# Patient Record
Sex: FEMALE | Race: WHITE | NOT HISPANIC OR LATINO | Employment: OTHER | URBAN - NONMETROPOLITAN AREA
[De-identification: names, ages, dates, MRNs, and addresses within clinical notes are randomized per-mention and may not be internally consistent; named-entity substitution may affect disease eponyms.]

---

## 2018-01-25 ENCOUNTER — APPOINTMENT (OUTPATIENT)
Dept: ULTRASOUND IMAGING | Facility: HOSPITAL | Age: 78
End: 2018-01-25

## 2018-01-25 ENCOUNTER — APPOINTMENT (OUTPATIENT)
Dept: GENERAL RADIOLOGY | Facility: HOSPITAL | Age: 78
End: 2018-01-25

## 2018-01-25 ENCOUNTER — HOSPITAL ENCOUNTER (INPATIENT)
Facility: HOSPITAL | Age: 78
LOS: 7 days | Discharge: NURSING, REHAB OR LONG-TERM CARE | End: 2018-02-01
Attending: EMERGENCY MEDICINE | Admitting: FAMILY MEDICINE

## 2018-01-25 DIAGNOSIS — I50.9 ACUTE CONGESTIVE HEART FAILURE, UNSPECIFIED CONGESTIVE HEART FAILURE TYPE: Primary | ICD-10-CM

## 2018-01-25 DIAGNOSIS — Z74.09 IMPAIRED FUNCTIONAL MOBILITY, BALANCE, GAIT, AND ENDURANCE: ICD-10-CM

## 2018-01-25 LAB
ALBUMIN SERPL-MCNC: 3 G/DL (ref 3.5–5)
ALBUMIN/GLOB SERPL: 0.9 G/DL (ref 1.1–2.5)
ALP SERPL-CCNC: 126 U/L (ref 24–120)
ALT SERPL W P-5'-P-CCNC: 35 U/L (ref 0–54)
ANION GAP SERPL CALCULATED.3IONS-SCNC: 12 MMOL/L (ref 4–13)
ANISOCYTOSIS BLD QL: NORMAL
AST SERPL-CCNC: 36 U/L (ref 7–45)
BACTERIA UR QL AUTO: ABNORMAL /HPF
BASOPHILS # BLD AUTO: 0.07 10*3/MM3 (ref 0–0.2)
BASOPHILS # BLD MANUAL: 0.08 10*3/MM3 (ref 0–0.2)
BASOPHILS NFR BLD AUTO: 0.8 % (ref 0–2)
BASOPHILS NFR BLD AUTO: 1 % (ref 0–2)
BILIRUB SERPL-MCNC: 0.3 MG/DL (ref 0.1–1)
BILIRUB UR QL STRIP: NEGATIVE
BUN BLD-MCNC: 28 MG/DL (ref 5–21)
BUN/CREAT SERPL: 19.3 (ref 7–25)
BURR CELLS BLD QL SMEAR: NORMAL
CALCIUM SPEC-SCNC: 8.8 MG/DL (ref 8.4–10.4)
CHLORIDE SERPL-SCNC: 102 MMOL/L (ref 98–110)
CK SERPL-CCNC: 36 U/L (ref 0–203)
CLARITY UR: ABNORMAL
CO2 SERPL-SCNC: 25 MMOL/L (ref 24–31)
COLOR UR: ABNORMAL
CREAT BLD-MCNC: 1.45 MG/DL (ref 0.5–1.4)
D DIMER PPP FEU-MCNC: 2.19 MG/L (FEU) (ref 0–0.5)
DEPRECATED RDW RBC AUTO: 42.5 FL (ref 40–54)
DEPRECATED RDW RBC AUTO: 44.5 FL (ref 40–54)
EOSINOPHIL # BLD AUTO: 0.09 10*3/MM3 (ref 0–0.7)
EOSINOPHIL NFR BLD AUTO: 1.1 % (ref 0–4)
ERYTHROCYTE [DISTWIDTH] IN BLOOD BY AUTOMATED COUNT: 16.5 % (ref 12–15)
ERYTHROCYTE [DISTWIDTH] IN BLOOD BY AUTOMATED COUNT: 16.6 % (ref 12–15)
GFR SERPL CREATININE-BSD FRML MDRD: 35 ML/MIN/1.73
GLOBULIN UR ELPH-MCNC: 3.4 GM/DL
GLUCOSE BLD-MCNC: 93 MG/DL (ref 70–100)
GLUCOSE UR STRIP-MCNC: NEGATIVE MG/DL
GRAN CASTS URNS QL MICRO: ABNORMAL /LPF
HCT VFR BLD AUTO: 29 % (ref 37–47)
HCT VFR BLD AUTO: 31.2 % (ref 37–47)
HGB BLD-MCNC: 8.7 G/DL (ref 12–16)
HGB BLD-MCNC: 8.8 G/DL (ref 12–16)
HGB UR QL STRIP.AUTO: NEGATIVE
HYALINE CASTS UR QL AUTO: ABNORMAL /LPF
HYPOCHROMIA BLD QL: NORMAL
HYPOCHROMIA BLD QL: NORMAL
IMM GRANULOCYTES # BLD: 0.03 10*3/MM3 (ref 0–0.03)
IMM GRANULOCYTES NFR BLD: 0.4 % (ref 0–5)
KETONES UR QL STRIP: NEGATIVE
LEUKOCYTE ESTERASE UR QL STRIP.AUTO: ABNORMAL
LYMPHOCYTES # BLD AUTO: 2.28 10*3/MM3 (ref 0.72–4.86)
LYMPHOCYTES # BLD MANUAL: 1.36 10*3/MM3 (ref 0.72–4.86)
LYMPHOCYTES NFR BLD AUTO: 26.7 % (ref 15–45)
LYMPHOCYTES NFR BLD MANUAL: 18 % (ref 15–45)
LYMPHOCYTES NFR BLD MANUAL: 9 % (ref 4–12)
MACROCYTES BLD QL SMEAR: NORMAL
MAGNESIUM SERPL-MCNC: 2 MG/DL (ref 1.4–2.2)
MCH RBC QN AUTO: 21 PG (ref 28–32)
MCH RBC QN AUTO: 21.8 PG (ref 28–32)
MCHC RBC AUTO-ENTMCNC: 28.2 G/DL (ref 33–36)
MCHC RBC AUTO-ENTMCNC: 30 G/DL (ref 33–36)
MCV RBC AUTO: 72.7 FL (ref 82–98)
MCV RBC AUTO: 74.5 FL (ref 82–98)
MICROCYTES BLD QL: NORMAL
MICROCYTES BLD QL: NORMAL
MONOCYTES # BLD AUTO: 0.68 10*3/MM3 (ref 0.19–1.3)
MONOCYTES # BLD AUTO: 0.7 10*3/MM3 (ref 0.19–1.3)
MONOCYTES NFR BLD AUTO: 8.2 % (ref 4–12)
NEUTROPHILS # BLD AUTO: 5.38 10*3/MM3 (ref 1.87–8.4)
NEUTROPHILS # BLD AUTO: 5.43 10*3/MM3 (ref 1.87–8.4)
NEUTROPHILS NFR BLD AUTO: 62.8 % (ref 39–78)
NEUTROPHILS NFR BLD MANUAL: 72 % (ref 39–78)
NITRITE UR QL STRIP: NEGATIVE
NRBC BLD MANUAL-RTO: 0 /100 WBC (ref 0–0)
NT-PROBNP SERPL-MCNC: 4200 PG/ML (ref 0–1800)
PH UR STRIP.AUTO: <=5 [PH] (ref 5–8)
PHOSPHATE SERPL-MCNC: 4.3 MG/DL (ref 2.5–4.5)
PLAT MORPH BLD: NORMAL
PLATELET # BLD AUTO: 371 10*3/MM3 (ref 130–400)
PLATELET # BLD AUTO: 377 10*3/MM3 (ref 130–400)
PMV BLD AUTO: 10.6 FL (ref 6–12)
PMV BLD AUTO: 11.2 FL (ref 6–12)
POIKILOCYTOSIS BLD QL SMEAR: NORMAL
POIKILOCYTOSIS BLD QL SMEAR: NORMAL
POLYCHROMASIA BLD QL SMEAR: NORMAL
POTASSIUM BLD-SCNC: 4.4 MMOL/L (ref 3.5–5.3)
PROT SERPL-MCNC: 6.4 G/DL (ref 6.3–8.7)
PROT UR QL STRIP: ABNORMAL
RBC # BLD AUTO: 3.99 10*6/MM3 (ref 4.2–5.4)
RBC # BLD AUTO: 4.19 10*6/MM3 (ref 4.2–5.4)
RBC # UR: ABNORMAL /HPF
REF LAB TEST METHOD: ABNORMAL
SCAN SLIDE: NORMAL
SMALL PLATELETS BLD QL SMEAR: ADEQUATE
SODIUM BLD-SCNC: 139 MMOL/L (ref 135–145)
SP GR UR STRIP: 1.03 (ref 1–1.03)
SQUAMOUS #/AREA URNS HPF: ABNORMAL /HPF
TARGETS BLD QL SMEAR: NORMAL
TROPONIN I SERPL-MCNC: 0.05 NG/ML (ref 0–0.03)
TROPONIN I SERPL-MCNC: 0.06 NG/ML (ref 0–0.03)
UROBILINOGEN UR QL STRIP: ABNORMAL
WBC MORPH BLD: NORMAL
WBC MORPH BLD: NORMAL
WBC NRBC COR # BLD: 7.54 10*3/MM3 (ref 4.8–10.8)
WBC NRBC COR # BLD: 8.55 10*3/MM3 (ref 4.8–10.8)
WBC UR QL AUTO: ABNORMAL /HPF
YEAST URNS QL MICRO: ABNORMAL /HPF

## 2018-01-25 PROCEDURE — 93970 EXTREMITY STUDY: CPT | Performed by: SURGERY

## 2018-01-25 PROCEDURE — 81001 URINALYSIS AUTO W/SCOPE: CPT | Performed by: EMERGENCY MEDICINE

## 2018-01-25 PROCEDURE — 85025 COMPLETE CBC W/AUTO DIFF WBC: CPT | Performed by: EMERGENCY MEDICINE

## 2018-01-25 PROCEDURE — 83735 ASSAY OF MAGNESIUM: CPT | Performed by: NURSE PRACTITIONER

## 2018-01-25 PROCEDURE — 93005 ELECTROCARDIOGRAM TRACING: CPT | Performed by: EMERGENCY MEDICINE

## 2018-01-25 PROCEDURE — 85379 FIBRIN DEGRADATION QUANT: CPT | Performed by: EMERGENCY MEDICINE

## 2018-01-25 PROCEDURE — 84484 ASSAY OF TROPONIN QUANT: CPT | Performed by: EMERGENCY MEDICINE

## 2018-01-25 PROCEDURE — 36415 COLL VENOUS BLD VENIPUNCTURE: CPT | Performed by: NURSE PRACTITIONER

## 2018-01-25 PROCEDURE — 25010000002 ENOXAPARIN PER 10 MG: Performed by: NURSE PRACTITIONER

## 2018-01-25 PROCEDURE — 85025 COMPLETE CBC W/AUTO DIFF WBC: CPT | Performed by: NURSE PRACTITIONER

## 2018-01-25 PROCEDURE — 85007 BL SMEAR W/DIFF WBC COUNT: CPT | Performed by: EMERGENCY MEDICINE

## 2018-01-25 PROCEDURE — 25010000002 FUROSEMIDE PER 20 MG: Performed by: EMERGENCY MEDICINE

## 2018-01-25 PROCEDURE — 87086 URINE CULTURE/COLONY COUNT: CPT | Performed by: EMERGENCY MEDICINE

## 2018-01-25 PROCEDURE — 85007 BL SMEAR W/DIFF WBC COUNT: CPT | Performed by: NURSE PRACTITIONER

## 2018-01-25 PROCEDURE — 93010 ELECTROCARDIOGRAM REPORT: CPT | Performed by: INTERNAL MEDICINE

## 2018-01-25 PROCEDURE — 71045 X-RAY EXAM CHEST 1 VIEW: CPT

## 2018-01-25 PROCEDURE — 83880 ASSAY OF NATRIURETIC PEPTIDE: CPT | Performed by: EMERGENCY MEDICINE

## 2018-01-25 PROCEDURE — 82550 ASSAY OF CK (CPK): CPT | Performed by: EMERGENCY MEDICINE

## 2018-01-25 PROCEDURE — 80053 COMPREHEN METABOLIC PANEL: CPT | Performed by: EMERGENCY MEDICINE

## 2018-01-25 PROCEDURE — 25010000002 CEFTRIAXONE PER 250 MG: Performed by: NURSE PRACTITIONER

## 2018-01-25 PROCEDURE — 93970 EXTREMITY STUDY: CPT

## 2018-01-25 PROCEDURE — 84100 ASSAY OF PHOSPHORUS: CPT | Performed by: NURSE PRACTITIONER

## 2018-01-25 PROCEDURE — 84484 ASSAY OF TROPONIN QUANT: CPT | Performed by: NURSE PRACTITIONER

## 2018-01-25 PROCEDURE — 99284 EMERGENCY DEPT VISIT MOD MDM: CPT

## 2018-01-25 RX ORDER — CARVEDILOL 3.12 MG/1
3.12 TABLET ORAL EVERY 12 HOURS SCHEDULED
Status: DISCONTINUED | OUTPATIENT
Start: 2018-01-25 | End: 2018-02-01 | Stop reason: HOSPADM

## 2018-01-25 RX ORDER — ONDANSETRON 2 MG/ML
4 INJECTION INTRAMUSCULAR; INTRAVENOUS EVERY 6 HOURS PRN
Status: DISCONTINUED | OUTPATIENT
Start: 2018-01-25 | End: 2018-02-01 | Stop reason: HOSPADM

## 2018-01-25 RX ORDER — LOPERAMIDE HYDROCHLORIDE 2 MG/1
2 CAPSULE ORAL 4 TIMES DAILY PRN
COMMUNITY
End: 2018-02-01 | Stop reason: HOSPADM

## 2018-01-25 RX ORDER — ACETAMINOPHEN 325 MG/1
650 TABLET ORAL EVERY 6 HOURS PRN
COMMUNITY

## 2018-01-25 RX ORDER — ONDANSETRON 4 MG/1
4 TABLET, ORALLY DISINTEGRATING ORAL EVERY 6 HOURS PRN
Status: DISCONTINUED | OUTPATIENT
Start: 2018-01-25 | End: 2018-02-01 | Stop reason: HOSPADM

## 2018-01-25 RX ORDER — SODIUM CHLORIDE 0.9 % (FLUSH) 0.9 %
1-10 SYRINGE (ML) INJECTION AS NEEDED
Status: DISCONTINUED | OUTPATIENT
Start: 2018-01-25 | End: 2018-02-01 | Stop reason: HOSPADM

## 2018-01-25 RX ORDER — ACETAMINOPHEN 325 MG/1
650 TABLET ORAL EVERY 4 HOURS PRN
Status: DISCONTINUED | OUTPATIENT
Start: 2018-01-25 | End: 2018-02-01 | Stop reason: HOSPADM

## 2018-01-25 RX ORDER — ONDANSETRON 4 MG/1
4 TABLET, FILM COATED ORAL EVERY 6 HOURS PRN
Status: DISCONTINUED | OUTPATIENT
Start: 2018-01-25 | End: 2018-02-01 | Stop reason: HOSPADM

## 2018-01-25 RX ORDER — FUROSEMIDE 10 MG/ML
40 INJECTION INTRAMUSCULAR; INTRAVENOUS ONCE
Status: COMPLETED | OUTPATIENT
Start: 2018-01-25 | End: 2018-01-25

## 2018-01-25 RX ORDER — SODIUM CHLORIDE 0.9 % (FLUSH) 0.9 %
10 SYRINGE (ML) INJECTION AS NEEDED
Status: DISCONTINUED | OUTPATIENT
Start: 2018-01-25 | End: 2018-02-01 | Stop reason: HOSPADM

## 2018-01-25 RX ADMIN — CARVEDILOL 3.12 MG: 3.12 TABLET, FILM COATED ORAL at 21:09

## 2018-01-25 RX ADMIN — ACETAMINOPHEN 650 MG: 325 TABLET ORAL at 18:54

## 2018-01-25 RX ADMIN — ACETAMINOPHEN 650 MG: 325 TABLET ORAL at 22:46

## 2018-01-25 RX ADMIN — CEFTRIAXONE SODIUM 1 G: 1 INJECTION, POWDER, FOR SOLUTION INTRAMUSCULAR; INTRAVENOUS at 18:50

## 2018-01-25 RX ADMIN — FUROSEMIDE 40 MG: 10 INJECTION, SOLUTION INTRAMUSCULAR; INTRAVENOUS at 16:23

## 2018-01-25 RX ADMIN — ACETAMINOPHEN 650 MG: 325 TABLET ORAL at 16:22

## 2018-01-25 RX ADMIN — ENOXAPARIN SODIUM 70 MG: 80 INJECTION SUBCUTANEOUS at 18:51

## 2018-01-26 ENCOUNTER — APPOINTMENT (OUTPATIENT)
Dept: CARDIOLOGY | Facility: HOSPITAL | Age: 78
End: 2018-01-26

## 2018-01-26 LAB
ADV 40+41 DNA STL QL NAA+NON-PROBE: NOT DETECTED
ALBUMIN SERPL-MCNC: 2.8 G/DL (ref 3.5–5)
ALBUMIN/GLOB SERPL: 0.9 G/DL (ref 1.1–2.5)
ALP SERPL-CCNC: 115 U/L (ref 24–120)
ALT SERPL W P-5'-P-CCNC: 31 U/L (ref 0–54)
ANION GAP SERPL CALCULATED.3IONS-SCNC: 11 MMOL/L (ref 4–13)
ARTICHOKE IGE QN: 71 MG/DL (ref 0–99)
AST SERPL-CCNC: 27 U/L (ref 7–45)
ASTRO TYP 1-8 RNA STL QL NAA+NON-PROBE: NOT DETECTED
BASOPHILS # BLD AUTO: 0.03 10*3/MM3 (ref 0–0.2)
BASOPHILS NFR BLD AUTO: 0.4 % (ref 0–2)
BH CV ECHO MEAS - AO MAX PG (FULL): 6 MMHG
BH CV ECHO MEAS - AO MAX PG: 8.8 MMHG
BH CV ECHO MEAS - AO MEAN PG (FULL): 4 MMHG
BH CV ECHO MEAS - AO MEAN PG: 5 MMHG
BH CV ECHO MEAS - AO ROOT AREA (BSA CORRECTED): 1.5
BH CV ECHO MEAS - AO ROOT AREA: 6.6 CM^2
BH CV ECHO MEAS - AO ROOT DIAM: 2.9 CM
BH CV ECHO MEAS - AO V2 MAX: 148 CM/SEC
BH CV ECHO MEAS - AO V2 MEAN: 102 CM/SEC
BH CV ECHO MEAS - AO V2 VTI: 31.8 CM
BH CV ECHO MEAS - AVA(I,A): 2.5 CM^2
BH CV ECHO MEAS - AVA(I,D): 2.5 CM^2
BH CV ECHO MEAS - AVA(V,A): 2.5 CM^2
BH CV ECHO MEAS - AVA(V,D): 2.5 CM^2
BH CV ECHO MEAS - BSA(HAYCOCK): 1.9 M^2
BH CV ECHO MEAS - BSA: 1.9 M^2
BH CV ECHO MEAS - BZI_BMI: 25.4 KILOGRAMS/M^2
BH CV ECHO MEAS - BZI_METRIC_HEIGHT: 172.7 CM
BH CV ECHO MEAS - BZI_METRIC_WEIGHT: 75.8 KG
BH CV ECHO MEAS - CONTRAST EF 4CH: 41.8 ML/M^2
BH CV ECHO MEAS - EDV(CUBED): 331.4 ML
BH CV ECHO MEAS - EDV(MOD-SP4): 169 ML
BH CV ECHO MEAS - EDV(TEICH): 248.9 ML
BH CV ECHO MEAS - EF(CUBED): 49.2 %
BH CV ECHO MEAS - EF(TEICH): 40.3 %
BH CV ECHO MEAS - ESV(CUBED): 168.2 ML
BH CV ECHO MEAS - ESV(MOD-SP4): 98.4 ML
BH CV ECHO MEAS - ESV(TEICH): 148.7 ML
BH CV ECHO MEAS - FS: 20.2 %
BH CV ECHO MEAS - IVS/LVPW: 1
BH CV ECHO MEAS - IVSD: 1.1 CM
BH CV ECHO MEAS - LA DIMENSION: 4.6 CM
BH CV ECHO MEAS - LA/AO: 1.6
BH CV ECHO MEAS - LAT PEAK E' VEL: 8 CM/SEC
BH CV ECHO MEAS - LV DIASTOLIC VOL/BSA (35-75): 89.3 ML/M^2
BH CV ECHO MEAS - LV MASS(C)D: 368.5 GRAMS
BH CV ECHO MEAS - LV MASS(C)DI: 194.7 GRAMS/M^2
BH CV ECHO MEAS - LV MAX PG: 2.8 MMHG
BH CV ECHO MEAS - LV MEAN PG: 1 MMHG
BH CV ECHO MEAS - LV SYSTOLIC VOL/BSA (12-30): 52 ML/M^2
BH CV ECHO MEAS - LV V1 MAX: 83.3 CM/SEC
BH CV ECHO MEAS - LV V1 MEAN: 50.5 CM/SEC
BH CV ECHO MEAS - LV V1 VTI: 17.7 CM
BH CV ECHO MEAS - LVIDD: 6.9 CM
BH CV ECHO MEAS - LVIDS: 5.5 CM
BH CV ECHO MEAS - LVLD AP4: 8.5 CM
BH CV ECHO MEAS - LVLS AP4: 7.9 CM
BH CV ECHO MEAS - LVOT AREA (M): 4.5 CM^2
BH CV ECHO MEAS - LVOT AREA: 4.5 CM^2
BH CV ECHO MEAS - LVOT DIAM: 2.4 CM
BH CV ECHO MEAS - LVPWD: 1.1 CM
BH CV ECHO MEAS - MED PEAK E' VEL: 3.02 CM/SEC
BH CV ECHO MEAS - MV A MAX VEL: 38.8 CM/SEC
BH CV ECHO MEAS - MV DEC TIME: 0.16 SEC
BH CV ECHO MEAS - MV E MAX VEL: 102 CM/SEC
BH CV ECHO MEAS - MV E/A: 2.6
BH CV ECHO MEAS - RAP SYSTOLE: 5 MMHG
BH CV ECHO MEAS - RVDD: 3.7 CM
BH CV ECHO MEAS - RVSP: 42 MMHG
BH CV ECHO MEAS - SI(AO): 111 ML/M^2
BH CV ECHO MEAS - SI(CUBED): 86.2 ML/M^2
BH CV ECHO MEAS - SI(LVOT): 42.3 ML/M^2
BH CV ECHO MEAS - SI(MOD-SP4): 37.3 ML/M^2
BH CV ECHO MEAS - SI(TEICH): 52.9 ML/M^2
BH CV ECHO MEAS - SV(AO): 210 ML
BH CV ECHO MEAS - SV(CUBED): 163.2 ML
BH CV ECHO MEAS - SV(LVOT): 80.1 ML
BH CV ECHO MEAS - SV(MOD-SP4): 70.6 ML
BH CV ECHO MEAS - SV(TEICH): 100.2 ML
BH CV ECHO MEAS - TR MAX VEL: 304 CM/SEC
BILIRUB SERPL-MCNC: 0.3 MG/DL (ref 0.1–1)
BUN BLD-MCNC: 27 MG/DL (ref 5–21)
BUN/CREAT SERPL: 18.4 (ref 7–25)
C CAYETANENSIS DNA STL QL NAA+NON-PROBE: NOT DETECTED
C DIFF TOX GENS STL QL NAA+PROBE: NOT DETECTED
CALCIUM SPEC-SCNC: 8.7 MG/DL (ref 8.4–10.4)
CAMPY SP DNA.DIARRHEA STL QL NAA+PROBE: NOT DETECTED
CHLORIDE SERPL-SCNC: 103 MMOL/L (ref 98–110)
CHOLEST SERPL-MCNC: 131 MG/DL (ref 130–200)
CO2 SERPL-SCNC: 24 MMOL/L (ref 24–31)
CREAT BLD-MCNC: 1.47 MG/DL (ref 0.5–1.4)
CRYPTOSP STL CULT: NOT DETECTED
DEPRECATED RDW RBC AUTO: 43.5 FL (ref 40–54)
E COLI DNA SPEC QL NAA+PROBE: NOT DETECTED
E HISTOLYT AG STL-ACNC: NOT DETECTED
E/E' RATIO: 33.8
EAEC PAA PLAS AGGR+AATA ST NAA+NON-PRB: NOT DETECTED
EC STX1+STX2 GENES STL QL NAA+NON-PROBE: NOT DETECTED
EOSINOPHIL # BLD AUTO: 0.06 10*3/MM3 (ref 0–0.7)
EOSINOPHIL NFR BLD AUTO: 0.9 % (ref 0–4)
EPEC EAE GENE STL QL NAA+NON-PROBE: NOT DETECTED
ERYTHROCYTE [DISTWIDTH] IN BLOOD BY AUTOMATED COUNT: 16.5 % (ref 12–15)
ETEC LTA+ST1A+ST1B TOX ST NAA+NON-PROBE: NOT DETECTED
FERRITIN SERPL-MCNC: 13.9 NG/ML (ref 11.1–264)
FOLATE SERPL-MCNC: 8.99 NG/ML
G LAMBLIA DNA SPEC QL NAA+PROBE: NOT DETECTED
GFR SERPL CREATININE-BSD FRML MDRD: 35 ML/MIN/1.73
GLOBULIN UR ELPH-MCNC: 3.1 GM/DL
GLUCOSE BLD-MCNC: 84 MG/DL (ref 70–100)
HBA1C MFR BLD: 6.7 %
HCT VFR BLD AUTO: 27.6 % (ref 37–47)
HDLC SERPL-MCNC: 43 MG/DL
HGB BLD-MCNC: 8.1 G/DL (ref 12–16)
IMM GRANULOCYTES # BLD: 0.02 10*3/MM3 (ref 0–0.03)
IMM GRANULOCYTES NFR BLD: 0.3 % (ref 0–5)
IRON 24H UR-MRATE: 25 MCG/DL (ref 42–180)
IRON SATN MFR SERPL: 6 % (ref 20–45)
LDLC/HDLC SERPL: 1.33 {RATIO}
LEFT ATRIUM VOLUME INDEX: 45.3 ML/M2
LEFT ATRIUM VOLUME: 85.6 CM3
LYMPHOCYTES # BLD AUTO: 1.44 10*3/MM3 (ref 0.72–4.86)
LYMPHOCYTES NFR BLD AUTO: 20.4 % (ref 15–45)
MAXIMAL PREDICTED HEART RATE: 144 BPM
MCH RBC QN AUTO: 21.5 PG (ref 28–32)
MCHC RBC AUTO-ENTMCNC: 29.3 G/DL (ref 33–36)
MCV RBC AUTO: 73.4 FL (ref 82–98)
MONOCYTES # BLD AUTO: 0.54 10*3/MM3 (ref 0.19–1.3)
MONOCYTES NFR BLD AUTO: 7.7 % (ref 4–12)
NEUTROPHILS # BLD AUTO: 4.96 10*3/MM3 (ref 1.87–8.4)
NEUTROPHILS NFR BLD AUTO: 70.3 % (ref 39–78)
NOROVIRUS GI+II RNA STL QL NAA+NON-PROBE: NOT DETECTED
NRBC BLD MANUAL-RTO: 0 /100 WBC (ref 0–0)
P SHIGELLOIDES DNA STL QL NAA+NON-PROBE: NOT DETECTED
PLATELET # BLD AUTO: 339 10*3/MM3 (ref 130–400)
PMV BLD AUTO: 10.7 FL (ref 6–12)
POTASSIUM BLD-SCNC: 4 MMOL/L (ref 3.5–5.3)
PROT SERPL-MCNC: 5.9 G/DL (ref 6.3–8.7)
RBC # BLD AUTO: 3.76 10*6/MM3 (ref 4.2–5.4)
RV RNA STL NAA+PROBE: NOT DETECTED
SALMONELLA DNA SPEC QL NAA+PROBE: NOT DETECTED
SAPO I+II+IV+V RNA STL QL NAA+NON-PROBE: NOT DETECTED
SHIGELLA SP+EIEC IPAH ST NAA+NON-PROBE: NOT DETECTED
SODIUM BLD-SCNC: 138 MMOL/L (ref 135–145)
STRESS TARGET HR: 122 BPM
TIBC SERPL-MCNC: 399 MCG/DL (ref 225–420)
TRIGL SERPL-MCNC: 154 MG/DL (ref 0–149)
TROPONIN I SERPL-MCNC: 0.06 NG/ML (ref 0–0.03)
TSH SERPL DL<=0.05 MIU/L-ACNC: 0.84 MIU/ML (ref 0.47–4.68)
V CHOLERAE DNA SPEC QL NAA+PROBE: NOT DETECTED
VIBRIO DNA SPEC NAA+PROBE: NOT DETECTED
VIT B12 BLD-MCNC: 456 PG/ML (ref 239–931)
WBC NRBC COR # BLD: 7.05 10*3/MM3 (ref 4.8–10.8)
YERSINIA STL CULT: NOT DETECTED

## 2018-01-26 PROCEDURE — 25010000002 PERFLUTREN 6.52 MG/ML SUSPENSION: Performed by: INTERNAL MEDICINE

## 2018-01-26 PROCEDURE — 85025 COMPLETE CBC W/AUTO DIFF WBC: CPT | Performed by: NURSE PRACTITIONER

## 2018-01-26 PROCEDURE — 25010000002 CEFTRIAXONE PER 250 MG: Performed by: NURSE PRACTITIONER

## 2018-01-26 PROCEDURE — 25010000002 IRON SUCROSE PER 1 MG: Performed by: INTERNAL MEDICINE

## 2018-01-26 PROCEDURE — 82728 ASSAY OF FERRITIN: CPT | Performed by: NURSE PRACTITIONER

## 2018-01-26 PROCEDURE — 82746 ASSAY OF FOLIC ACID SERUM: CPT | Performed by: NURSE PRACTITIONER

## 2018-01-26 PROCEDURE — 83550 IRON BINDING TEST: CPT | Performed by: NURSE PRACTITIONER

## 2018-01-26 PROCEDURE — 84443 ASSAY THYROID STIM HORMONE: CPT | Performed by: NURSE PRACTITIONER

## 2018-01-26 PROCEDURE — 87999 UNLISTED MICROBIOLOGY PX: CPT | Performed by: NURSE PRACTITIONER

## 2018-01-26 PROCEDURE — 25010000002 FUROSEMIDE PER 20 MG: Performed by: INTERNAL MEDICINE

## 2018-01-26 PROCEDURE — 80061 LIPID PANEL: CPT | Performed by: NURSE PRACTITIONER

## 2018-01-26 PROCEDURE — 83540 ASSAY OF IRON: CPT | Performed by: NURSE PRACTITIONER

## 2018-01-26 PROCEDURE — 83036 HEMOGLOBIN GLYCOSYLATED A1C: CPT | Performed by: NURSE PRACTITIONER

## 2018-01-26 PROCEDURE — 93306 TTE W/DOPPLER COMPLETE: CPT

## 2018-01-26 PROCEDURE — 25010000002 ENOXAPARIN PER 10 MG: Performed by: NURSE PRACTITIONER

## 2018-01-26 PROCEDURE — 93306 TTE W/DOPPLER COMPLETE: CPT | Performed by: INTERNAL MEDICINE

## 2018-01-26 PROCEDURE — 80053 COMPREHEN METABOLIC PANEL: CPT | Performed by: NURSE PRACTITIONER

## 2018-01-26 PROCEDURE — 82607 VITAMIN B-12: CPT | Performed by: NURSE PRACTITIONER

## 2018-01-26 RX ORDER — NYSTATIN 100000 [USP'U]/G
POWDER TOPICAL EVERY 8 HOURS SCHEDULED
Status: DISCONTINUED | OUTPATIENT
Start: 2018-01-26 | End: 2018-02-01 | Stop reason: HOSPADM

## 2018-01-26 RX ORDER — FUROSEMIDE 10 MG/ML
40 INJECTION INTRAMUSCULAR; INTRAVENOUS ONCE
Status: COMPLETED | OUTPATIENT
Start: 2018-01-26 | End: 2018-01-26

## 2018-01-26 RX ADMIN — CEFTRIAXONE SODIUM 1 G: 1 INJECTION, POWDER, FOR SOLUTION INTRAMUSCULAR; INTRAVENOUS at 17:39

## 2018-01-26 RX ADMIN — FUROSEMIDE 40 MG: 10 INJECTION, SOLUTION INTRAMUSCULAR; INTRAVENOUS at 17:34

## 2018-01-26 RX ADMIN — IRON SUCROSE 200 MG: 20 INJECTION, SOLUTION INTRAVENOUS at 17:45

## 2018-01-26 RX ADMIN — PERFLUTREN 8.48 MG: 6.52 INJECTION, SUSPENSION INTRAVENOUS at 15:47

## 2018-01-26 RX ADMIN — ENOXAPARIN SODIUM 70 MG: 80 INJECTION SUBCUTANEOUS at 17:36

## 2018-01-26 RX ADMIN — ACETAMINOPHEN 650 MG: 325 TABLET ORAL at 20:13

## 2018-01-26 RX ADMIN — ACETAMINOPHEN 650 MG: 325 TABLET ORAL at 15:03

## 2018-01-26 RX ADMIN — ENOXAPARIN SODIUM 70 MG: 80 INJECTION SUBCUTANEOUS at 06:23

## 2018-01-26 RX ADMIN — ACETAMINOPHEN 650 MG: 325 TABLET ORAL at 03:04

## 2018-01-26 RX ADMIN — NYSTATIN: 100000 POWDER TOPICAL at 20:17

## 2018-01-26 RX ADMIN — CARVEDILOL 3.12 MG: 3.12 TABLET, FILM COATED ORAL at 20:14

## 2018-01-26 RX ADMIN — SILVER SULFADIAZINE: 10 CREAM TOPICAL at 20:16

## 2018-01-26 RX ADMIN — SILVER SULFADIAZINE: 10 CREAM TOPICAL at 10:42

## 2018-01-26 RX ADMIN — CARVEDILOL 3.12 MG: 3.12 TABLET, FILM COATED ORAL at 08:49

## 2018-01-27 LAB
ANION GAP SERPL CALCULATED.3IONS-SCNC: 11 MMOL/L (ref 4–13)
BACTERIA SPEC AEROBE CULT: ABNORMAL
BACTERIA SPEC AEROBE CULT: ABNORMAL
BUN BLD-MCNC: 31 MG/DL (ref 5–21)
BUN/CREAT SERPL: 20.1 (ref 7–25)
CALCIUM SPEC-SCNC: 8.5 MG/DL (ref 8.4–10.4)
CHLORIDE SERPL-SCNC: 103 MMOL/L (ref 98–110)
CO2 SERPL-SCNC: 27 MMOL/L (ref 24–31)
CREAT BLD-MCNC: 1.54 MG/DL (ref 0.5–1.4)
DEPRECATED RDW RBC AUTO: 44.2 FL (ref 40–54)
ERYTHROCYTE [DISTWIDTH] IN BLOOD BY AUTOMATED COUNT: 16.8 % (ref 12–15)
GFR SERPL CREATININE-BSD FRML MDRD: 33 ML/MIN/1.73
GLUCOSE BLD-MCNC: 89 MG/DL (ref 70–100)
HCT VFR BLD AUTO: 31.4 % (ref 37–47)
HGB BLD-MCNC: 9.1 G/DL (ref 12–16)
MCH RBC QN AUTO: 21.5 PG (ref 28–32)
MCHC RBC AUTO-ENTMCNC: 29 G/DL (ref 33–36)
MCV RBC AUTO: 74.2 FL (ref 82–98)
PLATELET # BLD AUTO: 358 10*3/MM3 (ref 130–400)
PMV BLD AUTO: 10.7 FL (ref 6–12)
POTASSIUM BLD-SCNC: 3.9 MMOL/L (ref 3.5–5.3)
RBC # BLD AUTO: 4.23 10*6/MM3 (ref 4.2–5.4)
SODIUM BLD-SCNC: 141 MMOL/L (ref 135–145)
TROPONIN I SERPL-MCNC: 0.05 NG/ML (ref 0–0.03)
WBC NRBC COR # BLD: 8.65 10*3/MM3 (ref 4.8–10.8)

## 2018-01-27 PROCEDURE — 25010000002 CEFTRIAXONE PER 250 MG: Performed by: NURSE PRACTITIONER

## 2018-01-27 PROCEDURE — 99222 1ST HOSP IP/OBS MODERATE 55: CPT | Performed by: INTERNAL MEDICINE

## 2018-01-27 PROCEDURE — 85027 COMPLETE CBC AUTOMATED: CPT | Performed by: INTERNAL MEDICINE

## 2018-01-27 PROCEDURE — 25010000002 IRON SUCROSE PER 1 MG: Performed by: FAMILY MEDICINE

## 2018-01-27 PROCEDURE — 25010000002 FUROSEMIDE PER 20 MG: Performed by: FAMILY MEDICINE

## 2018-01-27 PROCEDURE — 80048 BASIC METABOLIC PNL TOTAL CA: CPT | Performed by: INTERNAL MEDICINE

## 2018-01-27 PROCEDURE — 25010000002 FUROSEMIDE PER 20 MG: Performed by: INTERNAL MEDICINE

## 2018-01-27 PROCEDURE — 84484 ASSAY OF TROPONIN QUANT: CPT | Performed by: INTERNAL MEDICINE

## 2018-01-27 PROCEDURE — 25010000002 ENOXAPARIN PER 10 MG: Performed by: NURSE PRACTITIONER

## 2018-01-27 RX ORDER — FUROSEMIDE 10 MG/ML
20 INJECTION INTRAMUSCULAR; INTRAVENOUS
Status: DISCONTINUED | OUTPATIENT
Start: 2018-01-27 | End: 2018-01-27

## 2018-01-27 RX ORDER — FUROSEMIDE 10 MG/ML
40 INJECTION INTRAMUSCULAR; INTRAVENOUS
Status: DISCONTINUED | OUTPATIENT
Start: 2018-01-27 | End: 2018-01-31

## 2018-01-27 RX ORDER — ISOSORBIDE DINITRATE 10 MG/1
10 TABLET ORAL EVERY 8 HOURS SCHEDULED
Status: DISCONTINUED | OUTPATIENT
Start: 2018-01-27 | End: 2018-01-30

## 2018-01-27 RX ORDER — HYDRALAZINE HYDROCHLORIDE 25 MG/1
25 TABLET, FILM COATED ORAL EVERY 8 HOURS SCHEDULED
Status: DISCONTINUED | OUTPATIENT
Start: 2018-01-27 | End: 2018-01-29

## 2018-01-27 RX ADMIN — NYSTATIN: 100000 POWDER TOPICAL at 23:19

## 2018-01-27 RX ADMIN — SILVER SULFADIAZINE: 10 CREAM TOPICAL at 08:46

## 2018-01-27 RX ADMIN — ISOSORBIDE DINITRATE 10 MG: 10 TABLET ORAL at 23:17

## 2018-01-27 RX ADMIN — ACETAMINOPHEN 650 MG: 325 TABLET ORAL at 23:44

## 2018-01-27 RX ADMIN — CARVEDILOL 3.12 MG: 3.12 TABLET, FILM COATED ORAL at 23:17

## 2018-01-27 RX ADMIN — ACETAMINOPHEN 650 MG: 325 TABLET ORAL at 05:57

## 2018-01-27 RX ADMIN — ACETAMINOPHEN 650 MG: 325 TABLET ORAL at 16:11

## 2018-01-27 RX ADMIN — ACETAMINOPHEN 650 MG: 325 TABLET ORAL at 01:03

## 2018-01-27 RX ADMIN — IRON SUCROSE 200 MG: 20 INJECTION, SOLUTION INTRAVENOUS at 16:11

## 2018-01-27 RX ADMIN — HYDRALAZINE HYDROCHLORIDE 25 MG: 25 TABLET ORAL at 18:42

## 2018-01-27 RX ADMIN — CEFTRIAXONE SODIUM 1 G: 1 INJECTION, POWDER, FOR SOLUTION INTRAMUSCULAR; INTRAVENOUS at 16:25

## 2018-01-27 RX ADMIN — CARVEDILOL 3.12 MG: 3.12 TABLET, FILM COATED ORAL at 08:46

## 2018-01-27 RX ADMIN — ENOXAPARIN SODIUM 70 MG: 80 INJECTION SUBCUTANEOUS at 05:57

## 2018-01-27 RX ADMIN — FUROSEMIDE 20 MG: 10 INJECTION, SOLUTION INTRAMUSCULAR; INTRAVENOUS at 08:46

## 2018-01-27 RX ADMIN — SILVER SULFADIAZINE: 10 CREAM TOPICAL at 23:19

## 2018-01-27 RX ADMIN — NYSTATIN: 100000 POWDER TOPICAL at 05:57

## 2018-01-27 RX ADMIN — FUROSEMIDE 40 MG: 10 INJECTION, SOLUTION INTRAMUSCULAR; INTRAVENOUS at 18:42

## 2018-01-28 LAB
ANION GAP SERPL CALCULATED.3IONS-SCNC: 8 MMOL/L (ref 4–13)
BUN BLD-MCNC: 29 MG/DL (ref 5–21)
BUN/CREAT SERPL: 21.6 (ref 7–25)
CALCIUM SPEC-SCNC: 8.5 MG/DL (ref 8.4–10.4)
CHLORIDE SERPL-SCNC: 100 MMOL/L (ref 98–110)
CO2 SERPL-SCNC: 32 MMOL/L (ref 24–31)
CREAT BLD-MCNC: 1.34 MG/DL (ref 0.5–1.4)
GFR SERPL CREATININE-BSD FRML MDRD: 38 ML/MIN/1.73
GLUCOSE BLD-MCNC: 98 MG/DL (ref 70–100)
POTASSIUM BLD-SCNC: 3.4 MMOL/L (ref 3.5–5.3)
SODIUM BLD-SCNC: 140 MMOL/L (ref 135–145)

## 2018-01-28 PROCEDURE — 25010000002 IRON SUCROSE PER 1 MG: Performed by: FAMILY MEDICINE

## 2018-01-28 PROCEDURE — 25010000002 FUROSEMIDE PER 20 MG: Performed by: INTERNAL MEDICINE

## 2018-01-28 PROCEDURE — 80048 BASIC METABOLIC PNL TOTAL CA: CPT | Performed by: FAMILY MEDICINE

## 2018-01-28 RX ORDER — POTASSIUM CHLORIDE 750 MG/1
40 CAPSULE, EXTENDED RELEASE ORAL DAILY
Status: DISCONTINUED | OUTPATIENT
Start: 2018-01-28 | End: 2018-02-01 | Stop reason: HOSPADM

## 2018-01-28 RX ORDER — POTASSIUM CHLORIDE 750 MG/1
10 CAPSULE, EXTENDED RELEASE ORAL 2 TIMES DAILY WITH MEALS
Status: DISCONTINUED | OUTPATIENT
Start: 2018-01-28 | End: 2018-01-28

## 2018-01-28 RX ADMIN — NYSTATIN: 100000 POWDER TOPICAL at 14:14

## 2018-01-28 RX ADMIN — POTASSIUM CHLORIDE 40 MEQ: 750 CAPSULE, EXTENDED RELEASE ORAL at 12:53

## 2018-01-28 RX ADMIN — FUROSEMIDE 40 MG: 10 INJECTION, SOLUTION INTRAMUSCULAR; INTRAVENOUS at 09:41

## 2018-01-28 RX ADMIN — CARVEDILOL 3.12 MG: 3.12 TABLET, FILM COATED ORAL at 21:56

## 2018-01-28 RX ADMIN — ISOSORBIDE DINITRATE 10 MG: 10 TABLET ORAL at 05:50

## 2018-01-28 RX ADMIN — ISOSORBIDE DINITRATE 10 MG: 10 TABLET ORAL at 21:57

## 2018-01-28 RX ADMIN — ACETAMINOPHEN 650 MG: 325 TABLET ORAL at 04:33

## 2018-01-28 RX ADMIN — HYDRALAZINE HYDROCHLORIDE 25 MG: 25 TABLET ORAL at 09:42

## 2018-01-28 RX ADMIN — HYDRALAZINE HYDROCHLORIDE 25 MG: 25 TABLET ORAL at 18:35

## 2018-01-28 RX ADMIN — IRON SUCROSE 200 MG: 20 INJECTION, SOLUTION INTRAVENOUS at 18:35

## 2018-01-28 RX ADMIN — SILVER SULFADIAZINE: 10 CREAM TOPICAL at 09:42

## 2018-01-28 RX ADMIN — SILVER SULFADIAZINE: 10 CREAM TOPICAL at 21:57

## 2018-01-28 RX ADMIN — NYSTATIN: 100000 POWDER TOPICAL at 05:50

## 2018-01-28 RX ADMIN — ACETAMINOPHEN 650 MG: 325 TABLET ORAL at 18:37

## 2018-01-28 RX ADMIN — NYSTATIN: 100000 POWDER TOPICAL at 21:57

## 2018-01-28 RX ADMIN — FUROSEMIDE 40 MG: 10 INJECTION, SOLUTION INTRAMUSCULAR; INTRAVENOUS at 18:35

## 2018-01-28 RX ADMIN — ISOSORBIDE DINITRATE 10 MG: 10 TABLET ORAL at 12:53

## 2018-01-28 RX ADMIN — CARVEDILOL 3.12 MG: 3.12 TABLET, FILM COATED ORAL at 09:42

## 2018-01-28 RX ADMIN — HYDRALAZINE HYDROCHLORIDE 25 MG: 25 TABLET ORAL at 01:59

## 2018-01-28 RX ADMIN — ACETAMINOPHEN 650 MG: 325 TABLET ORAL at 14:14

## 2018-01-29 LAB
ANION GAP SERPL CALCULATED.3IONS-SCNC: 9 MMOL/L (ref 4–13)
BASOPHILS # BLD AUTO: 0.04 10*3/MM3 (ref 0–0.2)
BASOPHILS NFR BLD AUTO: 0.5 % (ref 0–2)
BUN BLD-MCNC: 26 MG/DL (ref 5–21)
BUN/CREAT SERPL: 19.5 (ref 7–25)
CALCIUM SPEC-SCNC: 8.2 MG/DL (ref 8.4–10.4)
CHLORIDE SERPL-SCNC: 103 MMOL/L (ref 98–110)
CO2 SERPL-SCNC: 29 MMOL/L (ref 24–31)
CREAT BLD-MCNC: 1.33 MG/DL (ref 0.5–1.4)
DEPRECATED RDW RBC AUTO: 41.7 FL (ref 40–54)
EOSINOPHIL # BLD AUTO: 0.12 10*3/MM3 (ref 0–0.7)
EOSINOPHIL NFR BLD AUTO: 1.6 % (ref 0–4)
ERYTHROCYTE [DISTWIDTH] IN BLOOD BY AUTOMATED COUNT: 16.6 % (ref 12–15)
GFR SERPL CREATININE-BSD FRML MDRD: 39 ML/MIN/1.73
GLUCOSE BLD-MCNC: 85 MG/DL (ref 70–100)
HCT VFR BLD AUTO: 25.4 % (ref 37–47)
HCT VFR BLD AUTO: 28 % (ref 37–47)
HEMOCCULT STL QL: NEGATIVE
HEMOCCULT STL QL: NEGATIVE
HGB BLD-MCNC: 7.6 G/DL (ref 12–16)
HGB BLD-MCNC: 8.4 G/DL (ref 12–16)
IMM GRANULOCYTES # BLD: 0.02 10*3/MM3 (ref 0–0.03)
IMM GRANULOCYTES NFR BLD: 0.3 % (ref 0–5)
LYMPHOCYTES # BLD AUTO: 1.94 10*3/MM3 (ref 0.72–4.86)
LYMPHOCYTES NFR BLD AUTO: 25.5 % (ref 15–45)
MCH RBC QN AUTO: 21.7 PG (ref 28–32)
MCHC RBC AUTO-ENTMCNC: 29.9 G/DL (ref 33–36)
MCV RBC AUTO: 72.6 FL (ref 82–98)
MONOCYTES # BLD AUTO: 0.81 10*3/MM3 (ref 0.19–1.3)
MONOCYTES NFR BLD AUTO: 10.7 % (ref 4–12)
NEUTROPHILS # BLD AUTO: 4.67 10*3/MM3 (ref 1.87–8.4)
NEUTROPHILS NFR BLD AUTO: 61.4 % (ref 39–78)
NRBC BLD MANUAL-RTO: 0 /100 WBC (ref 0–0)
PLATELET # BLD AUTO: 343 10*3/MM3 (ref 130–400)
PMV BLD AUTO: 11.3 FL (ref 6–12)
POTASSIUM BLD-SCNC: 3.8 MMOL/L (ref 3.5–5.3)
RBC # BLD AUTO: 3.5 10*6/MM3 (ref 4.2–5.4)
SODIUM BLD-SCNC: 141 MMOL/L (ref 135–145)
WBC NRBC COR # BLD: 7.6 10*3/MM3 (ref 4.8–10.8)

## 2018-01-29 PROCEDURE — 99232 SBSQ HOSP IP/OBS MODERATE 35: CPT | Performed by: INTERNAL MEDICINE

## 2018-01-29 PROCEDURE — 85025 COMPLETE CBC W/AUTO DIFF WBC: CPT | Performed by: FAMILY MEDICINE

## 2018-01-29 PROCEDURE — 25010000002 FUROSEMIDE PER 20 MG: Performed by: INTERNAL MEDICINE

## 2018-01-29 PROCEDURE — 80048 BASIC METABOLIC PNL TOTAL CA: CPT | Performed by: FAMILY MEDICINE

## 2018-01-29 PROCEDURE — 82272 OCCULT BLD FECES 1-3 TESTS: CPT | Performed by: INTERNAL MEDICINE

## 2018-01-29 PROCEDURE — G8978 MOBILITY CURRENT STATUS: HCPCS

## 2018-01-29 PROCEDURE — 97162 PT EVAL MOD COMPLEX 30 MIN: CPT

## 2018-01-29 PROCEDURE — 85014 HEMATOCRIT: CPT | Performed by: INTERNAL MEDICINE

## 2018-01-29 PROCEDURE — G8979 MOBILITY GOAL STATUS: HCPCS

## 2018-01-29 PROCEDURE — 85018 HEMOGLOBIN: CPT | Performed by: INTERNAL MEDICINE

## 2018-01-29 RX ORDER — LIDOCAINE 50 MG/G
1 PATCH TOPICAL
Status: DISCONTINUED | OUTPATIENT
Start: 2018-01-29 | End: 2018-02-01 | Stop reason: HOSPADM

## 2018-01-29 RX ORDER — HYDRALAZINE HYDROCHLORIDE 25 MG/1
25 TABLET, FILM COATED ORAL EVERY 8 HOURS SCHEDULED
Status: DISCONTINUED | OUTPATIENT
Start: 2018-01-29 | End: 2018-01-30

## 2018-01-29 RX ADMIN — ACETAMINOPHEN 650 MG: 325 TABLET ORAL at 20:00

## 2018-01-29 RX ADMIN — FUROSEMIDE 40 MG: 10 INJECTION, SOLUTION INTRAMUSCULAR; INTRAVENOUS at 09:16

## 2018-01-29 RX ADMIN — SILVER SULFADIAZINE: 10 CREAM TOPICAL at 16:49

## 2018-01-29 RX ADMIN — CARVEDILOL 3.12 MG: 3.12 TABLET, FILM COATED ORAL at 09:16

## 2018-01-29 RX ADMIN — ACETAMINOPHEN 650 MG: 325 TABLET ORAL at 14:02

## 2018-01-29 RX ADMIN — ISOSORBIDE DINITRATE 10 MG: 10 TABLET ORAL at 05:17

## 2018-01-29 RX ADMIN — HYDRALAZINE HYDROCHLORIDE 25 MG: 25 TABLET ORAL at 01:35

## 2018-01-29 RX ADMIN — NYSTATIN: 100000 POWDER TOPICAL at 20:03

## 2018-01-29 RX ADMIN — CARVEDILOL 3.12 MG: 3.12 TABLET, FILM COATED ORAL at 20:00

## 2018-01-29 RX ADMIN — POTASSIUM CHLORIDE 40 MEQ: 750 CAPSULE, EXTENDED RELEASE ORAL at 09:16

## 2018-01-29 RX ADMIN — ISOSORBIDE DINITRATE 10 MG: 10 TABLET ORAL at 21:31

## 2018-01-29 RX ADMIN — NYSTATIN: 100000 POWDER TOPICAL at 05:17

## 2018-01-29 RX ADMIN — LIDOCAINE 1 PATCH: 50 PATCH CUTANEOUS at 20:00

## 2018-01-29 RX ADMIN — ACETAMINOPHEN 650 MG: 325 TABLET ORAL at 01:35

## 2018-01-29 RX ADMIN — HYDRALAZINE HYDROCHLORIDE 25 MG: 25 TABLET ORAL at 16:50

## 2018-01-29 RX ADMIN — NYSTATIN: 100000 POWDER TOPICAL at 11:58

## 2018-01-29 RX ADMIN — FUROSEMIDE 40 MG: 10 INJECTION, SOLUTION INTRAMUSCULAR; INTRAVENOUS at 16:49

## 2018-01-29 RX ADMIN — HYDRALAZINE HYDROCHLORIDE 25 MG: 25 TABLET ORAL at 09:16

## 2018-01-30 PROBLEM — J90 PLEURAL EFFUSION, BILATERAL: Status: ACTIVE | Noted: 2018-01-30

## 2018-01-30 PROBLEM — R00.0 TACHYCARDIA: Status: ACTIVE | Noted: 2018-01-30

## 2018-01-30 PROBLEM — R79.89 ELEVATED D-DIMER: Status: ACTIVE | Noted: 2018-01-30

## 2018-01-30 PROBLEM — I10 ESSENTIAL HYPERTENSION: Status: ACTIVE | Noted: 2018-01-30

## 2018-01-30 PROBLEM — N39.0 UTI (URINARY TRACT INFECTION): Status: ACTIVE | Noted: 2018-01-30

## 2018-01-30 PROBLEM — D50.9 IRON DEFICIENCY ANEMIA: Status: ACTIVE | Noted: 2018-01-30

## 2018-01-30 PROBLEM — I50.21 ACUTE SYSTOLIC CONGESTIVE HEART FAILURE (HCC): Status: ACTIVE | Noted: 2018-01-25

## 2018-01-30 PROBLEM — R77.8 ELEVATED TROPONIN LEVEL: Status: ACTIVE | Noted: 2018-01-30

## 2018-01-30 PROBLEM — E78.2 MIXED HYPERLIPIDEMIA: Status: ACTIVE | Noted: 2018-01-30

## 2018-01-30 LAB
ABO GROUP BLD: NORMAL
ANION GAP SERPL CALCULATED.3IONS-SCNC: 8 MMOL/L (ref 4–13)
ANISOCYTOSIS BLD QL: NORMAL
BASOPHILS # BLD AUTO: 0.03 10*3/MM3 (ref 0–0.2)
BASOPHILS NFR BLD AUTO: 0.4 % (ref 0–2)
BLD GP AB SCN SERPL QL: NEGATIVE
BUN BLD-MCNC: 25 MG/DL (ref 5–21)
BUN/CREAT SERPL: 20.7 (ref 7–25)
CALCIUM SPEC-SCNC: 8.6 MG/DL (ref 8.4–10.4)
CHLORIDE SERPL-SCNC: 101 MMOL/L (ref 98–110)
CO2 SERPL-SCNC: 31 MMOL/L (ref 24–31)
CREAT BLD-MCNC: 1.21 MG/DL (ref 0.5–1.4)
DEPRECATED RDW RBC AUTO: 42.3 FL (ref 40–54)
EOSINOPHIL # BLD AUTO: 0.09 10*3/MM3 (ref 0–0.7)
EOSINOPHIL NFR BLD AUTO: 1.2 % (ref 0–4)
ERYTHROCYTE [DISTWIDTH] IN BLOOD BY AUTOMATED COUNT: 17 % (ref 12–15)
GFR SERPL CREATININE-BSD FRML MDRD: 43 ML/MIN/1.73
GLUCOSE BLD-MCNC: 94 MG/DL (ref 70–100)
HCT VFR BLD AUTO: 24.4 % (ref 37–47)
HGB BLD-MCNC: 7.4 G/DL (ref 12–16)
HYPOCHROMIA BLD QL: NORMAL
IMM GRANULOCYTES # BLD: 0.03 10*3/MM3 (ref 0–0.03)
IMM GRANULOCYTES NFR BLD: 0.4 % (ref 0–5)
LYMPHOCYTES # BLD AUTO: 1.93 10*3/MM3 (ref 0.72–4.86)
LYMPHOCYTES NFR BLD AUTO: 25.3 % (ref 15–45)
MCH RBC QN AUTO: 22 PG (ref 28–32)
MCHC RBC AUTO-ENTMCNC: 30.3 G/DL (ref 33–36)
MCV RBC AUTO: 72.6 FL (ref 82–98)
MICROCYTES BLD QL: NORMAL
MONOCYTES # BLD AUTO: 0.8 10*3/MM3 (ref 0.19–1.3)
MONOCYTES NFR BLD AUTO: 10.5 % (ref 4–12)
NEUTROPHILS # BLD AUTO: 4.76 10*3/MM3 (ref 1.87–8.4)
NEUTROPHILS NFR BLD AUTO: 62.2 % (ref 39–78)
NRBC BLD MANUAL-RTO: 0 /100 WBC (ref 0–0)
PLAT MORPH BLD: NORMAL
PLATELET # BLD AUTO: 345 10*3/MM3 (ref 130–400)
PMV BLD AUTO: 10.8 FL (ref 6–12)
POIKILOCYTOSIS BLD QL SMEAR: NORMAL
POTASSIUM BLD-SCNC: 3.9 MMOL/L (ref 3.5–5.3)
RBC # BLD AUTO: 3.36 10*6/MM3 (ref 4.2–5.4)
RH BLD: POSITIVE
SODIUM BLD-SCNC: 140 MMOL/L (ref 135–145)
WBC MORPH BLD: NORMAL
WBC NRBC COR # BLD: 7.64 10*3/MM3 (ref 4.8–10.8)

## 2018-01-30 PROCEDURE — 86900 BLOOD TYPING SEROLOGIC ABO: CPT | Performed by: INTERNAL MEDICINE

## 2018-01-30 PROCEDURE — 36430 TRANSFUSION BLD/BLD COMPNT: CPT

## 2018-01-30 PROCEDURE — 80048 BASIC METABOLIC PNL TOTAL CA: CPT | Performed by: INTERNAL MEDICINE

## 2018-01-30 PROCEDURE — 85007 BL SMEAR W/DIFF WBC COUNT: CPT | Performed by: INTERNAL MEDICINE

## 2018-01-30 PROCEDURE — 86900 BLOOD TYPING SEROLOGIC ABO: CPT

## 2018-01-30 PROCEDURE — 25010000002 FUROSEMIDE PER 20 MG: Performed by: INTERNAL MEDICINE

## 2018-01-30 PROCEDURE — P9016 RBC LEUKOCYTES REDUCED: HCPCS

## 2018-01-30 PROCEDURE — 86850 RBC ANTIBODY SCREEN: CPT | Performed by: INTERNAL MEDICINE

## 2018-01-30 PROCEDURE — 85025 COMPLETE CBC W/AUTO DIFF WBC: CPT | Performed by: INTERNAL MEDICINE

## 2018-01-30 PROCEDURE — 86901 BLOOD TYPING SEROLOGIC RH(D): CPT | Performed by: INTERNAL MEDICINE

## 2018-01-30 PROCEDURE — 97110 THERAPEUTIC EXERCISES: CPT

## 2018-01-30 PROCEDURE — 86920 COMPATIBILITY TEST SPIN: CPT

## 2018-01-30 RX ORDER — HYDRALAZINE HYDROCHLORIDE 50 MG/1
50 TABLET, FILM COATED ORAL EVERY 8 HOURS SCHEDULED
Status: DISCONTINUED | OUTPATIENT
Start: 2018-01-30 | End: 2018-02-01 | Stop reason: HOSPADM

## 2018-01-30 RX ORDER — GABAPENTIN 100 MG/1
100 CAPSULE ORAL NIGHTLY
Status: DISCONTINUED | OUTPATIENT
Start: 2018-01-30 | End: 2018-02-01 | Stop reason: HOSPADM

## 2018-01-30 RX ORDER — ISOSORBIDE DINITRATE 20 MG/1
20 TABLET ORAL EVERY 8 HOURS SCHEDULED
Status: DISCONTINUED | OUTPATIENT
Start: 2018-01-30 | End: 2018-02-01 | Stop reason: HOSPADM

## 2018-01-30 RX ADMIN — NYSTATIN: 100000 POWDER TOPICAL at 06:44

## 2018-01-30 RX ADMIN — FUROSEMIDE 40 MG: 10 INJECTION, SOLUTION INTRAMUSCULAR; INTRAVENOUS at 09:36

## 2018-01-30 RX ADMIN — Medication 10 ML: at 21:18

## 2018-01-30 RX ADMIN — LIDOCAINE 1 PATCH: 50 PATCH CUTANEOUS at 16:08

## 2018-01-30 RX ADMIN — ACETAMINOPHEN 650 MG: 325 TABLET ORAL at 16:08

## 2018-01-30 RX ADMIN — HYDRALAZINE HYDROCHLORIDE 50 MG: 50 TABLET ORAL at 12:31

## 2018-01-30 RX ADMIN — ISOSORBIDE DINITRATE 20 MG: 20 TABLET ORAL at 21:16

## 2018-01-30 RX ADMIN — NYSTATIN: 100000 POWDER TOPICAL at 21:17

## 2018-01-30 RX ADMIN — NYSTATIN: 100000 POWDER TOPICAL at 12:33

## 2018-01-30 RX ADMIN — HYDRALAZINE HYDROCHLORIDE 50 MG: 50 TABLET ORAL at 21:16

## 2018-01-30 RX ADMIN — CARVEDILOL 3.12 MG: 3.12 TABLET, FILM COATED ORAL at 21:16

## 2018-01-30 RX ADMIN — ISOSORBIDE DINITRATE 10 MG: 10 TABLET ORAL at 06:46

## 2018-01-30 RX ADMIN — GABAPENTIN 100 MG: 100 CAPSULE ORAL at 21:16

## 2018-01-30 RX ADMIN — HYDRALAZINE HYDROCHLORIDE 25 MG: 25 TABLET, FILM COATED ORAL at 06:46

## 2018-01-30 RX ADMIN — ACETAMINOPHEN 650 MG: 325 TABLET ORAL at 01:34

## 2018-01-30 RX ADMIN — SILVER SULFADIAZINE: 10 CREAM TOPICAL at 06:47

## 2018-01-30 RX ADMIN — POTASSIUM CHLORIDE 40 MEQ: 750 CAPSULE, EXTENDED RELEASE ORAL at 09:36

## 2018-01-30 RX ADMIN — ISOSORBIDE DINITRATE 20 MG: 20 TABLET ORAL at 12:31

## 2018-01-30 RX ADMIN — SILVER SULFADIAZINE: 10 CREAM TOPICAL at 17:11

## 2018-01-30 RX ADMIN — FUROSEMIDE 40 MG: 10 INJECTION, SOLUTION INTRAMUSCULAR; INTRAVENOUS at 16:08

## 2018-01-31 LAB
ANION GAP SERPL CALCULATED.3IONS-SCNC: 5 MMOL/L (ref 4–13)
BASOPHILS # BLD AUTO: 0.06 10*3/MM3 (ref 0–0.2)
BASOPHILS NFR BLD AUTO: 0.8 % (ref 0–2)
BUN BLD-MCNC: 24 MG/DL (ref 5–21)
BUN/CREAT SERPL: 20.5 (ref 7–25)
CALCIUM SPEC-SCNC: 8.4 MG/DL (ref 8.4–10.4)
CHLORIDE SERPL-SCNC: 101 MMOL/L (ref 98–110)
CO2 SERPL-SCNC: 33 MMOL/L (ref 24–31)
CREAT BLD-MCNC: 1.17 MG/DL (ref 0.5–1.4)
DEPRECATED RDW RBC AUTO: 44.3 FL (ref 40–54)
EOSINOPHIL # BLD AUTO: 0.07 10*3/MM3 (ref 0–0.7)
EOSINOPHIL NFR BLD AUTO: 1 % (ref 0–4)
ERYTHROCYTE [DISTWIDTH] IN BLOOD BY AUTOMATED COUNT: 18.9 % (ref 12–15)
GFR SERPL CREATININE-BSD FRML MDRD: 45 ML/MIN/1.73
GLUCOSE BLD-MCNC: 95 MG/DL (ref 70–100)
HCT VFR BLD AUTO: 27.1 % (ref 37–47)
HGB BLD-MCNC: 8.3 G/DL (ref 12–16)
HYPOCHROMIA BLD QL: NORMAL
IMM GRANULOCYTES # BLD: 0.03 10*3/MM3 (ref 0–0.03)
IMM GRANULOCYTES NFR BLD: 0.4 % (ref 0–5)
LYMPHOCYTES # BLD AUTO: 1.73 10*3/MM3 (ref 0.72–4.86)
LYMPHOCYTES NFR BLD AUTO: 24.4 % (ref 15–45)
MCH RBC QN AUTO: 22.4 PG (ref 28–32)
MCHC RBC AUTO-ENTMCNC: 30.6 G/DL (ref 33–36)
MCV RBC AUTO: 73 FL (ref 82–98)
MONOCYTES # BLD AUTO: 0.82 10*3/MM3 (ref 0.19–1.3)
MONOCYTES NFR BLD AUTO: 11.6 % (ref 4–12)
NEUTROPHILS # BLD AUTO: 4.37 10*3/MM3 (ref 1.87–8.4)
NEUTROPHILS NFR BLD AUTO: 61.8 % (ref 39–78)
NRBC BLD MANUAL-RTO: 0 /100 WBC (ref 0–0)
PLAT MORPH BLD: NORMAL
PLATELET # BLD AUTO: 351 10*3/MM3 (ref 130–400)
PMV BLD AUTO: 10.5 FL (ref 6–12)
POIKILOCYTOSIS BLD QL SMEAR: NORMAL
POTASSIUM BLD-SCNC: 4.4 MMOL/L (ref 3.5–5.3)
RBC # BLD AUTO: 3.71 10*6/MM3 (ref 4.2–5.4)
SODIUM BLD-SCNC: 139 MMOL/L (ref 135–145)
STOMATOCYTES BLD QL SMEAR: NORMAL
TARGETS BLD QL SMEAR: NORMAL
TROPONIN I SERPL-MCNC: 0.06 NG/ML (ref 0–0.03)
WBC MORPH BLD: NORMAL
WBC NRBC COR # BLD: 7.08 10*3/MM3 (ref 4.8–10.8)

## 2018-01-31 PROCEDURE — 93005 ELECTROCARDIOGRAM TRACING: CPT | Performed by: PHYSICIAN ASSISTANT

## 2018-01-31 PROCEDURE — 85007 BL SMEAR W/DIFF WBC COUNT: CPT | Performed by: INTERNAL MEDICINE

## 2018-01-31 PROCEDURE — 97110 THERAPEUTIC EXERCISES: CPT

## 2018-01-31 PROCEDURE — 80048 BASIC METABOLIC PNL TOTAL CA: CPT | Performed by: NURSE PRACTITIONER

## 2018-01-31 PROCEDURE — 85025 COMPLETE CBC W/AUTO DIFF WBC: CPT | Performed by: INTERNAL MEDICINE

## 2018-01-31 PROCEDURE — 93010 ELECTROCARDIOGRAM REPORT: CPT | Performed by: INTERNAL MEDICINE

## 2018-01-31 PROCEDURE — 25010000002 FUROSEMIDE PER 20 MG: Performed by: INTERNAL MEDICINE

## 2018-01-31 PROCEDURE — 97116 GAIT TRAINING THERAPY: CPT

## 2018-01-31 PROCEDURE — 84484 ASSAY OF TROPONIN QUANT: CPT | Performed by: PHYSICIAN ASSISTANT

## 2018-01-31 RX ORDER — FUROSEMIDE 20 MG/1
20 TABLET ORAL
Status: DISCONTINUED | OUTPATIENT
Start: 2018-01-31 | End: 2018-02-01

## 2018-01-31 RX ADMIN — CARVEDILOL 3.12 MG: 3.12 TABLET, FILM COATED ORAL at 20:04

## 2018-01-31 RX ADMIN — SILVER SULFADIAZINE: 10 CREAM TOPICAL at 20:04

## 2018-01-31 RX ADMIN — ACETAMINOPHEN 650 MG: 325 TABLET ORAL at 01:55

## 2018-01-31 RX ADMIN — FUROSEMIDE 40 MG: 10 INJECTION, SOLUTION INTRAMUSCULAR; INTRAVENOUS at 09:36

## 2018-01-31 RX ADMIN — POTASSIUM CHLORIDE 40 MEQ: 750 CAPSULE, EXTENDED RELEASE ORAL at 09:36

## 2018-01-31 RX ADMIN — HYDRALAZINE HYDROCHLORIDE 50 MG: 50 TABLET ORAL at 05:22

## 2018-01-31 RX ADMIN — ISOSORBIDE DINITRATE 20 MG: 20 TABLET ORAL at 13:48

## 2018-01-31 RX ADMIN — FUROSEMIDE 20 MG: 20 TABLET ORAL at 17:35

## 2018-01-31 RX ADMIN — ACETAMINOPHEN 650 MG: 325 TABLET ORAL at 20:04

## 2018-01-31 RX ADMIN — ISOSORBIDE DINITRATE 20 MG: 20 TABLET ORAL at 05:22

## 2018-01-31 RX ADMIN — ACETAMINOPHEN 650 MG: 325 TABLET ORAL at 15:51

## 2018-01-31 RX ADMIN — NYSTATIN: 100000 POWDER TOPICAL at 21:23

## 2018-01-31 RX ADMIN — NYSTATIN: 100000 POWDER TOPICAL at 13:48

## 2018-01-31 RX ADMIN — GABAPENTIN 100 MG: 100 CAPSULE ORAL at 20:04

## 2018-01-31 RX ADMIN — ISOSORBIDE DINITRATE 20 MG: 20 TABLET ORAL at 21:23

## 2018-01-31 RX ADMIN — SILVER SULFADIAZINE: 10 CREAM TOPICAL at 05:22

## 2018-01-31 RX ADMIN — NYSTATIN: 100000 POWDER TOPICAL at 05:22

## 2018-01-31 RX ADMIN — HYDRALAZINE HYDROCHLORIDE 50 MG: 50 TABLET ORAL at 21:23

## 2018-01-31 RX ADMIN — Medication 10 ML: at 01:49

## 2018-01-31 RX ADMIN — ACETAMINOPHEN 650 MG: 325 TABLET ORAL at 09:37

## 2018-01-31 RX ADMIN — CARVEDILOL 3.12 MG: 3.12 TABLET, FILM COATED ORAL at 09:36

## 2018-01-31 RX ADMIN — LIDOCAINE 1 PATCH: 50 PATCH CUTANEOUS at 17:35

## 2018-01-31 RX ADMIN — HYDRALAZINE HYDROCHLORIDE 50 MG: 50 TABLET ORAL at 13:48

## 2018-02-01 ENCOUNTER — APPOINTMENT (OUTPATIENT)
Dept: GENERAL RADIOLOGY | Facility: HOSPITAL | Age: 78
End: 2018-02-01

## 2018-02-01 VITALS
HEART RATE: 97 BPM | OXYGEN SATURATION: 97 % | SYSTOLIC BLOOD PRESSURE: 121 MMHG | BODY MASS INDEX: 23.82 KG/M2 | WEIGHT: 157.19 LBS | TEMPERATURE: 97.3 F | RESPIRATION RATE: 22 BRPM | DIASTOLIC BLOOD PRESSURE: 62 MMHG | HEIGHT: 68 IN

## 2018-02-01 LAB
ABO + RH BLD: NORMAL
BH BB BLOOD EXPIRATION DATE: NORMAL
BH BB BLOOD TYPE BARCODE: 5100
BH BB DISPENSE STATUS: NORMAL
BH BB PRODUCT CODE: NORMAL
BH BB UNIT NUMBER: NORMAL
CROSSMATCH INTERPRETATION: NORMAL
UNIT  ABO: NORMAL
UNIT  RH: NORMAL

## 2018-02-01 PROCEDURE — 71046 X-RAY EXAM CHEST 2 VIEWS: CPT

## 2018-02-01 PROCEDURE — 97116 GAIT TRAINING THERAPY: CPT

## 2018-02-01 PROCEDURE — 97110 THERAPEUTIC EXERCISES: CPT

## 2018-02-01 PROCEDURE — 99232 SBSQ HOSP IP/OBS MODERATE 35: CPT | Performed by: INTERNAL MEDICINE

## 2018-02-01 RX ORDER — FUROSEMIDE 40 MG/1
40 TABLET ORAL
Status: DISCONTINUED | OUTPATIENT
Start: 2018-02-01 | End: 2018-02-01 | Stop reason: HOSPADM

## 2018-02-01 RX ORDER — FUROSEMIDE 40 MG/1
40 TABLET ORAL 2 TIMES DAILY
Status: ON HOLD
Start: 2018-02-01 | End: 2023-01-06

## 2018-02-01 RX ORDER — ISOSORBIDE DINITRATE 20 MG/1
20 TABLET ORAL EVERY 8 HOURS SCHEDULED
Start: 2018-02-01

## 2018-02-01 RX ORDER — POTASSIUM CHLORIDE 750 MG/1
40 CAPSULE, EXTENDED RELEASE ORAL DAILY
Start: 2018-02-02 | End: 2023-01-16 | Stop reason: HOSPADM

## 2018-02-01 RX ORDER — CARVEDILOL 3.12 MG/1
3.12 TABLET ORAL EVERY 12 HOURS SCHEDULED
Status: ON HOLD
Start: 2018-02-01 | End: 2023-01-06

## 2018-02-01 RX ORDER — FUROSEMIDE 20 MG/1
20 TABLET ORAL 2 TIMES DAILY
Start: 2018-02-01 | End: 2018-02-01 | Stop reason: HOSPADM

## 2018-02-01 RX ORDER — HYDRALAZINE HYDROCHLORIDE 50 MG/1
50 TABLET, FILM COATED ORAL EVERY 8 HOURS SCHEDULED
Start: 2018-02-01 | End: 2023-01-16 | Stop reason: HOSPADM

## 2018-02-01 RX ADMIN — FUROSEMIDE 40 MG: 40 TABLET ORAL at 18:12

## 2018-02-01 RX ADMIN — LIDOCAINE 1 PATCH: 50 PATCH CUTANEOUS at 18:12

## 2018-02-01 RX ADMIN — POTASSIUM CHLORIDE 40 MEQ: 750 CAPSULE, EXTENDED RELEASE ORAL at 08:44

## 2018-02-01 RX ADMIN — CARVEDILOL 3.12 MG: 3.12 TABLET, FILM COATED ORAL at 08:43

## 2018-02-01 RX ADMIN — FUROSEMIDE 20 MG: 20 TABLET ORAL at 08:44

## 2018-02-01 RX ADMIN — HYDRALAZINE HYDROCHLORIDE 50 MG: 50 TABLET ORAL at 05:27

## 2018-02-01 RX ADMIN — ISOSORBIDE DINITRATE 20 MG: 20 TABLET ORAL at 05:27

## 2018-02-01 RX ADMIN — NYSTATIN: 100000 POWDER TOPICAL at 05:27

## 2018-02-01 RX ADMIN — ISOSORBIDE DINITRATE 20 MG: 20 TABLET ORAL at 14:55

## 2018-02-01 RX ADMIN — NYSTATIN: 100000 POWDER TOPICAL at 14:55

## 2018-02-01 RX ADMIN — HYDRALAZINE HYDROCHLORIDE 50 MG: 50 TABLET ORAL at 14:55

## 2018-02-01 RX ADMIN — ACETAMINOPHEN 650 MG: 325 TABLET ORAL at 02:47

## 2018-02-01 RX ADMIN — SILVER SULFADIAZINE: 10 CREAM TOPICAL at 08:45

## 2018-02-01 NOTE — PLAN OF CARE
Problem: Patient Care Overview (Adult)  Goal: Plan of Care Review  Outcome: Ongoing (interventions implemented as appropriate)   02/01/18 1135   Coping/Psychosocial Response Interventions   Plan Of Care Reviewed With patient   Patient Care Overview   Progress progress towards functional goals is fair   Outcome Evaluation   Outcome Summary/Follow up Plan Pt. has good bed mobility. Transfers and ambulates 90' with RWX. Fatigues quickly. Works thru LE ex's with rests. Will benefit from strengthening and increase activity as tolerates.

## 2018-02-01 NOTE — PLAN OF CARE
"Problem: Patient Care Overview (Adult)  Goal: Plan of Care Review  Outcome: Ongoing (interventions implemented as appropriate)   02/01/18 0302   Coping/Psychosocial Response Interventions   Plan Of Care Reviewed With patient   Patient Care Overview   Progress progress toward functional goals as expected   Outcome Evaluation   Outcome Summary/Follow up Plan Medicated with tylenol x2 this shift for back and leg pain. Scheduled meds given to help with pain control. Ambulated to  with assist x1 and during the night using the walker for unsteady gait. Becomes angry easily, pleasant when getting \"her way\". Probable discharge to NH today after life vest. SA  pvc's       Problem: Cardiac: Heart Failure (Adult)  Goal: Signs and Symptoms of Listed Potential Problems Will be Absent or Manageable (Cardiac: Heart Failure)  Outcome: Ongoing (interventions implemented as appropriate)   02/01/18 0302   Cardiac: Heart Failure   Problems Assessed (Heart Failure) cardiac pump dysfunction;decreased quality of life;functional decline/self-care deficit   Problems Present (Heart Failure) cardiac pump dysfunction;decreased quality of life;functional decline/self-care deficit;situational response       Problem: Pain, Acute (Adult)  Goal: Acceptable Pain Control/Comfort Level  Outcome: Ongoing (interventions implemented as appropriate)   02/01/18 0302   Pain, Acute (Adult)   Acceptable Pain Control/Comfort Level making progress toward outcome         "

## 2018-02-01 NOTE — DISCHARGE SUMMARY
[facility]  DISCHARGE SUMMARY       Date of Admission: 1/25/2018  Date of Discharge:  2/1/2018  Primary Care Physician: No Known Provider    Presenting Problem/History of Present Illness:  CHF (congestive heart failure) [I50.9]     Final Discharge Diagnoses:  Hospital Problem List     Acute systolic congestive heart failure    Elevated troponin level    Iron deficiency anemia    Tachycardia    Elevated d-dimer    UTI (urinary tract infection)    Essential hypertension    Mixed hyperlipidemia    Pleural effusion, bilateral          Consults: cardiology    Procedures Performed:     Pertinent Test Results:   Imaging Results (last 72 hours)     Procedure Component Value Units Date/Time    XR Chest PA & Lateral [[id number]] Collected:  02/01/18 1050     Updated:  02/01/18 1058    Narrative:       EXAMINATION:   XR CHEST PA AND LATERAL-  2/1/2018 10:50 AM CST     HISTORY: Dyspnea     PA and lateral views of the chest obtained. Hyperinflation flattening  diaphragms noted consistent with COPD. This blunting of the left  costophrenic angle is noted. This is probably a small left pleural  effusion. Surgical plate and fixation screws are noted in the left  humerus. Cardiac silhouettes moderately enlarged.     IMPRESSION cardiomegaly.  2. COPD and chronic change in the pulmonary parenchyma.  This report was finalized on 02/01/2018 10:55 by Dr. Jeyson Ambrosio MD.        Lab Results (last 72 hours)     Procedure Component Value Units Date/Time    Occult Blood X 1, Stool - Stool, [[id number]]  (Normal) Collected:  01/29/18 1700    Specimen:  Stool Updated:  01/29/18 1730     Fecal Occult Blood Negative    Hemoglobin & Hematocrit, Blood [[id number]]  (Abnormal) Collected:  01/29/18 1733    Specimen:  Blood Updated:  01/29/18 1757     Hemoglobin 8.4 (L) g/dL      Hematocrit 28.0 (L) %     Occult Blood X 1, Stool - Stool, Per Rectum [[id number]]  (Normal) Collected:  01/29/18 2031     Specimen:  Stool from Per Rectum Updated:  01/29/18 2041     Fecal Occult Blood Negative    Basic Metabolic Panel [[id number]]  (Abnormal) Collected:  01/30/18 0437    Specimen:  Blood Updated:  01/30/18 0540     Glucose 94 mg/dL      BUN 25 (H) mg/dL      Creatinine 1.21 mg/dL      Sodium 140 mmol/L      Potassium 3.9 mmol/L      Chloride 101 mmol/L      CO2 31.0 mmol/L      Calcium 8.6 mg/dL      eGFR Non African Amer 43 (L) mL/min/1.73      BUN/Creatinine Ratio 20.7     Anion Gap 8.0 mmol/L     Narrative:       The MDRD GFR formula is only valid for adults with stable renal function between ages 18 and 70.    CBC & Differential [[id number]] Collected:  01/30/18 0437    Specimen:  Blood Updated:  01/30/18 0543    Narrative:       The following orders were created for panel order CBC & Differential.  Procedure                               Abnormality         Status                     ---------                               -----------         ------                     Scan Slide[[id number]]                                       Final result               CBC Auto Differential[[id number]]        Abnormal            Final result                 Please view results for these tests on the individual orders.    CBC Auto Differential [[id number]]  (Abnormal) Collected:  01/30/18 0437    Specimen:  Blood Updated:  01/30/18 0543     WBC 7.64 10*3/mm3      RBC 3.36 (L) 10*6/mm3      Hemoglobin 7.4 (L) g/dL      Hematocrit 24.4 (L) %      MCV 72.6 (L) fL      MCH 22.0 (L) pg      MCHC 30.3 (L) g/dL      RDW 17.0 (H) %      RDW-SD 42.3 fl      MPV 10.8 fL      Platelets 345 10*3/mm3      Neutrophil % 62.2 %      Lymphocyte % 25.3 %      Monocyte % 10.5 %      Eosinophil % 1.2 %      Basophil % 0.4 %      Immature Grans % 0.4 %      Neutrophils, Absolute 4.76 10*3/mm3      Lymphocytes, Absolute 1.93 10*3/mm3      Monocytes, Absolute 0.80 10*3/mm3      Eosinophils, Absolute 0.09 10*3/mm3      Basophils, Absolute 0.03 10*3/mm3       Immature Grans, Absolute 0.03 10*3/mm3      nRBC 0.0 /100 WBC     Scan Slide [[id number]] Collected:  01/30/18 0437    Specimen:  Blood Updated:  01/30/18 0543     Anisocytosis Slight/1+     Hypochromia Mod/2+     Microcytes Slight/1+     Poikilocytes Slight/1+     WBC Morphology Normal     Platelet Morphology Normal    Basic Metabolic Panel [[id number]]  (Abnormal) Collected:  01/31/18 0352    Specimen:  Blood Updated:  01/31/18 0444     Glucose 95 mg/dL      BUN 24 (H) mg/dL      Creatinine 1.17 mg/dL      Sodium 139 mmol/L      Potassium 4.4 mmol/L      Chloride 101 mmol/L      CO2 33.0 (H) mmol/L      Calcium 8.4 mg/dL      eGFR Non African Amer 45 (L) mL/min/1.73      BUN/Creatinine Ratio 20.5     Anion Gap 5.0 mmol/L     Narrative:       The MDRD GFR formula is only valid for adults with stable renal function between ages 18 and 70.    CBC & Differential [[id number]] Collected:  01/31/18 0352    Specimen:  Blood Updated:  01/31/18 0530    Narrative:       The following orders were created for panel order CBC & Differential.  Procedure                               Abnormality         Status                     ---------                               -----------         ------                     Scan Slide[[id number]]                                       Final result               CBC Auto Differential[[id number]]        Abnormal            Final result                 Please view results for these tests on the individual orders.    CBC Auto Differential [[id number]]  (Abnormal) Collected:  01/31/18 0352    Specimen:  Blood Updated:  01/31/18 0530     WBC 7.08 10*3/mm3      RBC 3.71 (L) 10*6/mm3      Hemoglobin 8.3 (L) g/dL      Hematocrit 27.1 (L) %      MCV 73.0 (L) fL      MCH 22.4 (L) pg      MCHC 30.6 (L) g/dL      RDW 18.9 (H) %      RDW-SD 44.3 fl      MPV 10.5 fL      Platelets 351 10*3/mm3      Neutrophil % 61.8 %      Lymphocyte % 24.4 %      Monocyte % 11.6 %      Eosinophil % 1.0 %      Basophil % 0.8  %      Immature Grans % 0.4 %      Neutrophils, Absolute 4.37 10*3/mm3      Lymphocytes, Absolute 1.73 10*3/mm3      Monocytes, Absolute 0.82 10*3/mm3      Eosinophils, Absolute 0.07 10*3/mm3      Basophils, Absolute 0.06 10*3/mm3      Immature Grans, Absolute 0.03 10*3/mm3      nRBC 0.0 /100 WBC     Narrative:       Appended report.  These results have been appended to a previously verified report.    Scan Slide [[id number]] Collected:  01/31/18 0352    Specimen:  Blood Updated:  01/31/18 0530     Hypochromia Slight/1+     Poikilocytes Slight/1+     Stomatocytes --      rare        Target Cells --      rare        WBC Morphology Normal     Platelet Morphology Normal    Troponin [[id number]]  (Abnormal) Collected:  01/31/18 1043    Specimen:  Blood Updated:  01/31/18 1202     Troponin I 0.055 (H) ng/mL           ECHO  On 1/25  · Left ventricular systolic function is moderately decreased. Estimated EF appears to be in the range of 31 - 35%.  · The left ventricular cavity is moderate-to-severely dilated.  · Left ventricular wall thickness is consistent with mild concentric hypertrophy.  · Left ventricular diastolic dysfunction.  · Right ventricular cavity is dilated. Moderately reduced right ventricular systolic function noted.  · Mild mitral valve regurgitation is present  · Mild tricuspid valve regurgitation is present. Estimated right ventricular systolic pressure from tricuspid regurgitation is mildly elevated (35-45 mmHg).      Chief Complaint on Day of Discharge: just feels bad and doesn't want to leave    History of Present Illness on Day of Discharge:  Patient states she continues to feel bad and her shortness of breath.  She just is not ready to leave.  She does however state that she is getting tired of having her blood drawn.  She thinks that she is continuing to have good urine output.  She is swallowing her pills on.  She is tolerating a diet.  She continues to require some assistance with getting to and  from the bathroom.  He denies chest pain or palpitations.    Hospital Course:  The patient is a 77 y.o. female who presented to [facility] with increasing weeping bilateral lower extremity edema that had worsened over the past 3 week duration.  She was a somewhat difficult historian on arrival to the ER however she did appear to have lower extremity edema with bilateral wounds as well as a very unkempt appearance.  He had not been being followed by a primary provider and she had no family present for healthcare decision making.  He did state that she had had progressive shortness of breath over this timeframe as well.  She was admitted from the ER with diagnosis of acute congestive heart failure with bilateral pleural effusions.  She was also initially thought to have a new diagnosis of A. fib however this was reevaluated and she wishes sinus tach with some PVCs.  She was also thought to possibly have a UTI and started on Rocephin and she was felt to have acute renal injury.    Patient was worked up for acute congestive heart failure with an echocardiogram and cardiology consult.  She was placed on medications including Coreg Imdur and hydralazine.  She was placed on IV Lasix and had diuresis of about 10 pounds.  She was feeling better symptomatically and her vital signs were tolerating her new medications.  Her etiology did recommend ischemic workup when she was more compensated and on outpatient basis.  She did not have any rising troponin while here or EKG changes concerning for ischemia.  She did have an echo that was concerning for an EF of around 30-35%.  An initial concern was that of A. fib however this was felt to be more consistent with sinus tach and PVCs.  When she was started on new medications she was not having any further issues with PVCs.    Guards to her lower [place] he winds these were being treated with daily dressings and Silvadene.  Her lower extremities were improving especially  "with diuresing.    Patient was fairly light in nature and had some behavioral issues with jerking away her arms slapping and trying to pull out her IV.  She even at one point asked for assistance to get up to go to the bathroom and then proceeded to squat down and have a bowel movement floor.  She had a wide affect however she did not really require psychiatric medications or any type of restraints.  Being evaluated by a behavioral health specialist in the future may be in her best interest.    Condition on Discharge:  improved    Physical Exam on Discharge:  /62 (BP Location: Left arm, Patient Position: Sitting)  Pulse 97  Temp 97.3 °F (36.3 °C) (Temporal Artery )   Resp 22  Ht 172.7 cm (67.99\")  Wt 71.3 kg (157 lb 3 oz)  SpO2 97%  BMI 23.91 kg/m2  Physical Exam   Constitutional: She is oriented to person, place, and time.   Thin, elderly   HENT:   Head: Normocephalic and atraumatic.   Eyes: Conjunctivae and EOM are normal. Pupils are equal, round, and reactive to light.   Neck: Neck supple. No JVD present. No thyromegaly present.   Cardiovascular: Normal rate, regular rhythm, normal heart sounds and intact distal pulses.  Exam reveals no gallop and no friction rub.    No murmur heard.  Pulmonary/Chest: Effort normal and breath sounds normal. No respiratory distress. She has no wheezes. She has no rales. She exhibits no tenderness.   Slightly diminished with occ crackle at bases   Abdominal: Soft. Bowel sounds are normal. She exhibits no distension. There is no tenderness. There is no rebound and no guarding.   Musculoskeletal: Normal range of motion. She exhibits no edema, tenderness or deformity.   Lymphadenopathy:     She has no cervical adenopathy.   Neurological: She is alert and oriented to person, place, and time. She displays normal reflexes. No cranial nerve deficit. She exhibits normal muscle tone.   Skin: Skin is warm and dry. No rash noted.   Psychiatric:   An odd behavior, sometimes " inappropriate but today she was ok         Discharge Disposition:  Skilled Nursing Facility (DC - External)    Discharge Medications:   Susana Delcid   Home Medication Instructions MARIFER:[id number]    Printed on:02/01/18 4955   Medication Information                      acetaminophen (TYLENOL) 325 MG tablet  Take 650 mg by mouth Every 6 (Six) Hours As Needed for Mild Pain .             carvedilol (COREG) 3.125 MG tablet  Take 1 tablet by mouth Every 12 (Twelve) Hours.             furosemide (LASIX) 20 MG tablet  Take 1 tablet by mouth 2 (Two) Times a Day.             hydrALAZINE (APRESOLINE) 50 MG tablet  Take 1 tablet by mouth Every 8 (Eight) Hours.             isosorbide dinitrate (ISORDIL) 20 MG tablet  Take 1 tablet by mouth Every 8 (Eight) Hours.             potassium chloride (MICRO-K) 10 MEQ CR capsule  Take 4 capsules by mouth Daily.             silver sulfadiazine (SILVADENE, SSD) 1 % cream  Apply  topically Every 12 (Twelve) Hours.                 Discharge Diet:   Diet Instructions     Diet: Cardiac; Thin       Discharge Diet:  Cardiac   Fluid Consistency:  Thin                 Activity at Discharge:   Activity Instructions     Activity as Tolerated                     Discharge Care Plan/Instructions:     Follow-up Appointments:   NH provider  Per dr estrella with cardiology    Test Results Pending at Discharge: none    Mary Finn MD  02/01/18  1:05 PM    Time: 35 min

## 2018-02-02 ENCOUNTER — LAB REQUISITION (OUTPATIENT)
Dept: LAB | Facility: HOSPITAL | Age: 78
End: 2018-02-02

## 2018-02-02 DIAGNOSIS — Z00.00 ENCOUNTER FOR GENERAL ADULT MEDICAL EXAMINATION WITHOUT ABNORMAL FINDINGS: ICD-10-CM

## 2018-02-02 LAB
25(OH)D3 SERPL-MCNC: <12.8 NG/ML (ref 30–100)
ALBUMIN SERPL-MCNC: 2.5 G/DL (ref 3.5–5)
ALP SERPL-CCNC: 113 U/L (ref 24–120)
ALT SERPL W P-5'-P-CCNC: 29 U/L (ref 0–54)
ANION GAP SERPL CALCULATED.3IONS-SCNC: 9 MMOL/L (ref 4–13)
ARTICHOKE IGE QN: 79 MG/DL (ref 0–99)
AST SERPL-CCNC: 20 U/L (ref 7–45)
BILIRUB CONJ SERPL-MCNC: 0 MG/DL (ref 0–0.3)
BILIRUB INDIRECT SERPL-MCNC: 0.1 MG/DL (ref 0–1.1)
BILIRUB SERPL-MCNC: 0.4 MG/DL (ref 0.1–1)
BUN BLD-MCNC: 24 MG/DL (ref 5–21)
BUN/CREAT SERPL: 19.7 (ref 7–25)
CALCIUM SPEC-SCNC: 8.8 MG/DL (ref 8.4–10.4)
CHLORIDE SERPL-SCNC: 97 MMOL/L (ref 98–110)
CHOLEST SERPL-MCNC: 141 MG/DL (ref 130–200)
CO2 SERPL-SCNC: 29 MMOL/L (ref 24–31)
CREAT BLD-MCNC: 1.22 MG/DL (ref 0.5–1.4)
DEPRECATED RDW RBC AUTO: 45.9 FL (ref 40–54)
ERYTHROCYTE [DISTWIDTH] IN BLOOD BY AUTOMATED COUNT: 20.1 % (ref 12–15)
GFR SERPL CREATININE-BSD FRML MDRD: 43 ML/MIN/1.73
GLUCOSE BLD-MCNC: 82 MG/DL (ref 70–100)
HBA1C MFR BLD: 6.3 %
HCT VFR BLD AUTO: 27.4 % (ref 37–47)
HDLC SERPL-MCNC: 44 MG/DL
HGB BLD-MCNC: 8.3 G/DL (ref 12–16)
LDLC/HDLC SERPL: 1.45 {RATIO}
MCH RBC QN AUTO: 22.4 PG (ref 28–32)
MCHC RBC AUTO-ENTMCNC: 30.3 G/DL (ref 33–36)
MCV RBC AUTO: 74.1 FL (ref 82–98)
PLATELET # BLD AUTO: 392 10*3/MM3 (ref 130–400)
PMV BLD AUTO: 10.5 FL (ref 6–12)
POTASSIUM BLD-SCNC: 4.5 MMOL/L (ref 3.5–5.3)
PREALB SERPL-MCNC: 12.3 MG/DL (ref 18–36)
PROT SERPL-MCNC: 5.5 G/DL (ref 6.3–8.7)
RBC # BLD AUTO: 3.7 10*6/MM3 (ref 4.2–5.4)
SODIUM BLD-SCNC: 135 MMOL/L (ref 135–145)
TRIGL SERPL-MCNC: 165 MG/DL (ref 0–149)
TSH SERPL DL<=0.05 MIU/L-ACNC: 1.27 MIU/ML (ref 0.47–4.68)
VIT B12 BLD-MCNC: 300 PG/ML (ref 239–931)
WBC NRBC COR # BLD: 7 10*3/MM3 (ref 4.8–10.8)

## 2018-02-02 PROCEDURE — 80061 LIPID PANEL: CPT | Performed by: INTERNAL MEDICINE

## 2018-02-02 PROCEDURE — 80076 HEPATIC FUNCTION PANEL: CPT | Performed by: INTERNAL MEDICINE

## 2018-02-02 PROCEDURE — 83036 HEMOGLOBIN GLYCOSYLATED A1C: CPT | Performed by: INTERNAL MEDICINE

## 2018-02-02 PROCEDURE — 84443 ASSAY THYROID STIM HORMONE: CPT | Performed by: INTERNAL MEDICINE

## 2018-02-02 PROCEDURE — 80048 BASIC METABOLIC PNL TOTAL CA: CPT | Performed by: INTERNAL MEDICINE

## 2018-02-02 PROCEDURE — 85027 COMPLETE CBC AUTOMATED: CPT | Performed by: INTERNAL MEDICINE

## 2018-02-02 PROCEDURE — 84134 ASSAY OF PREALBUMIN: CPT | Performed by: INTERNAL MEDICINE

## 2018-02-02 PROCEDURE — 82607 VITAMIN B-12: CPT | Performed by: INTERNAL MEDICINE

## 2018-02-02 PROCEDURE — 36415 COLL VENOUS BLD VENIPUNCTURE: CPT | Performed by: INTERNAL MEDICINE

## 2018-02-02 PROCEDURE — 82306 VITAMIN D 25 HYDROXY: CPT | Performed by: INTERNAL MEDICINE

## 2018-02-02 NOTE — THERAPY DISCHARGE NOTE
Acute Care - Physical Therapy Discharge Summary  [facility]       Patient Name: Susana Delcid  : 1940  MRN: [id number]    Today's Date: 2018  Onset of Illness/Injury or Date of Surgery Date: 18    Date of Referral to PT: 18  Referring Physician: Dr. Finn      Admit Date: 2018      PT Recommendation and Plan    Visit Dx:    ICD-10-CM ICD-9-CM   1. Acute congestive heart failure, unspecified congestive heart failure type I50.9 428.0   2. Impaired functional mobility, balance, gait, and endurance Z74.09 V49.89             Outcome Measures       18 1100 18 1100 18 1504    How much help from another person do you currently need...    Turning from your back to your side while in flat bed without using bedrails? 4  -JESSICA 4  -JESSICA 3  -ALEC    Moving from lying on back to sitting on the side of a flat bed without bedrails? 4  -JESSICA 4  -JESSICA 3  -ALEC    Moving to and from a bed to a chair (including a wheelchair)? 3  -JESSICA 3  -JESSICA 3  -ALEC    Standing up from a chair using your arms (e.g., wheelchair, bedside chair)? 4  -JESSICA 3  -JESSICA 3  -ALEC    Climbing 3-5 steps with a railing? 1  -JESSICA 1  -JESSICA 1  -ALEC    To walk in hospital room? 3  -JESSICA 3  -JESSICA 3  -ALEC    AM-PAC 6 Clicks Score 19  -JESSICA 18  -JESSICA 16  -ALEC    Functional Assessment    Outcome Measure Options   AM-PAC 6 Clicks Basic Mobility (PT)  -ALEC      User Key  (r) = Recorded By, (t) = Taken By, (c) = Cosigned By    Initials Name Provider Type    ALEC Pena, PT DPT Physical Therapist    JESSICA [place], CARLOS Physical Therapy Assistant                      IP PT Goals       18 0815 18 1537 18 1417    Transfer Training PT LTG    Transfer Training PT LTG, Date Established  18  -PB (r) JM (t) PB (c) 18  -PB (r) JM (t) PB (c)    Transfer Training PT LTG, Time to Achieve   by discharge  -PB (r) JM (t) PB (c)    Transfer Training PT LTG, Activity Type   bed to chair /chair to bed;sit to stand/stand to sit  -PB (r) JAMES (t) PB  (c)    Transfer Training PT LTG, [place] Level   conditional independence  -PB (r) JM (t) PB (c)    Transfer Training PT LTG, Assist Device   cane, straight  -PB (r) JM (t) PB (c)    Transfer Training PT  LTG, Date Goal Reviewed 02/02/18  -      Transfer Training PT LTG, Outcome goal met  -      Gait Training PT LTG    Gait Training Goal PT LTG, Date Established  01/29/18  -PB (r) JM (t) PB (c) 01/29/18  -PB (r) JM (t) PB (c)    Gait Training Goal PT LTG, Time to Achieve   by discharge  -PB (r) JM (t) PB (c)    Gait Training Goal PT LTG, [place] Level   supervision required  -PB (r) JM (t) PB (c)    Gait Training Goal PT LTG, Assist Device   cane, straight  -PB (r) JM (t) PB (c)    Gait Training Goal PT LTG, Distance to Achieve   150 feet  -PB (r) JM (t) PB (c)    Gait Training Goal PT LTG, Date Goal Reviewed 02/02/18  -      Gait Training Goal PT LTG, Outcome goal not met  -      Gait Training Goal PT LTG, Reason Goal Not Met discharged from facility  -        User Key  (r) = Recorded By, (t) = Taken By, (c) = Cosigned By    Initials Name Provider Type     Talya Loja, PTA Physical Therapy Assistant    GEREMIAS Biggs, PT DPT Physical Therapist    JAMES Hayward, PT Student PT Student              PT Discharge Summary  Reason for Discharge: Discharge from facility  Outcomes Achieved: Refer to plan of care for updates on goals achieved  Discharge Destination: [facility]      Talya Loja PTA   2/2/2018

## 2018-02-02 NOTE — PLAN OF CARE
Problem: Inpatient Physical Therapy  Goal: Transfer Training Goal 1 LTG- PT  Outcome: Outcome(s) achieved Date Met: 02/02/18 01/29/18 1417 01/29/18 1537 02/02/18 0815   Transfer Training PT LTG   Transfer Training PT LTG, Date Established --  01/29/18 --    Transfer Training PT LTG, Time to Achieve by discharge --  --    Transfer Training PT LTG, Activity Type bed to chair /chair to bed;sit to stand/stand to sit --  --    Transfer Training PT LTG, [place] Level conditional independence --  --    Transfer Training PT LTG, Assist Device cane, straight --  --    Transfer Training PT LTG, Date Goal Reviewed --  --  02/02/18   Transfer Training PT LTG, Outcome --  --  goal met     Goal: Gait Training Goal LTG- PT  Outcome: Unable to achieve outcome(s) by discharge Date Met: 02/02/18 01/29/18 1417 01/29/18 1537 02/02/18 0815   Gait Training PT LTG   Gait Training Goal PT LTG, Date Established --  01/29/18 --    Gait Training Goal PT LTG, Time to Achieve by discharge --  --    Gait Training Goal PT LTG, [place] Level supervision required --  --    Gait Training Goal PT LTG, Assist Device cane, straight --  --    Gait Training Goal PT LTG, Distance to Achieve 150 feet --  --    Gait Training Goal PT LTG, Date Goal Reviewed --  --  02/02/18   Gait Training Goal PT LTG, Outcome --  --  goal not met   Gait Training Goal PT LTG, Reason Goal Not Met --  --  discharged from facility

## 2018-02-15 ENCOUNTER — LAB REQUISITION (OUTPATIENT)
Dept: LAB | Facility: HOSPITAL | Age: 78
End: 2018-02-15

## 2018-02-15 DIAGNOSIS — Z00.00 ENCOUNTER FOR GENERAL ADULT MEDICAL EXAMINATION WITHOUT ABNORMAL FINDINGS: ICD-10-CM

## 2018-02-15 LAB
ALBUMIN SERPL-MCNC: 2.7 G/DL (ref 3.5–5)
ALBUMIN/GLOB SERPL: 0.9 G/DL (ref 1.1–2.5)
ALP SERPL-CCNC: 84 U/L (ref 24–120)
ALT SERPL W P-5'-P-CCNC: 30 U/L (ref 0–54)
ANION GAP SERPL CALCULATED.3IONS-SCNC: 8 MMOL/L (ref 4–13)
AST SERPL-CCNC: 21 U/L (ref 7–45)
BILIRUB SERPL-MCNC: 0.3 MG/DL (ref 0.1–1)
BUN BLD-MCNC: 17 MG/DL (ref 5–21)
BUN/CREAT SERPL: 15.9 (ref 7–25)
CALCIUM SPEC-SCNC: 9 MG/DL (ref 8.4–10.4)
CHLORIDE SERPL-SCNC: 100 MMOL/L (ref 98–110)
CO2 SERPL-SCNC: 29 MMOL/L (ref 24–31)
CREAT BLD-MCNC: 1.07 MG/DL (ref 0.5–1.4)
DEPRECATED RDW RBC AUTO: 64.5 FL (ref 40–54)
ERYTHROCYTE [DISTWIDTH] IN BLOOD BY AUTOMATED COUNT: 23.8 % (ref 12–15)
GFR SERPL CREATININE-BSD FRML MDRD: 50 ML/MIN/1.73
GLOBULIN UR ELPH-MCNC: 2.9 GM/DL
GLUCOSE BLD-MCNC: 82 MG/DL (ref 70–100)
HCT VFR BLD AUTO: 28.6 % (ref 37–47)
HGB BLD-MCNC: 8.4 G/DL (ref 12–16)
MAGNESIUM SERPL-MCNC: 2 MG/DL (ref 1.4–2.2)
MCH RBC QN AUTO: 22.9 PG (ref 28–32)
MCHC RBC AUTO-ENTMCNC: 29.4 G/DL (ref 33–36)
MCV RBC AUTO: 77.9 FL (ref 82–98)
PLATELET # BLD AUTO: 424 10*3/MM3 (ref 130–400)
PMV BLD AUTO: 10.6 FL (ref 6–12)
POTASSIUM BLD-SCNC: 4.1 MMOL/L (ref 3.5–5.3)
PROT SERPL-MCNC: 5.6 G/DL (ref 6.3–8.7)
RBC # BLD AUTO: 3.67 10*6/MM3 (ref 4.2–5.4)
SODIUM BLD-SCNC: 137 MMOL/L (ref 135–145)
WBC NRBC COR # BLD: 6.23 10*3/MM3 (ref 4.8–10.8)

## 2018-02-15 PROCEDURE — 83735 ASSAY OF MAGNESIUM: CPT | Performed by: INTERNAL MEDICINE

## 2018-02-15 PROCEDURE — 80053 COMPREHEN METABOLIC PANEL: CPT | Performed by: INTERNAL MEDICINE

## 2018-02-15 PROCEDURE — 36415 COLL VENOUS BLD VENIPUNCTURE: CPT | Performed by: INTERNAL MEDICINE

## 2018-02-15 PROCEDURE — 85027 COMPLETE CBC AUTOMATED: CPT | Performed by: INTERNAL MEDICINE

## 2018-02-28 ENCOUNTER — LAB REQUISITION (OUTPATIENT)
Dept: LAB | Facility: HOSPITAL | Age: 78
End: 2018-02-28

## 2018-02-28 DIAGNOSIS — Z00.00 ENCOUNTER FOR GENERAL ADULT MEDICAL EXAMINATION WITHOUT ABNORMAL FINDINGS: ICD-10-CM

## 2018-02-28 LAB
ALBUMIN SERPL-MCNC: 3.2 G/DL (ref 3.5–5)
ALBUMIN/GLOB SERPL: 1.1 G/DL (ref 1.1–2.5)
ALP SERPL-CCNC: 85 U/L (ref 24–120)
ALT SERPL W P-5'-P-CCNC: 30 U/L (ref 0–54)
ANION GAP SERPL CALCULATED.3IONS-SCNC: 11 MMOL/L (ref 4–13)
AST SERPL-CCNC: 22 U/L (ref 7–45)
BILIRUB SERPL-MCNC: 0.3 MG/DL (ref 0.1–1)
BUN BLD-MCNC: 30 MG/DL (ref 5–21)
BUN/CREAT SERPL: 23.8 (ref 7–25)
CALCIUM SPEC-SCNC: 9.1 MG/DL (ref 8.4–10.4)
CHLORIDE SERPL-SCNC: 100 MMOL/L (ref 98–110)
CO2 SERPL-SCNC: 30 MMOL/L (ref 24–31)
CREAT BLD-MCNC: 1.26 MG/DL (ref 0.5–1.4)
DEPRECATED RDW RBC AUTO: 68.6 FL (ref 40–54)
ERYTHROCYTE [DISTWIDTH] IN BLOOD BY AUTOMATED COUNT: 24.4 % (ref 12–15)
GFR SERPL CREATININE-BSD FRML MDRD: 41 ML/MIN/1.73
GLOBULIN UR ELPH-MCNC: 3 GM/DL
GLUCOSE BLD-MCNC: 84 MG/DL (ref 70–100)
HCT VFR BLD AUTO: 30.7 % (ref 37–47)
HGB BLD-MCNC: 9.3 G/DL (ref 12–16)
MAGNESIUM SERPL-MCNC: 2.2 MG/DL (ref 1.4–2.2)
MCH RBC QN AUTO: 24.2 PG (ref 28–32)
MCHC RBC AUTO-ENTMCNC: 30.3 G/DL (ref 33–36)
MCV RBC AUTO: 79.9 FL (ref 82–98)
PLATELET # BLD AUTO: 257 10*3/MM3 (ref 130–400)
PMV BLD AUTO: 10.9 FL (ref 6–12)
POTASSIUM BLD-SCNC: 4.2 MMOL/L (ref 3.5–5.3)
PROT SERPL-MCNC: 6.2 G/DL (ref 6.3–8.7)
RBC # BLD AUTO: 3.84 10*6/MM3 (ref 4.2–5.4)
SODIUM BLD-SCNC: 141 MMOL/L (ref 135–145)
WBC NRBC COR # BLD: 6.64 10*3/MM3 (ref 4.8–10.8)

## 2018-02-28 PROCEDURE — 83735 ASSAY OF MAGNESIUM: CPT | Performed by: INTERNAL MEDICINE

## 2018-02-28 PROCEDURE — 85027 COMPLETE CBC AUTOMATED: CPT | Performed by: INTERNAL MEDICINE

## 2018-02-28 PROCEDURE — 36415 COLL VENOUS BLD VENIPUNCTURE: CPT | Performed by: INTERNAL MEDICINE

## 2018-02-28 PROCEDURE — 80053 COMPREHEN METABOLIC PANEL: CPT | Performed by: INTERNAL MEDICINE

## 2018-03-10 ENCOUNTER — LAB REQUISITION (OUTPATIENT)
Dept: LAB | Facility: HOSPITAL | Age: 78
End: 2018-03-10

## 2018-03-10 DIAGNOSIS — Z00.00 ENCOUNTER FOR GENERAL ADULT MEDICAL EXAMINATION WITHOUT ABNORMAL FINDINGS: ICD-10-CM

## 2018-03-10 LAB
FLUAV AG NPH QL: NEGATIVE
FLUBV AG NPH QL IA: NEGATIVE

## 2018-03-10 PROCEDURE — 87804 INFLUENZA ASSAY W/OPTIC: CPT | Performed by: NURSE PRACTITIONER

## 2018-03-14 ENCOUNTER — LAB REQUISITION (OUTPATIENT)
Dept: LAB | Facility: HOSPITAL | Age: 78
End: 2018-03-14

## 2018-03-14 DIAGNOSIS — Z00.00 ENCOUNTER FOR GENERAL ADULT MEDICAL EXAMINATION WITHOUT ABNORMAL FINDINGS: ICD-10-CM

## 2018-03-14 LAB
ALBUMIN SERPL-MCNC: 3.5 G/DL (ref 3.5–5)
ALBUMIN/GLOB SERPL: 1.1 G/DL (ref 1.1–2.5)
ALP SERPL-CCNC: 101 U/L (ref 24–120)
ALT SERPL W P-5'-P-CCNC: 53 U/L (ref 0–54)
ANION GAP SERPL CALCULATED.3IONS-SCNC: 13 MMOL/L (ref 4–13)
ANISOCYTOSIS BLD QL: ABNORMAL
AST SERPL-CCNC: 54 U/L (ref 7–45)
BILIRUB SERPL-MCNC: 0.3 MG/DL (ref 0.1–1)
BUN BLD-MCNC: 46 MG/DL (ref 5–21)
BUN/CREAT SERPL: 35.7 (ref 7–25)
CALCIUM SPEC-SCNC: 8.8 MG/DL (ref 8.4–10.4)
CHLORIDE SERPL-SCNC: 98 MMOL/L (ref 98–110)
CO2 SERPL-SCNC: 28 MMOL/L (ref 24–31)
CREAT BLD-MCNC: 1.29 MG/DL (ref 0.5–1.4)
DEPRECATED RDW RBC AUTO: 67.7 FL (ref 40–54)
EOSINOPHIL # BLD MANUAL: 0.1 10*3/MM3 (ref 0–0.7)
EOSINOPHIL # BLD MANUAL: 0.1 10*3/MM3 (ref 0–0.7)
EOSINOPHIL NFR BLD MANUAL: 2 % (ref 0–4)
EOSINOPHIL NFR BLD MANUAL: 2 % (ref 0–4)
ERYTHROCYTE [DISTWIDTH] IN BLOOD BY AUTOMATED COUNT: 23.8 % (ref 12–15)
GFR SERPL CREATININE-BSD FRML MDRD: 40 ML/MIN/1.73
GLOBULIN UR ELPH-MCNC: 3.3 GM/DL
GLUCOSE BLD-MCNC: 109 MG/DL (ref 70–100)
HCT VFR BLD AUTO: 33.1 % (ref 37–47)
HGB BLD-MCNC: 10.1 G/DL (ref 12–16)
HYPOCHROMIA BLD QL: ABNORMAL
LYMPHOCYTES # BLD MANUAL: 2.78 10*3/MM3 (ref 0.72–4.86)
LYMPHOCYTES # BLD MANUAL: 2.78 10*3/MM3 (ref 0.72–4.86)
LYMPHOCYTES NFR BLD MANUAL: 4 % (ref 4–12)
LYMPHOCYTES NFR BLD MANUAL: 4 % (ref 4–12)
LYMPHOCYTES NFR BLD MANUAL: 55 % (ref 15–45)
LYMPHOCYTES NFR BLD MANUAL: 55 % (ref 15–45)
MAGNESIUM SERPL-MCNC: 2.1 MG/DL (ref 1.4–2.2)
MCH RBC QN AUTO: 24.7 PG (ref 28–32)
MCHC RBC AUTO-ENTMCNC: 30.5 G/DL (ref 33–36)
MCV RBC AUTO: 80.9 FL (ref 82–98)
MONOCYTES # BLD AUTO: 0.2 10*3/MM3 (ref 0.19–1.3)
MONOCYTES # BLD AUTO: 0.2 10*3/MM3 (ref 0.19–1.3)
NEUTROPHILS # BLD AUTO: 1.97 10*3/MM3 (ref 1.87–8.4)
NEUTROPHILS # BLD AUTO: 1.97 10*3/MM3 (ref 1.87–8.4)
NEUTROPHILS NFR BLD MANUAL: 31 % (ref 39–78)
NEUTROPHILS NFR BLD MANUAL: 31 % (ref 39–78)
NEUTS BAND NFR BLD MANUAL: 8 % (ref 0–10)
NEUTS BAND NFR BLD MANUAL: 8 % (ref 0–10)
PLAT MORPH BLD: NORMAL
PLAT MORPH BLD: NORMAL
PLATELET # BLD AUTO: 236 10*3/MM3 (ref 130–400)
PMV BLD AUTO: 10.8 FL (ref 6–12)
POTASSIUM BLD-SCNC: 3.8 MMOL/L (ref 3.5–5.3)
PROT SERPL-MCNC: 6.8 G/DL (ref 6.3–8.7)
RBC # BLD AUTO: 4.09 10*6/MM3 (ref 4.2–5.4)
RBC MORPH BLD: NORMAL
SODIUM BLD-SCNC: 139 MMOL/L (ref 135–145)
WBC MORPH BLD: NORMAL
WBC MORPH BLD: NORMAL
WBC NRBC COR # BLD: 5.05 10*3/MM3 (ref 4.8–10.8)

## 2018-03-14 PROCEDURE — 80053 COMPREHEN METABOLIC PANEL: CPT | Performed by: INTERNAL MEDICINE

## 2018-03-14 PROCEDURE — 85025 COMPLETE CBC W/AUTO DIFF WBC: CPT | Performed by: INTERNAL MEDICINE

## 2018-03-14 PROCEDURE — 83735 ASSAY OF MAGNESIUM: CPT | Performed by: INTERNAL MEDICINE

## 2018-03-14 PROCEDURE — 36415 COLL VENOUS BLD VENIPUNCTURE: CPT | Performed by: INTERNAL MEDICINE

## 2018-04-24 ENCOUNTER — HOSPITAL ENCOUNTER (EMERGENCY)
Facility: HOSPITAL | Age: 78
Discharge: HOME | End: 2018-04-24
Admitting: EMERGENCY MEDICINE

## 2018-04-24 ENCOUNTER — APPOINTMENT (OUTPATIENT)
Dept: CT IMAGING | Facility: HOSPITAL | Age: 78
End: 2018-04-24

## 2018-04-24 VITALS
HEIGHT: 69 IN | SYSTOLIC BLOOD PRESSURE: 148 MMHG | BODY MASS INDEX: 21.48 KG/M2 | HEART RATE: 85 BPM | RESPIRATION RATE: 16 BRPM | DIASTOLIC BLOOD PRESSURE: 78 MMHG | OXYGEN SATURATION: 95 % | WEIGHT: 145 LBS | TEMPERATURE: 97.5 F
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
## 2018-04-24 DIAGNOSIS — S09.90XA INJURY OF HEAD, INITIAL ENCOUNTER: Primary | ICD-10-CM

## 2018-04-24 PROCEDURE — 99284 EMERGENCY DEPT VISIT MOD MDM: CPT

## 2018-04-24 PROCEDURE — 72125 CT NECK SPINE W/O DYE: CPT

## 2018-04-24 PROCEDURE — 70450 CT HEAD/BRAIN W/O DYE: CPT

## 2018-04-25 NOTE — ED NOTES
Cleaned laceration to the back of pt's head.  Pt tolerated well.  Awaiting CT results.     Talya Clayton RN  04/24/18 8482

## 2018-04-25 NOTE — ED NOTES
Gauze dressing placed on area.  TINA Marte NP said that she would not be able to put any stitches or staples in the area due to it just being an abrasion.     Talya Clayton RN  04/24/18 8141

## 2018-04-25 NOTE — DISCHARGE INSTRUCTIONS
Please follow up with your PCP in 1-2 days for a recheck   R/t to the ER as needed    Fall Prevention in Hospitals, Adult  WHAT ARE SOME SAFETY TIPS FOR PREVENTING FALLS?  If you or a loved one has to stay in the hospital, talk with the health care providers about the risk of falling. Find out which medicines or treatments can cause dizziness or affect balance. Make a plan with the health care providers to prevent falls. The plan may include these points:  · Ask for help moving around, especially after surgery or when feeling unwell.  · Have support available when getting up and moving around.  · Wear nonskid footwear.  · Use the safety straps on wheelchairs.  · Ask for help to get objects that are out of reach.  · Wear eyeglasses.  · Remove all clutter from the floor and the sides of the bed.  · Keep equipment and wires securely out of the way.  · Keep the bed locked in the low position.  · Keep the side rails up on the bed.  · Keep the nurse call button within reach.  · Keep the door open when no one else is in the room.  · Have someone stay in the hospital with you or your loved one.  · Ask for a bed alarm if you are not able to stay with your loved one who is at risk for getting up without help.  · Ask if sleeping pills or other medicines that alter mental status are necessary.  WHAT INCREASES THE RISK FOR FALLS?  Certain conditions and treatments may increase a patient's risk for falls in a hospital. These include:  · Being in an unfamiliar environment.  · Being on bed rest.  · Having surgery.  · Taking certain medicines, such as sleeping pills.  · Having tubes in place, such as IV lines or catheters.  Additional risk factors for falls in a hospital include:  · Having vision problems.  · Having a change in thinking, feeling, or behavior (altered mental status).  · Having trouble with balance.  · Needing to use the toilet frequently.  · Having fallen in the past three months.  · Having low blood pressure when  standing up quickly (orthostatic hypotension).  WHAT DOES THE HOSPITAL STAFF DO TO HELP PREVENT ME OR MY LOVED ONE FROM FALLING?  Hospitals have systems in place to prevent falls and accidents. Talk with the hospital staff about:  · Doing an assessment to discuss fall risks and create a personalized plan to prevent falls.  · Checking in regularly to see if help is needed for moving around and to assess any changes in fall risk.  · Knowing where the nurse call button is and how to use it. Use this to call a nursing care provider any time.  · Keeping personal items within reach. This includes eyeglasses, phones, and other electronic devices.  · Following general safety guidelines when moving around.  · Keeping the area around the bed free from clutter.  · Removing unnecessary equipment or tubes to reduce the risk of tripping.  · Using safety equipment, such as:  ¨ Walkers, crutches, and other walking devices for support.  ¨ Safety rails on the bed.  ¨ Safety straps in the bed.  ¨ A bed that can be lowered and locked to prevent movement.  ¨ Handrails in the bathroom.  ¨ Nonskid socks and shoes.  ¨ Locking mechanisms to secure equipment in place.  ¨ Lifting and transfer equipment.  WHAT CAN I DO TO HELP PREVENT A FALL?  · Talk with health care providers about fall prevention.  · Have a personalized fall prevention plan in place.  · Do not try to move around if you feel off balance or ill.  · Change position slowly.  · Sit on the side of the bed before standing up.  · Sit down and call for help if you feel dizzy or unsteady when standing.  · Keep the hospital room clear of clutter.  WHEN SHOULD I ASK FOR HELP?  Ask for help whenever you:  · Are not sure if you are able to move around safely.  · Feel dizzy or unsteady.  · Are not comfortable helping your loved one move around or use the bathroom.  If you or a loved one falls, tell the hospital staff. This is important.  This information is not intended to replace advice  given to you by your health care provider. Make sure you discuss any questions you have with your health care provider.  Document Released: 12/15/2001 Document Revised: 05/16/2017 Document Reviewed: 09/29/2016  Elsevier Interactive Patient Education © 2017 [employer]

## 2018-04-25 NOTE — ED PROVIDER NOTES
Subjective   Patient is a 78-year-old  female that presents to the ER today per EMS from [facility] with complaint of fall.  Patient reports that she thinks there may have been some water on the floor and when she was walking she slipped and fell.  She states that she landed falling backwards and hit the back of her head.  The patient states that she is not having any back or hip pain.  She denies any head pain or neck pain.  Patient denies any loss of consciousness.  The patient does have a laceration to the back of her head.  Bleeding is controlled.  The patient reports that she has no pain at this time she does not understand why she is here in the ER.  She presents ER today for further evaluation.        History provided by:  Patient   used: No    Fall   Mechanism of injury: fall    Injury location:  Head/neck  Head/neck injury location:  Head, L neck and R neck  Incident location:  [facility]  Time since incident:  1 hour  Arrived directly from scene: yes    Fall:     Fall occurred:  Standing    Impact surface:  Hard floor    Point of impact:  Head    Entrapped after fall: no    Suspicion of alcohol use: no    Suspicion of drug use: no    Associated symptoms: no abdominal pain, no back pain, no blindness, no chest pain, no difficulty breathing, no headaches, no hearing loss, no loss of consciousness, no nausea, no neck pain, no seizures and no vomiting    Risk factors: no AICD, no anticoagulation therapy, no asthma, no beta blocker therapy, no CABG, no CAD, no CHF, no COPD, no diabetes, no dialysis, no hemophilia, no kidney disease, no pacemaker, no past MI, not pregnant and no steroid use        Review of Systems   HENT: Negative for hearing loss.    Eyes: Negative for blindness.   Cardiovascular: Negative for chest pain.   Gastrointestinal: Negative for abdominal pain, nausea and vomiting.   Musculoskeletal: Negative for back pain and neck pain.   Neurological:  Negative for seizures, loss of consciousness and headaches.   All other systems reviewed and are negative.      Past Medical History:   Diagnosis Date   • Hyperlipidemia    • Hypertension        Allergies   Allergen Reactions   • Aspirin      Unknown   • Codeine      Unknown     • Motrin [Ibuprofen]      Unknown       Past Surgical History:   Procedure Laterality Date   • APPENDECTOMY     • HYSTERECTOMY     • TONSILLECTOMY     • WRIST SURGERY Right        History reviewed. No pertinent family history.    Social History     Social History   • Marital status:      Social History Main Topics   • Smoking status: Never Smoker   • Smokeless tobacco: Never Used   • Alcohol use No   • Drug use: No   • Sexual activity: Defer     Other Topics Concern   • Not on file           Objective   Physical Exam   Constitutional: She is oriented to person, place, and time. She appears well-developed and well-nourished.   HENT:   Head: Normocephalic and atraumatic.   Eyes: Conjunctivae are normal. Pupils are equal, round, and reactive to light.   Neck:       Cardiovascular: Normal rate, regular rhythm and normal heart sounds.    Pulmonary/Chest: Effort normal and breath sounds normal.   Neurological: She is alert and oriented to person, place, and time.   Skin: Skin is warm and dry. Capillary refill takes less than 2 seconds.   Psychiatric: She has a normal mood and affect.   Nursing note and vitals reviewed.      Procedures         ED Course  ED Course   Comment By Time   Pt CT scan cervical spine and head reviewed; discussed with Dr. Mustafa. The pt has been able to ambulate in the ER without difficulty. Denies pain. Pt reports that she is UTD on her Td vaccine. Pt will be DC home at this time in stable cond; advised to r/t to the ER as needed. Pt abrasion to back of head was cleaned, the pt tolerated well. Advised to keep area clean and dry; return to the ER as needed.  Rossy Marte, APRN 04/24 [id number]        CT Cervical  Spine Without Contrast   Final Result   CT head. Minute right parasagittal parietal/epidural fat stranding,   without underlying calvarial fracture or acute intracranial   abnormalities.       CT cervical spine. No acute osseous posttraumatic changes.   This report was finalized on 04/24/2018 22:07 by Dr. Lori Wayne MD.      CT Head Without Contrast   Final Result   CT head. Minute right parasagittal parietal/epidural fat stranding,   without underlying calvarial fracture or acute intracranial   abnormalities.       CT cervical spine. No acute osseous posttraumatic changes.   This report was finalized on 04/24/2018 22:07 by Dr. Lori Wayne MD.        Lab Results (last 24 hours)     ** No results found for the last 24 hours. **                  MDM  Number of Diagnoses or Management Options  Fall, initial encounter: new and requires workup  Injury of head, initial encounter: new and requires workup     Amount and/or Complexity of Data Reviewed  Tests in the radiology section of CPT®: ordered and reviewed    Patient Progress  Patient progress: stable      Final diagnoses:   Injury of head, initial encounter   Fall, initial encounter            Rossy Marte, APRN  04/24/18 [id number]

## 2018-04-25 NOTE — ED NOTES
Tried to apply c-collar as ordered and pt refused for me to put it on her.  I explained the reason whey it was ordered and pt once again said she did not want it and said that she is not hurting.     Talya Clayton RN  04/24/18 [id number]

## 2018-04-25 NOTE — ED NOTES
Assisted pt with going to the bathroom.  Pt ambulated with minimal assistance.     Talya Clayton RN  04/24/18 [id number]

## 2018-05-07 ENCOUNTER — LAB REQUISITION (OUTPATIENT)
Dept: LAB | Facility: HOSPITAL | Age: 78
End: 2018-05-07

## 2018-05-07 DIAGNOSIS — Z00.00 ENCOUNTER FOR GENERAL ADULT MEDICAL EXAMINATION WITHOUT ABNORMAL FINDINGS: ICD-10-CM

## 2018-05-07 LAB
ANION GAP SERPL CALCULATED.3IONS-SCNC: 12 MMOL/L (ref 4–13)
BUN BLD-MCNC: 36 MG/DL (ref 5–21)
BUN/CREAT SERPL: 27.7 (ref 7–25)
CALCIUM SPEC-SCNC: 9.5 MG/DL (ref 8.4–10.4)
CHLORIDE SERPL-SCNC: 101 MMOL/L (ref 98–110)
CO2 SERPL-SCNC: 29 MMOL/L (ref 24–31)
CREAT BLD-MCNC: 1.3 MG/DL (ref 0.5–1.4)
GFR SERPL CREATININE-BSD FRML MDRD: 40 ML/MIN/1.73
GLUCOSE BLD-MCNC: 84 MG/DL (ref 70–100)
POTASSIUM BLD-SCNC: 4.4 MMOL/L (ref 3.5–5.3)
SODIUM BLD-SCNC: 142 MMOL/L (ref 135–145)

## 2018-05-07 PROCEDURE — 36415 COLL VENOUS BLD VENIPUNCTURE: CPT | Performed by: NURSE PRACTITIONER

## 2018-05-07 PROCEDURE — 80048 BASIC METABOLIC PNL TOTAL CA: CPT | Performed by: NURSE PRACTITIONER

## 2018-06-07 ENCOUNTER — LAB REQUISITION (OUTPATIENT)
Dept: LAB | Facility: HOSPITAL | Age: 78
End: 2018-06-07

## 2018-06-07 DIAGNOSIS — Z00.00 ENCOUNTER FOR GENERAL ADULT MEDICAL EXAMINATION WITHOUT ABNORMAL FINDINGS: ICD-10-CM

## 2018-06-07 LAB
ADV 40+41 DNA STL QL NAA+NON-PROBE: NOT DETECTED
ALBUMIN SERPL-MCNC: 2.8 G/DL (ref 3.5–5)
ALBUMIN/GLOB SERPL: 0.9 G/DL (ref 1.1–2.5)
ALP SERPL-CCNC: 109 U/L (ref 24–120)
ALT SERPL W P-5'-P-CCNC: 31 U/L (ref 0–54)
ANION GAP SERPL CALCULATED.3IONS-SCNC: 9 MMOL/L (ref 4–13)
AST SERPL-CCNC: 22 U/L (ref 7–45)
ASTRO TYP 1-8 RNA STL QL NAA+NON-PROBE: NOT DETECTED
BILIRUB SERPL-MCNC: 0.2 MG/DL (ref 0.1–1)
BUN BLD-MCNC: 29 MG/DL (ref 5–21)
BUN/CREAT SERPL: 23.6 (ref 7–25)
C CAYETANENSIS DNA STL QL NAA+NON-PROBE: NOT DETECTED
C DIFF TOX GENS STL QL NAA+PROBE: NOT DETECTED
CALCIUM SPEC-SCNC: 8.8 MG/DL (ref 8.4–10.4)
CAMPY SP DNA.DIARRHEA STL QL NAA+PROBE: NOT DETECTED
CHLORIDE SERPL-SCNC: 102 MMOL/L (ref 98–110)
CO2 SERPL-SCNC: 30 MMOL/L (ref 24–31)
CREAT BLD-MCNC: 1.23 MG/DL (ref 0.5–1.4)
CRYPTOSP STL CULT: NOT DETECTED
DEPRECATED RDW RBC AUTO: 46.2 FL (ref 40–54)
E COLI DNA SPEC QL NAA+PROBE: NOT DETECTED
E HISTOLYT AG STL-ACNC: NOT DETECTED
EAEC PAA PLAS AGGR+AATA ST NAA+NON-PRB: NOT DETECTED
EC STX1+STX2 GENES STL QL NAA+NON-PROBE: NOT DETECTED
EPEC EAE GENE STL QL NAA+NON-PROBE: NOT DETECTED
ERYTHROCYTE [DISTWIDTH] IN BLOOD BY AUTOMATED COUNT: 14.5 % (ref 12–15)
ETEC LTA+ST1A+ST1B TOX ST NAA+NON-PROBE: NOT DETECTED
G LAMBLIA DNA SPEC QL NAA+PROBE: NOT DETECTED
GFR SERPL CREATININE-BSD FRML MDRD: 42 ML/MIN/1.73
GLOBULIN UR ELPH-MCNC: 3.2 GM/DL
GLUCOSE BLD-MCNC: 85 MG/DL (ref 70–100)
HBA1C MFR BLD: 5.7 %
HCT VFR BLD AUTO: 28.6 % (ref 37–47)
HGB BLD-MCNC: 9 G/DL (ref 12–16)
MAGNESIUM SERPL-MCNC: 2.2 MG/DL (ref 1.4–2.2)
MCH RBC QN AUTO: 27.4 PG (ref 28–32)
MCHC RBC AUTO-ENTMCNC: 31.5 G/DL (ref 33–36)
MCV RBC AUTO: 87.2 FL (ref 82–98)
NOROVIRUS GI+II RNA STL QL NAA+NON-PROBE: NOT DETECTED
P SHIGELLOIDES DNA STL QL NAA+NON-PROBE: NOT DETECTED
PLATELET # BLD AUTO: 254 10*3/MM3 (ref 130–400)
PMV BLD AUTO: 10.8 FL (ref 6–12)
POTASSIUM BLD-SCNC: 4.4 MMOL/L (ref 3.5–5.3)
PROT SERPL-MCNC: 6 G/DL (ref 6.3–8.7)
RBC # BLD AUTO: 3.28 10*6/MM3 (ref 4.2–5.4)
RV RNA STL NAA+PROBE: NOT DETECTED
SALMONELLA DNA SPEC QL NAA+PROBE: NOT DETECTED
SAPO I+II+IV+V RNA STL QL NAA+NON-PROBE: NOT DETECTED
SHIGELLA SP+EIEC IPAH ST NAA+NON-PROBE: NOT DETECTED
SODIUM BLD-SCNC: 141 MMOL/L (ref 135–145)
V CHOLERAE DNA SPEC QL NAA+PROBE: NOT DETECTED
VIBRIO DNA SPEC NAA+PROBE: NOT DETECTED
WBC NRBC COR # BLD: 6.04 10*3/MM3 (ref 4.8–10.8)
YERSINIA STL CULT: NOT DETECTED

## 2018-06-07 PROCEDURE — 83036 HEMOGLOBIN GLYCOSYLATED A1C: CPT | Performed by: INTERNAL MEDICINE

## 2018-06-07 PROCEDURE — 85027 COMPLETE CBC AUTOMATED: CPT | Performed by: INTERNAL MEDICINE

## 2018-06-07 PROCEDURE — 87999 UNLISTED MICROBIOLOGY PX: CPT | Performed by: NURSE PRACTITIONER

## 2018-06-07 PROCEDURE — 83735 ASSAY OF MAGNESIUM: CPT | Performed by: INTERNAL MEDICINE

## 2018-06-07 PROCEDURE — 36415 COLL VENOUS BLD VENIPUNCTURE: CPT | Performed by: INTERNAL MEDICINE

## 2018-06-07 PROCEDURE — 80053 COMPREHEN METABOLIC PANEL: CPT | Performed by: INTERNAL MEDICINE

## 2018-06-12 ENCOUNTER — LAB REQUISITION (OUTPATIENT)
Dept: LAB | Facility: HOSPITAL | Age: 78
End: 2018-06-12

## 2018-06-12 DIAGNOSIS — Z00.00 ENCOUNTER FOR GENERAL ADULT MEDICAL EXAMINATION WITHOUT ABNORMAL FINDINGS: ICD-10-CM

## 2018-06-12 LAB
ANION GAP SERPL CALCULATED.3IONS-SCNC: 9 MMOL/L (ref 4–13)
BUN BLD-MCNC: 37 MG/DL (ref 5–21)
BUN/CREAT SERPL: 24.3 (ref 7–25)
CALCIUM SPEC-SCNC: 8.8 MG/DL (ref 8.4–10.4)
CHLORIDE SERPL-SCNC: 99 MMOL/L (ref 98–110)
CO2 SERPL-SCNC: 30 MMOL/L (ref 24–31)
CREAT BLD-MCNC: 1.52 MG/DL (ref 0.5–1.4)
GFR SERPL CREATININE-BSD FRML MDRD: 33 ML/MIN/1.73
GLUCOSE BLD-MCNC: 92 MG/DL (ref 70–100)
POTASSIUM BLD-SCNC: 4 MMOL/L (ref 3.5–5.3)
SODIUM BLD-SCNC: 138 MMOL/L (ref 135–145)

## 2018-06-12 PROCEDURE — 36415 COLL VENOUS BLD VENIPUNCTURE: CPT | Performed by: NURSE PRACTITIONER

## 2018-06-12 PROCEDURE — 80048 BASIC METABOLIC PNL TOTAL CA: CPT | Performed by: NURSE PRACTITIONER

## 2018-08-08 ENCOUNTER — LAB REQUISITION (OUTPATIENT)
Dept: LAB | Facility: HOSPITAL | Age: 78
End: 2018-08-08

## 2018-08-08 DIAGNOSIS — Z00.00 ENCOUNTER FOR GENERAL ADULT MEDICAL EXAMINATION WITHOUT ABNORMAL FINDINGS: ICD-10-CM

## 2018-08-08 LAB
ARTICHOKE IGE QN: 120 MG/DL (ref 0–99)
CHOLEST SERPL-MCNC: 201 MG/DL (ref 130–200)
HBA1C MFR BLD: 5.4 %
HDLC SERPL-MCNC: 43 MG/DL
LDLC/HDLC SERPL: 3 {RATIO}
T4 FREE SERPL-MCNC: 0.94 NG/DL (ref 0.78–2.19)
TRIGL SERPL-MCNC: 145 MG/DL (ref 0–149)
TSH SERPL DL<=0.05 MIU/L-ACNC: 1.18 MIU/ML (ref 0.47–4.68)

## 2018-08-08 PROCEDURE — 36415 COLL VENOUS BLD VENIPUNCTURE: CPT | Performed by: INTERNAL MEDICINE

## 2018-08-08 PROCEDURE — 84443 ASSAY THYROID STIM HORMONE: CPT | Performed by: INTERNAL MEDICINE

## 2018-08-08 PROCEDURE — 84439 ASSAY OF FREE THYROXINE: CPT | Performed by: INTERNAL MEDICINE

## 2018-08-08 PROCEDURE — 83036 HEMOGLOBIN GLYCOSYLATED A1C: CPT | Performed by: INTERNAL MEDICINE

## 2018-08-08 PROCEDURE — 80061 LIPID PANEL: CPT | Performed by: INTERNAL MEDICINE

## 2018-08-23 ENCOUNTER — LAB REQUISITION (OUTPATIENT)
Dept: LAB | Facility: HOSPITAL | Age: 78
End: 2018-08-23

## 2018-08-23 DIAGNOSIS — Z00.00 ENCOUNTER FOR GENERAL ADULT MEDICAL EXAMINATION WITHOUT ABNORMAL FINDINGS: ICD-10-CM

## 2018-08-23 LAB
ARTICHOKE IGE QN: 136 MG/DL (ref 0–99)
CHOLEST SERPL-MCNC: 213 MG/DL (ref 130–200)
HBA1C MFR BLD: 5.6 %
HDLC SERPL-MCNC: 40 MG/DL
LDLC/HDLC SERPL: 3.45 {RATIO}
T4 FREE SERPL-MCNC: 1.06 NG/DL (ref 0.78–2.19)
TRIGL SERPL-MCNC: 175 MG/DL (ref 0–149)
TSH SERPL DL<=0.05 MIU/L-ACNC: 1 MIU/ML (ref 0.47–4.68)

## 2018-08-23 PROCEDURE — 36415 COLL VENOUS BLD VENIPUNCTURE: CPT | Performed by: INTERNAL MEDICINE

## 2018-08-23 PROCEDURE — 84443 ASSAY THYROID STIM HORMONE: CPT | Performed by: INTERNAL MEDICINE

## 2018-08-23 PROCEDURE — 83036 HEMOGLOBIN GLYCOSYLATED A1C: CPT | Performed by: INTERNAL MEDICINE

## 2018-08-23 PROCEDURE — 84439 ASSAY OF FREE THYROXINE: CPT | Performed by: INTERNAL MEDICINE

## 2018-08-23 PROCEDURE — 80061 LIPID PANEL: CPT | Performed by: INTERNAL MEDICINE

## 2018-09-05 ENCOUNTER — LAB REQUISITION (OUTPATIENT)
Dept: LAB | Facility: HOSPITAL | Age: 78
End: 2018-09-05

## 2018-09-05 DIAGNOSIS — Z00.00 ENCOUNTER FOR GENERAL ADULT MEDICAL EXAMINATION WITHOUT ABNORMAL FINDINGS: ICD-10-CM

## 2018-09-05 LAB
ALBUMIN SERPL-MCNC: 3.2 G/DL (ref 3.5–5)
ALBUMIN/GLOB SERPL: 1 G/DL (ref 1.1–2.5)
ALP SERPL-CCNC: 110 U/L (ref 24–120)
ALT SERPL W P-5'-P-CCNC: 25 U/L (ref 0–54)
ANION GAP SERPL CALCULATED.3IONS-SCNC: 10 MMOL/L (ref 4–13)
AST SERPL-CCNC: 22 U/L (ref 7–45)
BASOPHILS # BLD AUTO: 0.04 10*3/MM3 (ref 0–0.2)
BASOPHILS NFR BLD AUTO: 0.5 % (ref 0–2)
BILIRUB SERPL-MCNC: 0.3 MG/DL (ref 0.1–1)
BUN BLD-MCNC: 35 MG/DL (ref 5–21)
BUN/CREAT SERPL: 23.6 (ref 7–25)
CALCIUM SPEC-SCNC: 8.9 MG/DL (ref 8.4–10.4)
CHLORIDE SERPL-SCNC: 105 MMOL/L (ref 98–110)
CO2 SERPL-SCNC: 28 MMOL/L (ref 24–31)
CREAT BLD-MCNC: 1.48 MG/DL (ref 0.5–1.4)
DEPRECATED RDW RBC AUTO: 45.3 FL (ref 40–54)
EOSINOPHIL # BLD AUTO: 0.07 10*3/MM3 (ref 0–0.7)
EOSINOPHIL NFR BLD AUTO: 0.9 % (ref 0–4)
ERYTHROCYTE [DISTWIDTH] IN BLOOD BY AUTOMATED COUNT: 13.9 % (ref 12–15)
GFR SERPL CREATININE-BSD FRML MDRD: 34 ML/MIN/1.73
GLOBULIN UR ELPH-MCNC: 3.3 GM/DL
GLUCOSE BLD-MCNC: 81 MG/DL (ref 70–100)
HCT VFR BLD AUTO: 32.1 % (ref 37–47)
HGB BLD-MCNC: 10.2 G/DL (ref 12–16)
IMM GRANULOCYTES # BLD: 0.03 10*3/MM3 (ref 0–0.03)
IMM GRANULOCYTES NFR BLD: 0.4 % (ref 0–5)
LYMPHOCYTES # BLD AUTO: 2.25 10*3/MM3 (ref 0.72–4.86)
LYMPHOCYTES NFR BLD AUTO: 29.8 % (ref 15–45)
MAGNESIUM SERPL-MCNC: 2.2 MG/DL (ref 1.4–2.2)
MCH RBC QN AUTO: 28.6 PG (ref 28–32)
MCHC RBC AUTO-ENTMCNC: 31.8 G/DL (ref 33–36)
MCV RBC AUTO: 89.9 FL (ref 82–98)
MONOCYTES # BLD AUTO: 0.61 10*3/MM3 (ref 0.19–1.3)
MONOCYTES NFR BLD AUTO: 8.1 % (ref 4–12)
NEUTROPHILS # BLD AUTO: 4.56 10*3/MM3 (ref 1.87–8.4)
NEUTROPHILS NFR BLD AUTO: 60.3 % (ref 39–78)
NRBC BLD MANUAL-RTO: 0 /100 WBC (ref 0–0)
PLATELET # BLD AUTO: 322 10*3/MM3 (ref 130–400)
PMV BLD AUTO: 11.1 FL (ref 6–12)
POTASSIUM BLD-SCNC: 4.7 MMOL/L (ref 3.5–5.3)
PROT SERPL-MCNC: 6.5 G/DL (ref 6.3–8.7)
RBC # BLD AUTO: 3.57 10*6/MM3 (ref 4.2–5.4)
SODIUM BLD-SCNC: 143 MMOL/L (ref 135–145)
WBC NRBC COR # BLD: 7.56 10*3/MM3 (ref 4.8–10.8)

## 2018-09-05 PROCEDURE — 85025 COMPLETE CBC W/AUTO DIFF WBC: CPT | Performed by: INTERNAL MEDICINE

## 2018-09-05 PROCEDURE — 83735 ASSAY OF MAGNESIUM: CPT | Performed by: INTERNAL MEDICINE

## 2018-09-05 PROCEDURE — 36415 COLL VENOUS BLD VENIPUNCTURE: CPT | Performed by: INTERNAL MEDICINE

## 2018-09-05 PROCEDURE — 80053 COMPREHEN METABOLIC PANEL: CPT | Performed by: INTERNAL MEDICINE

## 2018-11-13 ENCOUNTER — LAB REQUISITION (OUTPATIENT)
Dept: LAB | Facility: HOSPITAL | Age: 78
End: 2018-11-13

## 2018-11-13 DIAGNOSIS — Z00.00 ENCOUNTER FOR GENERAL ADULT MEDICAL EXAMINATION WITHOUT ABNORMAL FINDINGS: ICD-10-CM

## 2018-11-13 LAB — HBA1C MFR BLD: 5.6 %

## 2018-11-13 PROCEDURE — 83036 HEMOGLOBIN GLYCOSYLATED A1C: CPT | Performed by: INTERNAL MEDICINE

## 2018-11-13 PROCEDURE — 36415 COLL VENOUS BLD VENIPUNCTURE: CPT | Performed by: INTERNAL MEDICINE

## 2018-12-10 ENCOUNTER — LAB REQUISITION (OUTPATIENT)
Dept: LAB | Facility: HOSPITAL | Age: 78
End: 2018-12-10

## 2018-12-10 DIAGNOSIS — Z00.00 ENCOUNTER FOR GENERAL ADULT MEDICAL EXAMINATION WITHOUT ABNORMAL FINDINGS: ICD-10-CM

## 2018-12-10 LAB
ANION GAP SERPL CALCULATED.3IONS-SCNC: 13 MMOL/L (ref 4–13)
BUN BLD-MCNC: 39 MG/DL (ref 5–21)
BUN/CREAT SERPL: 26.2 (ref 7–25)
CALCIUM SPEC-SCNC: 8.8 MG/DL (ref 8.4–10.4)
CHLORIDE SERPL-SCNC: 97 MMOL/L (ref 98–110)
CO2 SERPL-SCNC: 28 MMOL/L (ref 24–31)
CREAT BLD-MCNC: 1.49 MG/DL (ref 0.5–1.4)
GFR SERPL CREATININE-BSD FRML MDRD: 34 ML/MIN/1.73
GLUCOSE BLD-MCNC: 86 MG/DL (ref 70–100)
POTASSIUM BLD-SCNC: 4.5 MMOL/L (ref 3.5–5.3)
SODIUM BLD-SCNC: 138 MMOL/L (ref 135–145)

## 2018-12-10 PROCEDURE — 80048 BASIC METABOLIC PNL TOTAL CA: CPT | Performed by: NURSE PRACTITIONER

## 2018-12-10 PROCEDURE — 36415 COLL VENOUS BLD VENIPUNCTURE: CPT | Performed by: NURSE PRACTITIONER

## 2018-12-12 ENCOUNTER — LAB REQUISITION (OUTPATIENT)
Dept: LAB | Facility: HOSPITAL | Age: 78
End: 2018-12-12

## 2018-12-12 DIAGNOSIS — Z00.00 ENCOUNTER FOR GENERAL ADULT MEDICAL EXAMINATION WITHOUT ABNORMAL FINDINGS: ICD-10-CM

## 2018-12-12 LAB
ALBUMIN SERPL-MCNC: 3.4 G/DL (ref 3.5–5)
ALBUMIN/GLOB SERPL: 1 G/DL (ref 1.1–2.5)
ALP SERPL-CCNC: 96 U/L (ref 24–120)
ALT SERPL W P-5'-P-CCNC: 33 U/L (ref 0–54)
ANION GAP SERPL CALCULATED.3IONS-SCNC: 11 MMOL/L (ref 4–13)
AST SERPL-CCNC: 37 U/L (ref 7–45)
BASOPHILS # BLD AUTO: 0.03 10*3/MM3 (ref 0–0.2)
BASOPHILS NFR BLD AUTO: 0.7 % (ref 0–2)
BILIRUB SERPL-MCNC: 0.4 MG/DL (ref 0.1–1)
BUN BLD-MCNC: 33 MG/DL (ref 5–21)
BUN/CREAT SERPL: 26.4 (ref 7–25)
CALCIUM SPEC-SCNC: 8.9 MG/DL (ref 8.4–10.4)
CHLORIDE SERPL-SCNC: 101 MMOL/L (ref 98–110)
CO2 SERPL-SCNC: 30 MMOL/L (ref 24–31)
CREAT BLD-MCNC: 1.25 MG/DL (ref 0.5–1.4)
DEPRECATED RDW RBC AUTO: 47.6 FL (ref 40–54)
EOSINOPHIL # BLD AUTO: 0.05 10*3/MM3 (ref 0–0.7)
EOSINOPHIL NFR BLD AUTO: 1.2 % (ref 0–4)
ERYTHROCYTE [DISTWIDTH] IN BLOOD BY AUTOMATED COUNT: 14.5 % (ref 12–15)
GFR SERPL CREATININE-BSD FRML MDRD: 42 ML/MIN/1.73
GLOBULIN UR ELPH-MCNC: 3.3 GM/DL
GLUCOSE BLD-MCNC: 82 MG/DL (ref 70–100)
HCT VFR BLD AUTO: 34.9 % (ref 37–47)
HGB BLD-MCNC: 11 G/DL (ref 12–16)
IMM GRANULOCYTES # BLD: 0.01 10*3/MM3 (ref 0–0.03)
IMM GRANULOCYTES NFR BLD: 0.2 % (ref 0–5)
LYMPHOCYTES # BLD AUTO: 1.85 10*3/MM3 (ref 0.72–4.86)
LYMPHOCYTES NFR BLD AUTO: 43.8 % (ref 15–45)
MAGNESIUM SERPL-MCNC: 2.2 MG/DL (ref 1.4–2.2)
MCH RBC QN AUTO: 28.3 PG (ref 28–32)
MCHC RBC AUTO-ENTMCNC: 31.5 G/DL (ref 33–36)
MCV RBC AUTO: 89.7 FL (ref 82–98)
MONOCYTES # BLD AUTO: 0.47 10*3/MM3 (ref 0.19–1.3)
MONOCYTES NFR BLD AUTO: 11.1 % (ref 4–12)
NEUTROPHILS # BLD AUTO: 1.81 10*3/MM3 (ref 1.87–8.4)
NEUTROPHILS NFR BLD AUTO: 43 % (ref 39–78)
NRBC BLD MANUAL-RTO: 0 /100 WBC (ref 0–0)
PLATELET # BLD AUTO: 191 10*3/MM3 (ref 130–400)
PMV BLD AUTO: 11.7 FL (ref 6–12)
POTASSIUM BLD-SCNC: 4.7 MMOL/L (ref 3.5–5.3)
PROT SERPL-MCNC: 6.7 G/DL (ref 6.3–8.7)
RBC # BLD AUTO: 3.89 10*6/MM3 (ref 4.2–5.4)
SODIUM BLD-SCNC: 142 MMOL/L (ref 135–145)
WBC NRBC COR # BLD: 4.22 10*3/MM3 (ref 4.8–10.8)

## 2018-12-12 PROCEDURE — 36415 COLL VENOUS BLD VENIPUNCTURE: CPT | Performed by: INTERNAL MEDICINE

## 2018-12-12 PROCEDURE — 80053 COMPREHEN METABOLIC PANEL: CPT | Performed by: INTERNAL MEDICINE

## 2018-12-12 PROCEDURE — 83735 ASSAY OF MAGNESIUM: CPT | Performed by: INTERNAL MEDICINE

## 2018-12-12 PROCEDURE — 85025 COMPLETE CBC W/AUTO DIFF WBC: CPT | Performed by: INTERNAL MEDICINE

## 2019-02-06 ENCOUNTER — LAB REQUISITION (OUTPATIENT)
Dept: LAB | Facility: HOSPITAL | Age: 79
End: 2019-02-06

## 2019-02-06 DIAGNOSIS — Z00.00 ENCOUNTER FOR GENERAL ADULT MEDICAL EXAMINATION WITHOUT ABNORMAL FINDINGS: ICD-10-CM

## 2019-02-06 LAB
ARTICHOKE IGE QN: 136 MG/DL (ref 0–99)
CHOLEST SERPL-MCNC: 195 MG/DL (ref 130–200)
HBA1C MFR BLD: 5.8 %
HDLC SERPL-MCNC: 37 MG/DL
LDLC/HDLC SERPL: 3.42 {RATIO}
T4 FREE SERPL-MCNC: 1.09 NG/DL (ref 0.78–2.19)
TRIGL SERPL-MCNC: 157 MG/DL (ref 0–149)
TSH SERPL DL<=0.05 MIU/L-ACNC: 1.17 MIU/ML (ref 0.47–4.68)

## 2019-02-06 PROCEDURE — 83036 HEMOGLOBIN GLYCOSYLATED A1C: CPT | Performed by: INTERNAL MEDICINE

## 2019-02-06 PROCEDURE — 80061 LIPID PANEL: CPT | Performed by: INTERNAL MEDICINE

## 2019-02-06 PROCEDURE — 84439 ASSAY OF FREE THYROXINE: CPT | Performed by: INTERNAL MEDICINE

## 2019-02-06 PROCEDURE — 84443 ASSAY THYROID STIM HORMONE: CPT | Performed by: INTERNAL MEDICINE

## 2019-02-06 PROCEDURE — 36415 COLL VENOUS BLD VENIPUNCTURE: CPT | Performed by: INTERNAL MEDICINE

## 2019-03-06 ENCOUNTER — LAB REQUISITION (OUTPATIENT)
Dept: LAB | Facility: HOSPITAL | Age: 79
End: 2019-03-06

## 2019-03-06 DIAGNOSIS — Z00.00 ENCOUNTER FOR GENERAL ADULT MEDICAL EXAMINATION WITHOUT ABNORMAL FINDINGS: ICD-10-CM

## 2019-03-06 LAB
ALBUMIN SERPL-MCNC: 3.3 G/DL (ref 3.5–5)
ALBUMIN/GLOB SERPL: 1.1 G/DL (ref 1.1–2.5)
ALP SERPL-CCNC: 84 U/L (ref 24–120)
ALT SERPL W P-5'-P-CCNC: 25 U/L (ref 0–54)
ANION GAP SERPL CALCULATED.3IONS-SCNC: 6 MMOL/L (ref 4–13)
AST SERPL-CCNC: 20 U/L (ref 7–45)
BASOPHILS # BLD AUTO: 0.05 10*3/MM3 (ref 0–0.2)
BASOPHILS NFR BLD AUTO: 0.8 % (ref 0–2)
BILIRUB SERPL-MCNC: 0.5 MG/DL (ref 0.1–1)
BUN BLD-MCNC: 26 MG/DL (ref 5–21)
BUN/CREAT SERPL: 23.9 (ref 7–25)
CALCIUM SPEC-SCNC: 9 MG/DL (ref 8.4–10.4)
CHLORIDE SERPL-SCNC: 102 MMOL/L (ref 98–110)
CO2 SERPL-SCNC: 30 MMOL/L (ref 24–31)
CREAT BLD-MCNC: 1.09 MG/DL (ref 0.5–1.4)
DEPRECATED RDW RBC AUTO: 44.7 FL (ref 40–54)
EOSINOPHIL # BLD AUTO: 0.13 10*3/MM3 (ref 0–0.7)
EOSINOPHIL NFR BLD AUTO: 2.1 % (ref 0–4)
ERYTHROCYTE [DISTWIDTH] IN BLOOD BY AUTOMATED COUNT: 14.1 % (ref 12–15)
GFR SERPL CREATININE-BSD FRML MDRD: 49 ML/MIN/1.73
GLOBULIN UR ELPH-MCNC: 2.9 GM/DL
GLUCOSE BLD-MCNC: 82 MG/DL (ref 70–100)
HCT VFR BLD AUTO: 33.5 % (ref 37–47)
HGB BLD-MCNC: 10.7 G/DL (ref 12–16)
IMM GRANULOCYTES # BLD AUTO: 0.02 10*3/MM3 (ref 0–0.05)
IMM GRANULOCYTES NFR BLD AUTO: 0.3 % (ref 0–5)
LYMPHOCYTES # BLD AUTO: 2.36 10*3/MM3 (ref 0.72–4.86)
LYMPHOCYTES NFR BLD AUTO: 38.5 % (ref 15–45)
MAGNESIUM SERPL-MCNC: 2 MG/DL (ref 1.4–2.2)
MCH RBC QN AUTO: 27.8 PG (ref 28–32)
MCHC RBC AUTO-ENTMCNC: 31.9 G/DL (ref 33–36)
MCV RBC AUTO: 87 FL (ref 82–98)
MONOCYTES # BLD AUTO: 0.47 10*3/MM3 (ref 0.19–1.3)
MONOCYTES NFR BLD AUTO: 7.7 % (ref 4–12)
NEUTROPHILS # BLD AUTO: 3.1 10*3/MM3 (ref 1.87–8.4)
NEUTROPHILS NFR BLD AUTO: 50.6 % (ref 39–78)
NRBC BLD AUTO-RTO: 0 /100 WBC (ref 0–0)
PLATELET # BLD AUTO: 220 10*3/MM3 (ref 130–400)
PMV BLD AUTO: 11.6 FL (ref 6–12)
POTASSIUM BLD-SCNC: 4 MMOL/L (ref 3.5–5.3)
PROT SERPL-MCNC: 6.2 G/DL (ref 6.3–8.7)
RBC # BLD AUTO: 3.85 10*6/MM3 (ref 4.2–5.4)
SODIUM BLD-SCNC: 138 MMOL/L (ref 135–145)
WBC NRBC COR # BLD: 6.13 10*3/MM3 (ref 4.8–10.8)

## 2019-03-06 PROCEDURE — 83735 ASSAY OF MAGNESIUM: CPT | Performed by: INTERNAL MEDICINE

## 2019-03-06 PROCEDURE — 36415 COLL VENOUS BLD VENIPUNCTURE: CPT | Performed by: INTERNAL MEDICINE

## 2019-03-06 PROCEDURE — 80053 COMPREHEN METABOLIC PANEL: CPT | Performed by: INTERNAL MEDICINE

## 2019-03-06 PROCEDURE — 85025 COMPLETE CBC W/AUTO DIFF WBC: CPT | Performed by: INTERNAL MEDICINE

## 2019-04-17 ENCOUNTER — LAB REQUISITION (OUTPATIENT)
Dept: LAB | Facility: HOSPITAL | Age: 79
End: 2019-04-17

## 2019-04-17 DIAGNOSIS — Z00.00 ENCOUNTER FOR GENERAL ADULT MEDICAL EXAMINATION WITHOUT ABNORMAL FINDINGS: ICD-10-CM

## 2019-04-17 LAB
ANION GAP SERPL CALCULATED.3IONS-SCNC: 8 MMOL/L (ref 4–13)
BUN BLD-MCNC: 31 MG/DL (ref 5–21)
BUN/CREAT SERPL: 23.7 (ref 7–25)
CALCIUM SPEC-SCNC: 9.1 MG/DL (ref 8.4–10.4)
CHLORIDE SERPL-SCNC: 102 MMOL/L (ref 98–110)
CO2 SERPL-SCNC: 28 MMOL/L (ref 24–31)
CREAT BLD-MCNC: 1.31 MG/DL (ref 0.5–1.4)
GFR SERPL CREATININE-BSD FRML MDRD: 39 ML/MIN/1.73
GLUCOSE BLD-MCNC: 76 MG/DL (ref 70–100)
POTASSIUM BLD-SCNC: 4.4 MMOL/L (ref 3.5–5.3)
SODIUM BLD-SCNC: 138 MMOL/L (ref 135–145)

## 2019-04-17 PROCEDURE — 36415 COLL VENOUS BLD VENIPUNCTURE: CPT | Performed by: INTERNAL MEDICINE

## 2019-04-17 PROCEDURE — 80048 BASIC METABOLIC PNL TOTAL CA: CPT | Performed by: INTERNAL MEDICINE

## 2019-08-13 ENCOUNTER — LAB REQUISITION (OUTPATIENT)
Dept: LAB | Facility: HOSPITAL | Age: 79
End: 2019-08-13

## 2019-08-13 DIAGNOSIS — Z00.00 ENCOUNTER FOR GENERAL ADULT MEDICAL EXAMINATION WITHOUT ABNORMAL FINDINGS: ICD-10-CM

## 2019-08-13 LAB
ANION GAP SERPL CALCULATED.3IONS-SCNC: 8 MMOL/L (ref 4–13)
BUN BLD-MCNC: 27 MG/DL (ref 5–21)
BUN/CREAT SERPL: 21.3 (ref 7–25)
CALCIUM SPEC-SCNC: 9 MG/DL (ref 8.4–10.4)
CHLORIDE SERPL-SCNC: 103 MMOL/L (ref 98–110)
CO2 SERPL-SCNC: 29 MMOL/L (ref 24–31)
CREAT BLD-MCNC: 1.27 MG/DL (ref 0.5–1.4)
GFR SERPL CREATININE-BSD FRML MDRD: 41 ML/MIN/1.73
GLUCOSE BLD-MCNC: 79 MG/DL (ref 70–100)
POTASSIUM BLD-SCNC: 3.8 MMOL/L (ref 3.5–5.3)
SODIUM BLD-SCNC: 140 MMOL/L (ref 135–145)

## 2019-08-13 PROCEDURE — 36415 COLL VENOUS BLD VENIPUNCTURE: CPT | Performed by: INTERNAL MEDICINE

## 2019-08-13 PROCEDURE — 80048 BASIC METABOLIC PNL TOTAL CA: CPT | Performed by: INTERNAL MEDICINE

## 2019-11-04 ENCOUNTER — LAB REQUISITION (OUTPATIENT)
Dept: LAB | Facility: HOSPITAL | Age: 79
End: 2019-11-04

## 2019-11-04 DIAGNOSIS — Z00.00 ENCOUNTER FOR GENERAL ADULT MEDICAL EXAMINATION WITHOUT ABNORMAL FINDINGS: ICD-10-CM

## 2019-11-04 LAB
BASOPHILS # BLD AUTO: 0.05 10*3/MM3 (ref 0–0.2)
BASOPHILS NFR BLD AUTO: 0.9 % (ref 0–1.5)
DEPRECATED RDW RBC AUTO: 46.3 FL (ref 37–54)
EOSINOPHIL # BLD AUTO: 0.09 10*3/MM3 (ref 0–0.4)
EOSINOPHIL NFR BLD AUTO: 1.6 % (ref 0.3–6.2)
ERYTHROCYTE [DISTWIDTH] IN BLOOD BY AUTOMATED COUNT: 13.6 % (ref 12.3–15.4)
HCT VFR BLD AUTO: 37.7 % (ref 34–46.6)
HGB BLD-MCNC: 12.1 G/DL (ref 12–15.9)
IMM GRANULOCYTES # BLD AUTO: 0.01 10*3/MM3 (ref 0–0.05)
IMM GRANULOCYTES NFR BLD AUTO: 0.2 % (ref 0–0.5)
LYMPHOCYTES # BLD AUTO: 2.34 10*3/MM3 (ref 0.7–3.1)
LYMPHOCYTES NFR BLD AUTO: 42.5 % (ref 19.6–45.3)
MCH RBC QN AUTO: 29.2 PG (ref 26.6–33)
MCHC RBC AUTO-ENTMCNC: 32.1 G/DL (ref 31.5–35.7)
MCV RBC AUTO: 91.1 FL (ref 79–97)
MONOCYTES # BLD AUTO: 0.52 10*3/MM3 (ref 0.1–0.9)
MONOCYTES NFR BLD AUTO: 9.5 % (ref 5–12)
NEUTROPHILS # BLD AUTO: 2.49 10*3/MM3 (ref 1.7–7)
NEUTROPHILS NFR BLD AUTO: 45.3 % (ref 42.7–76)
NRBC BLD AUTO-RTO: 0 /100 WBC (ref 0–0.2)
PLATELET # BLD AUTO: 189 10*3/MM3 (ref 140–450)
PMV BLD AUTO: 11.8 FL (ref 6–12)
RBC # BLD AUTO: 4.14 10*6/MM3 (ref 3.77–5.28)
WBC NRBC COR # BLD: 5.5 10*3/MM3 (ref 3.4–10.8)

## 2019-11-04 PROCEDURE — 36415 COLL VENOUS BLD VENIPUNCTURE: CPT | Performed by: INTERNAL MEDICINE

## 2019-11-04 PROCEDURE — 85025 COMPLETE CBC W/AUTO DIFF WBC: CPT | Performed by: INTERNAL MEDICINE

## 2019-12-06 ENCOUNTER — LAB REQUISITION (OUTPATIENT)
Dept: LAB | Facility: HOSPITAL | Age: 79
End: 2019-12-06

## 2019-12-06 DIAGNOSIS — Z00.00 ENCOUNTER FOR GENERAL ADULT MEDICAL EXAMINATION WITHOUT ABNORMAL FINDINGS: ICD-10-CM

## 2019-12-06 LAB
ANION GAP SERPL CALCULATED.3IONS-SCNC: 12 MMOL/L (ref 5–15)
BUN BLD-MCNC: 24 MG/DL (ref 8–23)
BUN/CREAT SERPL: 18.8 (ref 7–25)
CALCIUM SPEC-SCNC: 9.3 MG/DL (ref 8.6–10.5)
CHLORIDE SERPL-SCNC: 101 MMOL/L (ref 98–107)
CO2 SERPL-SCNC: 29 MMOL/L (ref 22–29)
CREAT BLD-MCNC: 1.28 MG/DL (ref 0.57–1)
GFR SERPL CREATININE-BSD FRML MDRD: 40 ML/MIN/1.73
GLUCOSE BLD-MCNC: 89 MG/DL (ref 65–99)
POTASSIUM BLD-SCNC: 4 MMOL/L (ref 3.5–5.2)
SODIUM BLD-SCNC: 142 MMOL/L (ref 136–145)

## 2019-12-06 PROCEDURE — 36415 COLL VENOUS BLD VENIPUNCTURE: CPT | Performed by: INTERNAL MEDICINE

## 2019-12-06 PROCEDURE — 80048 BASIC METABOLIC PNL TOTAL CA: CPT | Performed by: INTERNAL MEDICINE

## 2020-04-03 ENCOUNTER — LAB REQUISITION (OUTPATIENT)
Dept: LAB | Facility: HOSPITAL | Age: 80
End: 2020-04-03

## 2020-04-03 DIAGNOSIS — Z00.00 ENCOUNTER FOR GENERAL ADULT MEDICAL EXAMINATION WITHOUT ABNORMAL FINDINGS: ICD-10-CM

## 2020-04-03 LAB
ANION GAP SERPL CALCULATED.3IONS-SCNC: 13 MMOL/L (ref 5–15)
BUN BLD-MCNC: 29 MG/DL (ref 8–23)
BUN/CREAT SERPL: 19.6 (ref 7–25)
CALCIUM SPEC-SCNC: 9.1 MG/DL (ref 8.6–10.5)
CHLORIDE SERPL-SCNC: 103 MMOL/L (ref 98–107)
CO2 SERPL-SCNC: 27 MMOL/L (ref 22–29)
CREAT BLD-MCNC: 1.48 MG/DL (ref 0.57–1)
GFR SERPL CREATININE-BSD FRML MDRD: 34 ML/MIN/1.73
GLUCOSE BLD-MCNC: 92 MG/DL (ref 65–99)
POTASSIUM BLD-SCNC: 4.3 MMOL/L (ref 3.5–5.2)
SODIUM BLD-SCNC: 143 MMOL/L (ref 136–145)

## 2020-04-03 PROCEDURE — 80048 BASIC METABOLIC PNL TOTAL CA: CPT | Performed by: INTERNAL MEDICINE

## 2020-04-03 PROCEDURE — 36415 COLL VENOUS BLD VENIPUNCTURE: CPT | Performed by: INTERNAL MEDICINE

## 2020-07-30 ENCOUNTER — LAB REQUISITION (OUTPATIENT)
Dept: LAB | Facility: HOSPITAL | Age: 80
End: 2020-07-30

## 2020-07-30 DIAGNOSIS — Z00.00 ENCOUNTER FOR GENERAL ADULT MEDICAL EXAMINATION WITHOUT ABNORMAL FINDINGS: ICD-10-CM

## 2020-07-30 LAB
ANION GAP SERPL CALCULATED.3IONS-SCNC: 11 MMOL/L (ref 5–15)
BUN SERPL-MCNC: 40 MG/DL (ref 8–23)
BUN/CREAT SERPL: 22.9 (ref 7–25)
CALCIUM SPEC-SCNC: 8.9 MG/DL (ref 8.6–10.5)
CHLORIDE SERPL-SCNC: 101 MMOL/L (ref 98–107)
CO2 SERPL-SCNC: 29 MMOL/L (ref 22–29)
CREAT SERPL-MCNC: 1.75 MG/DL (ref 0.57–1)
GFR SERPL CREATININE-BSD FRML MDRD: 28 ML/MIN/1.73
GLUCOSE SERPL-MCNC: 178 MG/DL (ref 65–99)
POTASSIUM SERPL-SCNC: 3.9 MMOL/L (ref 3.5–5.2)
SODIUM SERPL-SCNC: 141 MMOL/L (ref 136–145)

## 2020-07-30 PROCEDURE — 36415 COLL VENOUS BLD VENIPUNCTURE: CPT | Performed by: INTERNAL MEDICINE

## 2020-07-30 PROCEDURE — 80048 BASIC METABOLIC PNL TOTAL CA: CPT | Performed by: INTERNAL MEDICINE

## 2020-11-10 ENCOUNTER — LAB REQUISITION (OUTPATIENT)
Dept: LAB | Facility: HOSPITAL | Age: 80
End: 2020-11-10

## 2020-11-10 DIAGNOSIS — Z00.00 ENCOUNTER FOR GENERAL ADULT MEDICAL EXAMINATION WITHOUT ABNORMAL FINDINGS: ICD-10-CM

## 2020-11-10 LAB
ALBUMIN SERPL-MCNC: 3.6 G/DL (ref 3.5–5.2)
ALBUMIN/GLOB SERPL: 1.2 G/DL
ALP SERPL-CCNC: 81 U/L (ref 39–117)
ALT SERPL W P-5'-P-CCNC: 27 U/L (ref 1–33)
ANION GAP SERPL CALCULATED.3IONS-SCNC: 12 MMOL/L (ref 5–15)
AST SERPL-CCNC: 36 U/L (ref 1–32)
BASOPHILS # BLD AUTO: 0.03 10*3/MM3 (ref 0–0.2)
BASOPHILS NFR BLD AUTO: 0.5 % (ref 0–1.5)
BILIRUB SERPL-MCNC: 0.3 MG/DL (ref 0–1.2)
BUN SERPL-MCNC: 28 MG/DL (ref 8–23)
BUN/CREAT SERPL: 15.2 (ref 7–25)
CALCIUM SPEC-SCNC: 9.2 MG/DL (ref 8.6–10.5)
CHLORIDE SERPL-SCNC: 99 MMOL/L (ref 98–107)
CO2 SERPL-SCNC: 27 MMOL/L (ref 22–29)
CREAT SERPL-MCNC: 1.84 MG/DL (ref 0.57–1)
DEPRECATED RDW RBC AUTO: 46.5 FL (ref 37–54)
EOSINOPHIL # BLD AUTO: 0.01 10*3/MM3 (ref 0–0.4)
EOSINOPHIL NFR BLD AUTO: 0.2 % (ref 0.3–6.2)
ERYTHROCYTE [DISTWIDTH] IN BLOOD BY AUTOMATED COUNT: 14.6 % (ref 12.3–15.4)
GFR SERPL CREATININE-BSD FRML MDRD: 26 ML/MIN/1.73
GLOBULIN UR ELPH-MCNC: 3.1 GM/DL
GLUCOSE SERPL-MCNC: 108 MG/DL (ref 65–99)
HCT VFR BLD AUTO: 37.9 % (ref 34–46.6)
HGB BLD-MCNC: 12.3 G/DL (ref 12–15.9)
IMM GRANULOCYTES # BLD AUTO: 0.01 10*3/MM3 (ref 0–0.05)
IMM GRANULOCYTES NFR BLD AUTO: 0.2 % (ref 0–0.5)
LYMPHOCYTES # BLD AUTO: 2.05 10*3/MM3 (ref 0.7–3.1)
LYMPHOCYTES NFR BLD AUTO: 33.2 % (ref 19.6–45.3)
MCH RBC QN AUTO: 28.7 PG (ref 26.6–33)
MCHC RBC AUTO-ENTMCNC: 32.5 G/DL (ref 31.5–35.7)
MCV RBC AUTO: 88.6 FL (ref 79–97)
MONOCYTES # BLD AUTO: 0.87 10*3/MM3 (ref 0.1–0.9)
MONOCYTES NFR BLD AUTO: 14.1 % (ref 5–12)
NEUTROPHILS NFR BLD AUTO: 3.21 10*3/MM3 (ref 1.7–7)
NEUTROPHILS NFR BLD AUTO: 51.8 % (ref 42.7–76)
NRBC BLD AUTO-RTO: 0 /100 WBC (ref 0–0.2)
PLATELET # BLD AUTO: 187 10*3/MM3 (ref 140–450)
PMV BLD AUTO: 11.7 FL (ref 6–12)
POTASSIUM SERPL-SCNC: 4.6 MMOL/L (ref 3.5–5.2)
PROT SERPL-MCNC: 6.7 G/DL (ref 6–8.5)
RBC # BLD AUTO: 4.28 10*6/MM3 (ref 3.77–5.28)
SODIUM SERPL-SCNC: 138 MMOL/L (ref 136–145)
WBC # BLD AUTO: 6.18 10*3/MM3 (ref 3.4–10.8)

## 2020-11-10 PROCEDURE — 82306 VITAMIN D 25 HYDROXY: CPT | Performed by: NURSE PRACTITIONER

## 2020-11-10 PROCEDURE — 36415 COLL VENOUS BLD VENIPUNCTURE: CPT | Performed by: NURSE PRACTITIONER

## 2020-11-10 PROCEDURE — 85025 COMPLETE CBC W/AUTO DIFF WBC: CPT | Performed by: NURSE PRACTITIONER

## 2020-11-10 PROCEDURE — 80053 COMPREHEN METABOLIC PANEL: CPT | Performed by: NURSE PRACTITIONER

## 2020-11-11 LAB — 25(OH)D3 SERPL-MCNC: 23.4 NG/ML (ref 30–100)

## 2020-12-10 ENCOUNTER — LAB REQUISITION (OUTPATIENT)
Dept: LAB | Facility: HOSPITAL | Age: 80
End: 2020-12-10

## 2020-12-10 DIAGNOSIS — Z00.00 ENCOUNTER FOR GENERAL ADULT MEDICAL EXAMINATION WITHOUT ABNORMAL FINDINGS: ICD-10-CM

## 2020-12-10 LAB
ANION GAP SERPL CALCULATED.3IONS-SCNC: 8 MMOL/L (ref 5–15)
BUN SERPL-MCNC: 33 MG/DL (ref 8–23)
BUN/CREAT SERPL: 27.7 (ref 7–25)
CALCIUM SPEC-SCNC: 9.2 MG/DL (ref 8.6–10.5)
CHLORIDE SERPL-SCNC: 99 MMOL/L (ref 98–107)
CO2 SERPL-SCNC: 30 MMOL/L (ref 22–29)
CREAT SERPL-MCNC: 1.19 MG/DL (ref 0.57–1)
GFR SERPL CREATININE-BSD FRML MDRD: 44 ML/MIN/1.73
GLUCOSE SERPL-MCNC: 88 MG/DL (ref 65–99)
POTASSIUM SERPL-SCNC: 4.5 MMOL/L (ref 3.5–5.2)
SODIUM SERPL-SCNC: 137 MMOL/L (ref 136–145)

## 2020-12-10 PROCEDURE — 36415 COLL VENOUS BLD VENIPUNCTURE: CPT | Performed by: INTERNAL MEDICINE

## 2020-12-10 PROCEDURE — 80048 BASIC METABOLIC PNL TOTAL CA: CPT | Performed by: INTERNAL MEDICINE

## 2021-03-31 ENCOUNTER — LAB REQUISITION (OUTPATIENT)
Dept: LAB | Facility: HOSPITAL | Age: 81
End: 2021-03-31

## 2021-03-31 DIAGNOSIS — Z00.00 ENCOUNTER FOR GENERAL ADULT MEDICAL EXAMINATION WITHOUT ABNORMAL FINDINGS: ICD-10-CM

## 2021-03-31 LAB
25(OH)D3 SERPL-MCNC: 37.6 NG/ML (ref 30–100)
ANION GAP SERPL CALCULATED.3IONS-SCNC: 9 MMOL/L (ref 5–15)
BUN SERPL-MCNC: 39 MG/DL (ref 8–23)
BUN/CREAT SERPL: 23.8 (ref 7–25)
CALCIUM SPEC-SCNC: 9.3 MG/DL (ref 8.6–10.5)
CHLORIDE SERPL-SCNC: 103 MMOL/L (ref 98–107)
CO2 SERPL-SCNC: 32 MMOL/L (ref 22–29)
CREAT SERPL-MCNC: 1.64 MG/DL (ref 0.57–1)
GFR SERPL CREATININE-BSD FRML MDRD: 30 ML/MIN/1.73
GLUCOSE SERPL-MCNC: 139 MG/DL (ref 65–99)
POTASSIUM SERPL-SCNC: 3.9 MMOL/L (ref 3.5–5.2)
SODIUM SERPL-SCNC: 144 MMOL/L (ref 136–145)

## 2021-03-31 PROCEDURE — 82306 VITAMIN D 25 HYDROXY: CPT | Performed by: NURSE PRACTITIONER

## 2021-03-31 PROCEDURE — 80048 BASIC METABOLIC PNL TOTAL CA: CPT | Performed by: NURSE PRACTITIONER

## 2021-03-31 PROCEDURE — 36415 COLL VENOUS BLD VENIPUNCTURE: CPT | Performed by: NURSE PRACTITIONER

## 2021-04-06 ENCOUNTER — LAB REQUISITION (OUTPATIENT)
Dept: LAB | Facility: HOSPITAL | Age: 81
End: 2021-04-06

## 2021-04-06 DIAGNOSIS — Z00.00 ENCOUNTER FOR GENERAL ADULT MEDICAL EXAMINATION WITHOUT ABNORMAL FINDINGS: ICD-10-CM

## 2021-04-06 LAB
ANION GAP SERPL CALCULATED.3IONS-SCNC: 10 MMOL/L (ref 5–15)
BUN SERPL-MCNC: 37 MG/DL (ref 8–23)
BUN/CREAT SERPL: 26.6 (ref 7–25)
CALCIUM SPEC-SCNC: 9.6 MG/DL (ref 8.6–10.5)
CHLORIDE SERPL-SCNC: 99 MMOL/L (ref 98–107)
CO2 SERPL-SCNC: 31 MMOL/L (ref 22–29)
CREAT SERPL-MCNC: 1.39 MG/DL (ref 0.57–1)
GFR SERPL CREATININE-BSD FRML MDRD: 37 ML/MIN/1.73
GLUCOSE SERPL-MCNC: 140 MG/DL (ref 65–99)
POTASSIUM SERPL-SCNC: 4.6 MMOL/L (ref 3.5–5.2)
SODIUM SERPL-SCNC: 140 MMOL/L (ref 136–145)

## 2021-04-06 PROCEDURE — 80048 BASIC METABOLIC PNL TOTAL CA: CPT | Performed by: INTERNAL MEDICINE

## 2021-04-06 PROCEDURE — 36415 COLL VENOUS BLD VENIPUNCTURE: CPT | Performed by: INTERNAL MEDICINE

## 2021-09-03 ENCOUNTER — LAB REQUISITION (OUTPATIENT)
Dept: LAB | Facility: HOSPITAL | Age: 81
End: 2021-09-03

## 2021-09-03 DIAGNOSIS — Z00.00 ENCOUNTER FOR GENERAL ADULT MEDICAL EXAMINATION WITHOUT ABNORMAL FINDINGS: ICD-10-CM

## 2021-09-03 LAB
ANION GAP SERPL CALCULATED.3IONS-SCNC: 9 MMOL/L (ref 5–15)
BUN SERPL-MCNC: 35 MG/DL (ref 8–23)
BUN/CREAT SERPL: 25.9 (ref 7–25)
CALCIUM SPEC-SCNC: 9.7 MG/DL (ref 8.6–10.5)
CHLORIDE SERPL-SCNC: 103 MMOL/L (ref 98–107)
CO2 SERPL-SCNC: 30 MMOL/L (ref 22–29)
CREAT SERPL-MCNC: 1.35 MG/DL (ref 0.57–1)
GFR SERPL CREATININE-BSD FRML MDRD: 38 ML/MIN/1.73
GLUCOSE SERPL-MCNC: 85 MG/DL (ref 65–99)
POTASSIUM SERPL-SCNC: 4.7 MMOL/L (ref 3.5–5.2)
SODIUM SERPL-SCNC: 142 MMOL/L (ref 136–145)

## 2021-09-03 PROCEDURE — 80048 BASIC METABOLIC PNL TOTAL CA: CPT | Performed by: INTERNAL MEDICINE

## 2021-09-03 PROCEDURE — 36415 COLL VENOUS BLD VENIPUNCTURE: CPT | Performed by: INTERNAL MEDICINE

## 2023-01-05 ENCOUNTER — APPOINTMENT (OUTPATIENT)
Dept: CT IMAGING | Facility: HOSPITAL | Age: 83
DRG: 388 | End: 2023-01-05
Payer: MEDICARE

## 2023-01-05 ENCOUNTER — HOSPITAL ENCOUNTER (INPATIENT)
Facility: HOSPITAL | Age: 83
LOS: 11 days | Discharge: NURSING, REHAB OR LONG-TERM CARE | DRG: 388 | End: 2023-01-16
Attending: STUDENT IN AN ORGANIZED HEALTH CARE EDUCATION/TRAINING PROGRAM | Admitting: FAMILY MEDICINE
Payer: MEDICARE

## 2023-01-05 ENCOUNTER — APPOINTMENT (OUTPATIENT)
Dept: GENERAL RADIOLOGY | Facility: HOSPITAL | Age: 83
DRG: 388 | End: 2023-01-05
Payer: MEDICARE

## 2023-01-05 DIAGNOSIS — K56.609 SMALL BOWEL OBSTRUCTION: Primary | ICD-10-CM

## 2023-01-05 DIAGNOSIS — Z78.9 DECREASED ACTIVITIES OF DAILY LIVING (ADL): ICD-10-CM

## 2023-01-05 DIAGNOSIS — R13.10 DYSPHAGIA, UNSPECIFIED TYPE: ICD-10-CM

## 2023-01-05 DIAGNOSIS — Z74.09 IMPAIRED MOBILITY: ICD-10-CM

## 2023-01-05 LAB
ALBUMIN SERPL-MCNC: 4 G/DL (ref 3.5–5.2)
ALBUMIN/GLOB SERPL: 0.8 G/DL
ALP SERPL-CCNC: 84 U/L (ref 39–117)
ALT SERPL W P-5'-P-CCNC: 14 U/L (ref 1–33)
ANION GAP SERPL CALCULATED.3IONS-SCNC: 16 MMOL/L (ref 5–15)
AST SERPL-CCNC: 18 U/L (ref 1–32)
BACTERIA UR QL AUTO: ABNORMAL /HPF
BILIRUB SERPL-MCNC: 0.4 MG/DL (ref 0–1.2)
BILIRUB UR QL STRIP: ABNORMAL
BUN SERPL-MCNC: 83 MG/DL (ref 8–23)
BUN/CREAT SERPL: 24.4 (ref 7–25)
BURR CELLS BLD QL SMEAR: ABNORMAL
CALCIUM SPEC-SCNC: 11.2 MG/DL (ref 8.6–10.5)
CHLORIDE SERPL-SCNC: 96 MMOL/L (ref 98–107)
CLARITY UR: ABNORMAL
CLUMPED PLATELETS: PRESENT
CO2 SERPL-SCNC: 30 MMOL/L (ref 22–29)
COLOR UR: ABNORMAL
CREAT SERPL-MCNC: 3.4 MG/DL (ref 0.57–1)
DEPRECATED RDW RBC AUTO: 50 FL (ref 37–54)
EGFRCR SERPLBLD CKD-EPI 2021: 13.1 ML/MIN/1.73
ERYTHROCYTE [DISTWIDTH] IN BLOOD BY AUTOMATED COUNT: 15.3 % (ref 12.3–15.4)
GLOBULIN UR ELPH-MCNC: 4.9 GM/DL
GLUCOSE SERPL-MCNC: 170 MG/DL (ref 65–99)
GLUCOSE UR STRIP-MCNC: NEGATIVE MG/DL
HCT VFR BLD AUTO: 49.1 % (ref 34–46.6)
HGB BLD-MCNC: 14.9 G/DL (ref 12–15.9)
HGB UR QL STRIP.AUTO: NEGATIVE
HYALINE CASTS UR QL AUTO: ABNORMAL /LPF
KETONES UR QL STRIP: ABNORMAL
LEUKOCYTE ESTERASE UR QL STRIP.AUTO: ABNORMAL
LIPASE SERPL-CCNC: 16 U/L (ref 13–60)
LYMPHOCYTES # BLD MANUAL: 0.65 10*3/MM3 (ref 0.7–3.1)
LYMPHOCYTES NFR BLD MANUAL: 3 % (ref 5–12)
MCH RBC QN AUTO: 26.8 PG (ref 26.6–33)
MCHC RBC AUTO-ENTMCNC: 30.3 G/DL (ref 31.5–35.7)
MCV RBC AUTO: 88.3 FL (ref 79–97)
MONOCYTES # BLD: 0.24 10*3/MM3 (ref 0.1–0.9)
NEUTROPHILS # BLD AUTO: 7.2 10*3/MM3 (ref 1.7–7)
NEUTROPHILS NFR BLD MANUAL: 29 % (ref 42.7–76)
NEUTS BAND NFR BLD MANUAL: 60 % (ref 0–5)
NITRITE UR QL STRIP: NEGATIVE
PH UR STRIP.AUTO: <=5 [PH] (ref 5–8)
PLATELET # BLD AUTO: 422 10*3/MM3 (ref 140–450)
PMV BLD AUTO: 11.1 FL (ref 6–12)
POIKILOCYTOSIS BLD QL SMEAR: ABNORMAL
POLYCHROMASIA BLD QL SMEAR: ABNORMAL
POTASSIUM SERPL-SCNC: 5.7 MMOL/L (ref 3.5–5.2)
PROT SERPL-MCNC: 8.9 G/DL (ref 6–8.5)
PROT UR QL STRIP: ABNORMAL
RBC # BLD AUTO: 5.56 10*6/MM3 (ref 3.77–5.28)
RBC # UR STRIP: ABNORMAL /HPF
REF LAB TEST METHOD: ABNORMAL
SODIUM SERPL-SCNC: 142 MMOL/L (ref 136–145)
SP GR UR STRIP: 1.02 (ref 1–1.03)
SQUAMOUS #/AREA URNS HPF: ABNORMAL /HPF
UROBILINOGEN UR QL STRIP: ABNORMAL
VARIANT LYMPHS NFR BLD MANUAL: 3 % (ref 19.6–45.3)
VARIANT LYMPHS NFR BLD MANUAL: 5 % (ref 0–5)
WBC # UR STRIP: ABNORMAL /HPF
WBC MORPH BLD: NORMAL
WBC NRBC COR # BLD: 8.09 10*3/MM3 (ref 3.4–10.8)

## 2023-01-05 PROCEDURE — 71045 X-RAY EXAM CHEST 1 VIEW: CPT

## 2023-01-05 PROCEDURE — 36415 COLL VENOUS BLD VENIPUNCTURE: CPT

## 2023-01-05 PROCEDURE — 85007 BL SMEAR W/DIFF WBC COUNT: CPT | Performed by: STUDENT IN AN ORGANIZED HEALTH CARE EDUCATION/TRAINING PROGRAM

## 2023-01-05 PROCEDURE — 74018 RADEX ABDOMEN 1 VIEW: CPT

## 2023-01-05 PROCEDURE — 99285 EMERGENCY DEPT VISIT HI MDM: CPT

## 2023-01-05 PROCEDURE — 80053 COMPREHEN METABOLIC PANEL: CPT | Performed by: STUDENT IN AN ORGANIZED HEALTH CARE EDUCATION/TRAINING PROGRAM

## 2023-01-05 PROCEDURE — 25010000002 CEFTRIAXONE PER 250 MG: Performed by: STUDENT IN AN ORGANIZED HEALTH CARE EDUCATION/TRAINING PROGRAM

## 2023-01-05 PROCEDURE — 85025 COMPLETE CBC W/AUTO DIFF WBC: CPT | Performed by: STUDENT IN AN ORGANIZED HEALTH CARE EDUCATION/TRAINING PROGRAM

## 2023-01-05 PROCEDURE — 87040 BLOOD CULTURE FOR BACTERIA: CPT | Performed by: STUDENT IN AN ORGANIZED HEALTH CARE EDUCATION/TRAINING PROGRAM

## 2023-01-05 PROCEDURE — 81001 URINALYSIS AUTO W/SCOPE: CPT | Performed by: STUDENT IN AN ORGANIZED HEALTH CARE EDUCATION/TRAINING PROGRAM

## 2023-01-05 PROCEDURE — 83690 ASSAY OF LIPASE: CPT | Performed by: STUDENT IN AN ORGANIZED HEALTH CARE EDUCATION/TRAINING PROGRAM

## 2023-01-05 PROCEDURE — 74176 CT ABD & PELVIS W/O CONTRAST: CPT

## 2023-01-05 PROCEDURE — 51702 INSERT TEMP BLADDER CATH: CPT

## 2023-01-05 RX ADMIN — SODIUM CHLORIDE, POTASSIUM CHLORIDE, SODIUM LACTATE AND CALCIUM CHLORIDE 1000 ML: 600; 310; 30; 20 INJECTION, SOLUTION INTRAVENOUS at 20:10

## 2023-01-05 RX ADMIN — CEFTRIAXONE 1 G: 1 INJECTION, POWDER, FOR SOLUTION INTRAMUSCULAR; INTRAVENOUS at 21:33

## 2023-01-05 NOTE — ED PROVIDER NOTES
Subjective   History of Present Illness   Patient presents due to vomiting.  Has been going on all day in her nursing facility per report.  Her documentation bedside so she is a full code.  Pat surgical history includes appendectomy and hysterectomy.  She is demented which is her reported baseline and does not consistently answer questions.  She says that she needs a tissue.  Does not display abdominal tenderness but does not answer questions regarding this.    Review of Systems   Unable to perform ROS: Dementia       Past Medical History:   Diagnosis Date   • Hyperlipidemia    • Hypertension        Allergies   Allergen Reactions   • Aspirin      Unknown   • Codeine      Unknown     • Motrin [Ibuprofen]      Unknown       Past Surgical History:   Procedure Laterality Date   • APPENDECTOMY     • HYSTERECTOMY     • TONSILLECTOMY     • WRIST SURGERY Right        History reviewed. No pertinent family history.    Social History     Socioeconomic History   • Marital status:    Tobacco Use   • Smoking status: Never   • Smokeless tobacco: Never   Substance and Sexual Activity   • Alcohol use: No   • Drug use: No   • Sexual activity: Defer           Objective   Physical Exam  Vitals reviewed.   Constitutional:       General: She is not in acute distress.  HENT:      Head: Normocephalic and atraumatic.   Eyes:      Extraocular Movements: Extraocular movements intact.      Conjunctiva/sclera: Conjunctivae normal.   Cardiovascular:      Pulses: Normal pulses.      Heart sounds: Normal heart sounds.   Pulmonary:      Effort: Pulmonary effort is normal. No respiratory distress.   Abdominal:      General: Abdomen is flat. There is no distension.      Tenderness: There is no abdominal tenderness.      Comments: High-pitched bowel sounds   Musculoskeletal:      Cervical back: Normal range of motion and neck supple.      Right lower leg: No edema.      Left lower leg: No edema.   Skin:     General: Skin is warm and dry.    Neurological:      General: No focal deficit present.      Mental Status: She is alert. Mental status is at baseline.   Psychiatric:         Behavior: Behavior normal.         Thought Content: Thought content normal.         Procedures           ED Course                                           MDM   Susana Delcid is a 82 y.o. female with PMH above who presents to the Emergency Department with emesis.  Cannot elicit a history regarding flatus or pain.  Had an x-ray that showed ileus appropriate.  CT ordered to evaluate for bowel obstruction given her history of physical.  CT shows bowel obstruction.  Discussed with Dr. Whittington who recommends NG tube and urinalysis.  Pending urinalysis at time of discussion with Dr. Samuels for admission.  Her labs showed an LETITIA which will need medical management.  Fluids were ordered given that she appears dry; this is likely the etiology of her condition.  Wearing oxygen but per report she wears this at baseline; will obtain screening chest x-ray.       CXR unremarkable. Seen by Dr. Samuels in ED. Plan is for admission.    Final diagnosis: small bowel obstruction.    All questions answered. Patient/family was understanding and in agreement with today's assessment and plan. The patient was monitored during their stay in the ED and dispositioned without acute event.    Electronically signed by:  Jason Stubbs MD 1/6/2023 10:48 CST      Note: Dragon medical dictation software was used in the creation of this note.        Final diagnoses:   Small bowel obstruction (HCC)       ED Disposition  ED Disposition     ED Disposition   Decision to Admit    Condition   --    Comment   Level of Care: Med/Surg [1]   Diagnosis: SBO (small bowel obstruction) (HCC) [[id number]]   Admitting Physician: VERENA SAMUELS [[id number]]   Attending Physician: VERENA SAMUELS [[id number]]   Certification: I Certify That Inpatient Hospital Services Are Medically Necessary For Greater Than 2 Midnights                No follow-up provider specified.       Medication List      ASK your doctor about these medications    carvedilol 12.5 MG tablet  Commonly known as: COREG  Ask about: Which instructions should I use?             Jason Stubbs MD  01/06/23 1981

## 2023-01-06 ENCOUNTER — APPOINTMENT (OUTPATIENT)
Dept: GENERAL RADIOLOGY | Facility: HOSPITAL | Age: 83
DRG: 388 | End: 2023-01-06
Payer: MEDICARE

## 2023-01-06 PROBLEM — N17.9 AKI (ACUTE KIDNEY INJURY) (HCC): Status: ACTIVE | Noted: 2023-01-06

## 2023-01-06 PROBLEM — J18.9 LEFT LOWER LOBE PNEUMONIA: Status: ACTIVE | Noted: 2023-01-06

## 2023-01-06 PROBLEM — E87.5 HYPERKALEMIA: Status: ACTIVE | Noted: 2023-01-06

## 2023-01-06 PROBLEM — N18.32 STAGE 3B CHRONIC KIDNEY DISEASE (CKD) (HCC): Status: ACTIVE | Noted: 2023-01-06

## 2023-01-06 PROBLEM — I50.42 CHRONIC COMBINED SYSTOLIC (CONGESTIVE) AND DIASTOLIC (CONGESTIVE) HEART FAILURE (HCC): Status: ACTIVE | Noted: 2023-01-06

## 2023-01-06 LAB
ALBUMIN SERPL-MCNC: 3.7 G/DL (ref 3.5–5.2)
ALBUMIN/GLOB SERPL: 0.9 G/DL
ALP SERPL-CCNC: 82 U/L (ref 39–117)
ALT SERPL W P-5'-P-CCNC: 21 U/L (ref 1–33)
ANION GAP SERPL CALCULATED.3IONS-SCNC: 11 MMOL/L (ref 5–15)
ANION GAP SERPL CALCULATED.3IONS-SCNC: 14 MMOL/L (ref 5–15)
ARTERIAL PATENCY WRIST A: ABNORMAL
AST SERPL-CCNC: 22 U/L (ref 1–32)
ATMOSPHERIC PRESS: 755 MMHG
BASE EXCESS BLDA CALC-SCNC: 5.3 MMOL/L (ref 0–2)
BDY SITE: ABNORMAL
BILIRUB SERPL-MCNC: 0.3 MG/DL (ref 0–1.2)
BODY TEMPERATURE: 37 C
BUN SERPL-MCNC: 100 MG/DL (ref 8–23)
BUN SERPL-MCNC: 96 MG/DL (ref 8–23)
BUN/CREAT SERPL: 24.9 (ref 7–25)
BUN/CREAT SERPL: 25.9 (ref 7–25)
BURR CELLS BLD QL SMEAR: ABNORMAL
CALCIUM SPEC-SCNC: 10.1 MG/DL (ref 8.6–10.5)
CALCIUM SPEC-SCNC: 9.5 MG/DL (ref 8.6–10.5)
CHLORIDE SERPL-SCNC: 94 MMOL/L (ref 98–107)
CHLORIDE SERPL-SCNC: 98 MMOL/L (ref 98–107)
CO2 SERPL-SCNC: 32 MMOL/L (ref 22–29)
CO2 SERPL-SCNC: 33 MMOL/L (ref 22–29)
CREAT SERPL-MCNC: 3.71 MG/DL (ref 0.57–1)
CREAT SERPL-MCNC: 4.01 MG/DL (ref 0.57–1)
DEPRECATED RDW RBC AUTO: 48.9 FL (ref 37–54)
EGFRCR SERPLBLD CKD-EPI 2021: 10.7 ML/MIN/1.73
EGFRCR SERPLBLD CKD-EPI 2021: 11.8 ML/MIN/1.73
ERYTHROCYTE [DISTWIDTH] IN BLOOD BY AUTOMATED COUNT: 15.1 % (ref 12.3–15.4)
GAS FLOW AIRWAY: 15 LPM
GLOBULIN UR ELPH-MCNC: 3.9 GM/DL
GLUCOSE BLDC GLUCOMTR-MCNC: 123 MG/DL (ref 70–130)
GLUCOSE SERPL-MCNC: 151 MG/DL (ref 65–99)
GLUCOSE SERPL-MCNC: 172 MG/DL (ref 65–99)
HCO3 BLDA-SCNC: 32.1 MMOL/L (ref 20–26)
HCT VFR BLD AUTO: 47.1 % (ref 34–46.6)
HGB BLD-MCNC: 14.7 G/DL (ref 12–15.9)
INHALED O2 CONCENTRATION: 100 %
LYMPHOCYTES # BLD MANUAL: 1.16 10*3/MM3 (ref 0.7–3.1)
LYMPHOCYTES NFR BLD MANUAL: 3 % (ref 5–12)
Lab: ABNORMAL
MCH RBC QN AUTO: 27.4 PG (ref 26.6–33)
MCHC RBC AUTO-ENTMCNC: 31.2 G/DL (ref 31.5–35.7)
MCV RBC AUTO: 87.7 FL (ref 79–97)
METAMYELOCYTES NFR BLD MANUAL: 6.1 % (ref 0–0)
MODALITY: ABNORMAL
MONOCYTES # BLD: 0.35 10*3/MM3 (ref 0.1–0.9)
MYELOCYTES NFR BLD MANUAL: 1 % (ref 0–0)
NEUTROPHILS # BLD AUTO: 9.2 10*3/MM3 (ref 1.7–7)
NEUTROPHILS NFR BLD MANUAL: 27.3 % (ref 42.7–76)
NEUTS BAND NFR BLD MANUAL: 52.5 % (ref 0–5)
NEUTS VAC BLD QL SMEAR: ABNORMAL
PCO2 BLDA: 54.6 MM HG (ref 35–45)
PCO2 TEMP ADJ BLD: 54.6 MM HG (ref 35–45)
PH BLDA: 7.38 PH UNITS (ref 7.35–7.45)
PH, TEMP CORRECTED: 7.38 PH UNITS (ref 7.35–7.45)
PLAT MORPH BLD: NORMAL
PLATELET # BLD AUTO: 354 10*3/MM3 (ref 140–450)
PMV BLD AUTO: 11.2 FL (ref 6–12)
PO2 BLDA: 126 MM HG (ref 83–108)
PO2 TEMP ADJ BLD: 126 MM HG (ref 83–108)
POIKILOCYTOSIS BLD QL SMEAR: ABNORMAL
POTASSIUM SERPL-SCNC: 4.4 MMOL/L (ref 3.5–5.2)
POTASSIUM SERPL-SCNC: 5.7 MMOL/L (ref 3.5–5.2)
PROT SERPL-MCNC: 7.6 G/DL (ref 6–8.5)
RBC # BLD AUTO: 5.37 10*6/MM3 (ref 3.77–5.28)
SAO2 % BLDCOA: 99.4 % (ref 94–99)
SODIUM SERPL-SCNC: 140 MMOL/L (ref 136–145)
SODIUM SERPL-SCNC: 142 MMOL/L (ref 136–145)
VARIANT LYMPHS NFR BLD MANUAL: 4 % (ref 19.6–45.3)
VARIANT LYMPHS NFR BLD MANUAL: 6.1 % (ref 0–5)
VENTILATOR MODE: ABNORMAL
WBC NRBC COR # BLD: 11.53 10*3/MM3 (ref 3.4–10.8)

## 2023-01-06 PROCEDURE — 63710000001 INSULIN REGULAR HUMAN PER 5 UNITS

## 2023-01-06 PROCEDURE — 85007 BL SMEAR W/DIFF WBC COUNT: CPT

## 2023-01-06 PROCEDURE — 94799 UNLISTED PULMONARY SVC/PX: CPT

## 2023-01-06 PROCEDURE — 94761 N-INVAS EAR/PLS OXIMETRY MLT: CPT

## 2023-01-06 PROCEDURE — 74018 RADEX ABDOMEN 1 VIEW: CPT

## 2023-01-06 PROCEDURE — 94640 AIRWAY INHALATION TREATMENT: CPT

## 2023-01-06 PROCEDURE — 36600 WITHDRAWAL OF ARTERIAL BLOOD: CPT

## 2023-01-06 PROCEDURE — 82803 BLOOD GASES ANY COMBINATION: CPT

## 2023-01-06 PROCEDURE — 25010000002 CEFTRIAXONE PER 250 MG: Performed by: INTERNAL MEDICINE

## 2023-01-06 PROCEDURE — 82962 GLUCOSE BLOOD TEST: CPT

## 2023-01-06 PROCEDURE — 80053 COMPREHEN METABOLIC PANEL: CPT

## 2023-01-06 PROCEDURE — 25010000002 AZITHROMYCIN PER 500 MG: Performed by: STUDENT IN AN ORGANIZED HEALTH CARE EDUCATION/TRAINING PROGRAM

## 2023-01-06 PROCEDURE — 85025 COMPLETE CBC W/AUTO DIFF WBC: CPT

## 2023-01-06 PROCEDURE — 99222 1ST HOSP IP/OBS MODERATE 55: CPT

## 2023-01-06 PROCEDURE — 99221 1ST HOSP IP/OBS SF/LOW 40: CPT | Performed by: SPECIALIST

## 2023-01-06 RX ORDER — SODIUM CHLORIDE, SODIUM LACTATE, POTASSIUM CHLORIDE, CALCIUM CHLORIDE 600; 310; 30; 20 MG/100ML; MG/100ML; MG/100ML; MG/100ML
75 INJECTION, SOLUTION INTRAVENOUS CONTINUOUS
Status: DISCONTINUED | OUTPATIENT
Start: 2023-01-06 | End: 2023-01-06

## 2023-01-06 RX ORDER — HYDROMORPHONE HYDROCHLORIDE 1 MG/ML
0.25 INJECTION, SOLUTION INTRAMUSCULAR; INTRAVENOUS; SUBCUTANEOUS EVERY 4 HOURS PRN
Status: DISCONTINUED | OUTPATIENT
Start: 2023-01-06 | End: 2023-01-10 | Stop reason: RX

## 2023-01-06 RX ORDER — PAROXETINE 10 MG/1
10 TABLET, FILM COATED ORAL EVERY MORNING
COMMUNITY
End: 2023-01-16 | Stop reason: HOSPADM

## 2023-01-06 RX ORDER — SODIUM CHLORIDE 0.9 % (FLUSH) 0.9 %
10 SYRINGE (ML) INJECTION EVERY 12 HOURS SCHEDULED
Status: DISCONTINUED | OUTPATIENT
Start: 2023-01-06 | End: 2023-01-16 | Stop reason: HOSPADM

## 2023-01-06 RX ORDER — METOPROLOL TARTRATE 5 MG/5ML
5 INJECTION INTRAVENOUS EVERY 6 HOURS SCHEDULED
Status: DISCONTINUED | OUTPATIENT
Start: 2023-01-06 | End: 2023-01-10

## 2023-01-06 RX ORDER — ACETAMINOPHEN 325 MG/1
650 TABLET ORAL EVERY 4 HOURS PRN
Status: DISCONTINUED | OUTPATIENT
Start: 2023-01-06 | End: 2023-01-16 | Stop reason: HOSPADM

## 2023-01-06 RX ORDER — SODIUM CHLORIDE 9 MG/ML
75 INJECTION, SOLUTION INTRAVENOUS CONTINUOUS
Status: DISCONTINUED | OUTPATIENT
Start: 2023-01-06 | End: 2023-01-07

## 2023-01-06 RX ORDER — CARVEDILOL 12.5 MG/1
12.5 TABLET ORAL 2 TIMES DAILY WITH MEALS
COMMUNITY
End: 2023-01-16 | Stop reason: HOSPADM

## 2023-01-06 RX ORDER — FUROSEMIDE 40 MG/1
40 TABLET ORAL DAILY
COMMUNITY
End: 2023-01-16 | Stop reason: HOSPADM

## 2023-01-06 RX ORDER — DEXTROSE MONOHYDRATE 25 G/50ML
50 INJECTION, SOLUTION INTRAVENOUS ONCE
Status: COMPLETED | OUTPATIENT
Start: 2023-01-06 | End: 2023-01-06

## 2023-01-06 RX ORDER — SODIUM CHLORIDE 0.9 % (FLUSH) 0.9 %
10 SYRINGE (ML) INJECTION AS NEEDED
Status: DISCONTINUED | OUTPATIENT
Start: 2023-01-06 | End: 2023-01-16 | Stop reason: HOSPADM

## 2023-01-06 RX ORDER — SODIUM CHLORIDE 9 MG/ML
40 INJECTION, SOLUTION INTRAVENOUS AS NEEDED
Status: DISCONTINUED | OUTPATIENT
Start: 2023-01-06 | End: 2023-01-16 | Stop reason: HOSPADM

## 2023-01-06 RX ORDER — CLONIDINE HYDROCHLORIDE 0.1 MG/1
0.1 TABLET ORAL DAILY PRN
COMMUNITY
End: 2023-01-16 | Stop reason: HOSPADM

## 2023-01-06 RX ORDER — DEXTROSE MONOHYDRATE 25 G/50ML
50 INJECTION, SOLUTION INTRAVENOUS
Status: DISCONTINUED | OUTPATIENT
Start: 2023-01-06 | End: 2023-01-06

## 2023-01-06 RX ORDER — ONDANSETRON 2 MG/ML
4 INJECTION INTRAMUSCULAR; INTRAVENOUS EVERY 6 HOURS PRN
Status: DISCONTINUED | OUTPATIENT
Start: 2023-01-06 | End: 2023-01-16 | Stop reason: HOSPADM

## 2023-01-06 RX ORDER — ARIPIPRAZOLE 5 MG/1
5 TABLET ORAL DAILY
COMMUNITY
End: 2023-01-16 | Stop reason: HOSPADM

## 2023-01-06 RX ADMIN — SODIUM CHLORIDE 75 ML/HR: 9 INJECTION, SOLUTION INTRAVENOUS at 11:11

## 2023-01-06 RX ADMIN — SODIUM CHLORIDE, POTASSIUM CHLORIDE, SODIUM LACTATE AND CALCIUM CHLORIDE 75 ML/HR: 600; 310; 30; 20 INJECTION, SOLUTION INTRAVENOUS at 02:12

## 2023-01-06 RX ADMIN — AZITHROMYCIN MONOHYDRATE 500 MG: 500 INJECTION, POWDER, LYOPHILIZED, FOR SOLUTION INTRAVENOUS at 23:48

## 2023-01-06 RX ADMIN — Medication 10 ML: at 02:11

## 2023-01-06 RX ADMIN — ALBUTEROL SULFATE 1.25 MG: 2.5 SOLUTION RESPIRATORY (INHALATION) at 19:58

## 2023-01-06 RX ADMIN — INSULIN HUMAN 10 UNITS: 100 INJECTION, SOLUTION PARENTERAL at 11:10

## 2023-01-06 RX ADMIN — SODIUM CHLORIDE, POTASSIUM CHLORIDE, SODIUM LACTATE AND CALCIUM CHLORIDE 1000 ML: 600; 310; 30; 20 INJECTION, SOLUTION INTRAVENOUS at 09:34

## 2023-01-06 RX ADMIN — DEXTROSE MONOHYDRATE 50 ML: 25 INJECTION, SOLUTION INTRAVENOUS at 11:10

## 2023-01-06 RX ADMIN — CEFTRIAXONE 1 G: 1 INJECTION, POWDER, FOR SOLUTION INTRAMUSCULAR; INTRAVENOUS at 20:26

## 2023-01-06 RX ADMIN — METOPROLOL TARTRATE 5 MG: 5 INJECTION INTRAVENOUS at 09:35

## 2023-01-06 RX ADMIN — SODIUM CHLORIDE, POTASSIUM CHLORIDE, SODIUM LACTATE AND CALCIUM CHLORIDE 1000 ML: 600; 310; 30; 20 INJECTION, SOLUTION INTRAVENOUS at 22:11

## 2023-01-06 RX ADMIN — Medication 10 ML: at 20:25

## 2023-01-06 RX ADMIN — IPRATROPIUM BROMIDE 0.5 MG: 0.5 SOLUTION RESPIRATORY (INHALATION) at 19:58

## 2023-01-06 RX ADMIN — IPRATROPIUM BROMIDE 0.5 MG: 0.5 SOLUTION RESPIRATORY (INHALATION) at 15:00

## 2023-01-06 RX ADMIN — METOPROLOL TARTRATE 5 MG: 5 INJECTION INTRAVENOUS at 20:25

## 2023-01-06 RX ADMIN — METOPROLOL TARTRATE 5 MG: 5 INJECTION INTRAVENOUS at 23:48

## 2023-01-06 RX ADMIN — IPRATROPIUM BROMIDE 0.5 MG: 0.5 SOLUTION RESPIRATORY (INHALATION) at 06:49

## 2023-01-06 RX ADMIN — AZITHROMYCIN DIHYDRATE 500 MG: 500 INJECTION, POWDER, LYOPHILIZED, FOR SOLUTION INTRAVENOUS at 00:09

## 2023-01-06 RX ADMIN — ALBUTEROL SULFATE 1.25 MG: 2.5 SOLUTION RESPIRATORY (INHALATION) at 06:49

## 2023-01-06 RX ADMIN — ALBUTEROL SULFATE 1.25 MG: 2.5 SOLUTION RESPIRATORY (INHALATION) at 15:00

## 2023-01-06 NOTE — PROGRESS NOTES
[facility] Medicine Services  INPATIENT PROGRESS NOTE    Patient Name: Susana Delcid  Date of Admission: 1/5/2023  Today's Date: 01/06/23  Length of Stay: 1  Primary Care Physician: Provider, No Known    Subjective   Chief Complaint: Vomiting  HPI   Patient lying in bed on 2 L nasal cannula, noticed her oxygen saturation at 100%..  She is not able to verbally discuss things with me.  Noted is her history of dementia.  Based on her medical record at this time.  Spoke with her nurse, Sissy who communicated with the nursing home and the patient appears to be a marquez of the [facility].  She is also a full code.  We will continue medical management at this time.    Review of Systems   All pertinent negatives and positives are as above. All other systems have been reviewed and are negative unless otherwise stated.     Objective    Temp:  [97.4 °F (36.3 °C)-98.7 °F (37.1 °C)] 97.4 °F (36.3 °C)  Heart Rate:  [] 110  Resp:  [16-26] 16  BP: ()/(63-90) 156/84  Physical Exam  Vitals and nursing note reviewed.   Constitutional:       Comments: Drowsy and not able to communicate at this time.   HENT:      Head: Normocephalic and atraumatic.      Mouth/Throat:      Mouth: Mucous membranes are dry.   Eyes:      Pupils: Pupils are equal, round, and reactive to light.   Cardiovascular:      Rate and Rhythm: Normal rate.      Pulses: Normal pulses.      Heart sounds: Murmur heard.     No friction rub.      Comments: Trigeminal  Pulmonary:      Effort: Pulmonary effort is normal. No respiratory distress.      Breath sounds: No stridor. Rhonchi present.   Abdominal:      General: There is distension.      Palpations: Abdomen is soft.      Tenderness: There is no abdominal tenderness. There is no guarding.   Musculoskeletal:         General: No swelling or deformity. Normal range of motion.      Cervical back: Normal range of motion and neck supple. No rigidity or tenderness.      Right lower  leg: No edema.      Left lower leg: No edema.   Skin:     General: Skin is warm and dry.      Capillary Refill: Capillary refill takes less than 2 seconds.      Coloration: Skin is not pale.      Findings: No bruising or erythema.   Neurological:      Mental Status: She is disoriented.      Motor: Weakness present.      Gait: Gait abnormal.      Comments: Patient unable to verbally communicate/interact at this time.       Results Review:  I have reviewed the labs, radiology results, and diagnostic studies.    Laboratory Data:   Results from last 7 days   Lab Units 01/05/23  1720   WBC 10*3/mm3 8.09   HEMOGLOBIN g/dL 14.9   HEMATOCRIT % 49.1*   PLATELETS 10*3/mm3 422        Results from last 7 days   Lab Units 01/05/23  1720   SODIUM mmol/L 142   POTASSIUM mmol/L 5.7*   CHLORIDE mmol/L 96*   CO2 mmol/L 30.0*   BUN mg/dL 83*   CREATININE mg/dL 3.40*   CALCIUM mg/dL 11.2*   BILIRUBIN mg/dL 0.4   ALK PHOS U/L 84   ALT (SGPT) U/L 14   AST (SGOT) U/L 18   GLUCOSE mg/dL 170*       Culture Data:   No results found for: BLOODCX, URINECX, WOUNDCX, MRSACX, RESPCX, STOOLCX    Radiology Data:   Imaging Results (Last 24 Hours)     Procedure Component Value Units Date/Time    XR Abdomen KUB [[id number]] Collected: 01/05/23 2052     Updated: 01/05/23 2056    Narrative:      EXAMINATION: KUB radiograph 01/05/2023     HISTORY: NG tube placement     FINDINGS: An NG tube has been successfully advanced into the proximal  body of the stomach.  This report was finalized on 01/05/2023 20:53 by Dr. Randall Huerta MD.    XR Chest 1 View [[id number]] Collected: 01/05/23 2035     Updated: 01/05/23 2039    Narrative:      EXAMINATION: Chest 1 view 01/05/2023     HISTORY: Hypoxia.     FINDINGS: Upright frontal projection of the chest demonstrates left  lower lobe consolidation with partial silhouetting of the left  diaphragm. There is atheromatous calcification of the thoracic aorta.  Lungs are otherwise clear. A small left effusion is  suspected with  blunting of the lateral costophrenic angle. There is gaseous distention  of the small bowel within the upper abdomen.       Impression:      1.. Left lower lobe consolidation suggesting a infiltrate. A small left  effusion is present. Lungs are otherwise clear.  This report was finalized on 01/05/2023 20:36 by Dr. Randall Huerta MD.    CT Abdomen Pelvis Without Contrast [[id number]] Collected: 01/05/23 1925     Updated: 01/05/23 1944    Narrative:      CT ABDOMEN PELVIS WO CONTRAST- 1/5/2023 6:36 PM CST     HISTORY: eval for SBO      COMPARISON: None      DLP: 487 mGy cm. All CT scans are performed using dose optimization  techniques as appropriate to the performed exam and including at least  one of the following: Automated exposure control, adjustment of the mA  and/or kV according to size, and the use of the iterative reconstruction  technique.     TECHNIQUE: Noncontrast enhanced images of the abdomen and pelvis  obtained without oral contrast.      FINDINGS: RIGHT basilar atelectasis is present. There is more confluent  disease within the left lower lobe suggesting left lower lobe pneumonia  with a small left-sided parapneumonic effusion. There is moderate  cardiomegaly. Heavy coronary calcifications are present. There is no  evidence of pericardial effusion.   There is enlargement of the pulmonary arteries suggesting pulmonary  arterial hypertension..      LIVER: There is colonic interposition beneath the right hemidiaphragm.  The liver is homogeneous in density without evidence of focal hepatic  mass..      BILIARY SYSTEM: There is increased density dependently within the  gallbladder suggesting layering gallstones. No pericholecystic  inflammatory changes are present. Mild extrahepatic biliary dilatation  is present.     PANCREAS: No focal pancreatic lesion.      SPLEEN: Unremarkable.      KIDNEYS AND ADRENALS: There are cortical cysts of both kidneys. There is  a hyperdense lesion involving  the midpole of the right kidney for which  I would favor hemorrhagic cyst. This measures 1.2 cm in size measuring  Hounsfield units of 90. No perinephric fluid collections are present..   The ureters are decompressed and normal in appearance.     RETROPERITONEUM: There is heavy atheromatous calcification of the  abdominal aorta without evidence of aneurysm. There is no evidence of  retroperitoneal hematoma or adenopathy..      GI TRACT: There is a small amount of retained contrast and fecal  material within the colon. No evidence of constipation. There are  diverticula within the descending and sigmoid colon with no evidence of  acute diverticulitis.. The appendix is surgically absent. There is  moderate small bowel distention as well as fluid distention of the  stomach suggesting a mechanical bowel obstruction. The more distal ileum  is decompressed with the apparent transition noted on images 61 through  68 just to the right of midline within the pelvis. This may be on the  basis of adhesions. There is no evidence of pneumatosis. No evidence of  pneumoperitoneum..     OTHER: There is no mesenteric mass, lymphadenopathy or fluid collection.  The osseous structures and soft tissues demonstrate no worrisome  lesions. No free fluid is present.      PELVIS: The patient has undergone prior hysterectomy.. The urinary  bladder is normal in appearance.       Impression:      1. FINDINGS consistent with a mechanical bowel obstruction with moderate  small bowel and gastric distention. The more distal ileum is  decompressed and there appears to be a transition point to the right of  midline in the lower pelvis. This may be on the basis of adhesions.  There is retained contrast and fecal material within the colon. There is  diverticulosis of the descending and sigmoid colon with no evidence of  diverticulitis. No evidence of free air or pneumoperitoneum.  2. Left lower lobe pneumonia with a small left parapneumonic  effusion.  There is cardiomegaly. No pericardial effusion is present. There are  heavy coronary calcifications present..  3. Suspected cholelithiasis. No pericholecystic inflammatory changes  present.  4. Cortical cysts of the kidneys including a suspected hemorrhagic cyst  of the right kidney.        This report was finalized on 01/05/2023 19:41 by Dr. Randall Huerta MD.          I have reviewed the patient's current medications.     Assessment/Plan   Assessment  Active Hospital Problems    Diagnosis    • **SBO (small bowel obstruction) (HCC)    • LETITIA (acute kidney injury) (HCC)    • Stage 3b chronic kidney disease (CKD) (HCC)    • Left lower lobe pneumonia    • Hyperkalemia    • Chronic combined systolic (congestive) and diastolic (congestive) heart failure (HCC)    • Essential hypertension        Treatment Plan  Patient is a 82-year-old female who presents to [facility] emergency department from [facility].  Patient has history of dementia, hyperlipidemia, hypertension.  This list is not at all exclusive as full history is limited at this time with patient being a poor historian. She presents with chief complaint of nausea and vomiting, the patient has abdominal distention, NG tube placed in emergency department.  CT scan abdomen shows small bowel obstruction.  Patient initially tachycardic and hypoxic requiring Vapotherm nasal cannula supplementation.  Creatinine on presentation 3.4, creatinine of 1.35 and September 2021.  GFR on presentation was 13, GFR in September 2021 was 38.    Small bowel obstruction-  NG tube initially placed in stomach and hooked to low intermittent suction.  Patient pulled NG tube out.  Plan to reinstate NG tube.  Dr. Gabriel with general surgery following.  Repeat KUB pending.    Acute kidney injury superimposed on stage 3b chronic kidney disease-  Creatinine 3.4, BUN 83, GFR 13.1 on arrival.  These values are worsened from baseline of stage IIIb chronic  kidney disease.  Creatinine 1.35 and GFR 38 in September 2021.  LR infusion transition to normal saline at 75 mL/h for gentle IV hydration.  Repeat CMP shows potassium 5.7, creatinine 4.01, , GFR 10.7.    Left lower lobe pneumonia-  WBC 8.09, afebrile.  O2 saturation 94% on 3 L nasal cannula.  Azithromycin and Rocephin IV.    Chronic combined systolic and diastolic heart failure-  Echocardiogram from January 2018 shows EF 31 to 35% with diastolic dysfunction.  We will be mindful of this with IV hydration.  We will hold home Lasix for now.  Will restart home Coreg when patient is able to take p.o. we will start Lopressor IV 5 mg every 6 hours for now.    Hyperkalemia-  Potassium 5.7 upon presentation.  Recheck also shows potassium 5.7 after fluid resuscitation.  Will do 10 units regular insulin/D50 50 mL now.    Hypertension-  Restart home Coreg patient is able to take p.o.  Start Lopressor IV 5 mg every 6 hours for now.    Palliative care consulted.    Patient is a marquez of the [facility] and a full code.    Medical Decision Making  Number and Complexity of problems: 2 high complexity problems in acute kidney injury and small bowel obstruction.  3 moderate complexity problems in pneumonia and chronic combined systolic and diastolic heart failure and hyperkalemia.  Differential Diagnosis: None    Conditions and Status  Small bowel obstruction stable  Acute kidney injury worsening  Left lower lobe pneumonia stable  Chronic combined systolic and diastolic heart failure stable  Hyperkalemia stable    MDM Data  External documents reviewed: Echocardiogram from 2018  Cardiac tracing (EKG, telemetry) interpretation: Telemetry shows trigeminy  Radiology interpretation: Reviewed radiologist interpretation of chest x-ray/KUB  Labs reviewed: Arterial blood gas, CMP, CBC with differential, urinalysis  Any tests that were considered but not ordered: Echocardiogram     Decision rules/scores evaluated (example [id number], Wells,  etc): None     Discussed with: Patient, although I do not believe she understands.     Care Planning  Shared decision making: Discussed plan of care with patient in the presence of cognitive disability.  Code status and discussions: Full code, patient is a marquez of the state.    Disposition  Social Determinants of Health that impact treatment or disposition: Patient resides at a skilled nursing facility  Discharge disposition is unclear at this time.     Electronically signed by JAMIR Worthy, 01/06/23, 08:58 CST.

## 2023-01-06 NOTE — ED NOTES
Patient refusing to keep oxygen in place after multiple attempts to redirect resulting in decreased oxygen saturation. MD Samuels called, new order for vapotherm. RT called for this.

## 2023-01-06 NOTE — CASE MANAGEMENT/SOCIAL WORK
Continued Stay Note   Bernadine     Patient Name: Susana Delcid  MRN: [id number]  Today's Date: 1/6/2023    Admit Date: 1/5/2023    Plan: [facility]   Discharge Plan     Row Name 01/06/23 1235       Plan    Plan [facility]    Patient/Family in Agreement with Plan yes    Plan Comments Pt resides at [facility].  WARREN verified with Billie in admissions that pt has a bed hold and can return upon dc.  SW will follow.               Discharge Codes    No documentation.                     GINO Marcus

## 2023-01-06 NOTE — PLAN OF CARE
Goal Outcome Evaluation:  Plan of Care Reviewed With: patient        Progress: no change  Outcome Evaluation: pt admitted to 442 with SBO. pt NPO, keeps requesting milk. spoke to reji, pt is on 3 L and doesnt wear any at o2 at baseline. plan for KUB and gen sx consult today. LR infusing. no c/o pain. safety maintained. pt refusing morning labs, Dr. Samuels notified.

## 2023-01-06 NOTE — PROGRESS NOTES
NG tube in position, does not look like a lot of out put on the NG tube, her abdomen is soft, but still distended, she is not fully oriented cannot say whether or not she is passing gas or having any stool to this point, her KUB is poorly done but it looks like there is some gas in her transverse colon, continue current course, bolus completed today we will check labs and x-ray in the morning.

## 2023-01-06 NOTE — CONSULTS
Patient Care Team:  Provider, No Known as PCP - General    Chief complaint small bowel obstruction    Subjective     Subjective .     History of present illness: Susana Daley is a 82-year-old female who presents from [facility] she is extremely hard of hearing also has some dementia, and by report she was admitted to the internal medicine service for nausea and vomiting.  She is also noted to have abdominal distention and NG tube has not been placed CT scan by report shows a small bowel obstruction, there is some stool in the right colon, unsure when her last bowel movement was and CT scan consistent with a small bowel obstruction.  With the above problems she is admitted General surgery asked to see and evaluate no NG tube is in place at present we will have this replaced restraint patient as needed to let this resolve and once again somewhat demented she does not know the year, also knows where she is but I believe her hearing deficit also contributes to her loss of sensorium.    Past surgical history significant for a lower midline incision, by report she has had an appendectomy and hysterectomy.    Medical problems significant for dementia, hearing loss, hyperlipidemia, hypertension.    She resides at [facility].    Social history non-smoker no alcohol.  Allergies are to aspirin, codeine, Motrin.    Medications are those listed.    Review of Systems  Pertinent items are noted in HPI, all other systems reviewed and negative    History  Past Medical History:   Diagnosis Date   • Hyperlipidemia    • Hypertension    ,   Past Surgical History:   Procedure Laterality Date   • APPENDECTOMY     • HYSTERECTOMY     • TONSILLECTOMY     • WRIST SURGERY Right    , History reviewed. No pertinent family history.,   Social History     Tobacco Use   • Smoking status: Never   • Smokeless tobacco: Never   Substance Use Topics   • Alcohol use: No   • Drug use: No   ,   Medications Prior to Admission    Medication Sig Dispense Refill Last Dose   • acetaminophen (TYLENOL) 325 MG tablet Take 650 mg by mouth Every 6 (Six) Hours As Needed for Mild Pain .   Past Week   • ARIPiprazole (ABILIFY) 5 MG tablet Take 5 mg by mouth Daily.   1/5/2023   • carvedilol (COREG) 12.5 MG tablet Take 12.5 mg by mouth 2 (Two) Times a Day With Meals.   1/5/2023   • cholecalciferol (VITAMIN D3) 1.25 MG (69758 UT) capsule Take 50,000 Units by mouth 1 (One) Time Per Week. One time a day every Tue for supplement   Past Week   • furosemide (LASIX) 40 MG tablet Take 1 tablet by mouth 2 (Two) Times a Day. (Patient taking differently: Take 20 mg by mouth 2 (Two) Times a Day.)   1/5/2023   • hydrALAZINE (APRESOLINE) 50 MG tablet Take 1 tablet by mouth Every 8 (Eight) Hours.   1/5/2023   • isosorbide dinitrate (ISORDIL) 20 MG tablet Take 1 tablet by mouth Every 8 (Eight) Hours.   1/5/2023   • potassium chloride (MICRO-K) 10 MEQ CR capsule Take 4 capsules by mouth Daily.   1/5/2023   • cloNIDine (CATAPRES) 0.1 MG tablet Take 0.1 mg by mouth Daily As Needed for High Blood Pressure. 1 tablet every 24 hours as needed for high BP for SBP greater than 180, DBP greater than 90   Unknown   , Scheduled Meds:  albuterol, 1.25 mg, Nebulization, 4x Daily - RT   And  ipratropium, 0.5 mg, Nebulization, 4x Daily - RT  [START ON 1/7/2023] azithromycin, 500 mg, Intravenous, Q24H  cefTRIAXone, 1 g, Intravenous, Q24H  lactated ringers, 1,000 mL, Intravenous, Once  metoprolol tartrate, 5 mg, Intravenous, Q6H  sodium chloride, 10 mL, Intravenous, Q12H    , Continuous Infusions:  sodium chloride, 75 mL/hr    , PRN Meds:  •  acetaminophen  •  HYDROmorphone  •  ondansetron  •  silver sulfadiazine  •  sodium chloride  •  sodium chloride and Allergies:  Aspirin, Codeine, and Motrin [ibuprofen]    Objective      Objective     Vital Signs   Temp:  [97.4 °F (36.3 °C)-98.7 °F (37.1 °C)] 97.4 °F (36.3 °C)  Heart Rate:  [] 110  Resp:  [16-26] 16  BP: ()/(63-90)  156/84    Intake & Output (last 3 days)     None           Physical Exam:     General Appearance:   She is awake, alert, very hard of hearing, she does know where she is at and also the year, but once again is very hard of hearing unable to discuss any points of her life with her do not know how many children, or what type of work she did.   Head:    Normocephalic, without obvious abnormality, atraumatic   Eyes:            Lids and lashes normal, conjunctivae and sclerae normal, no   icterus, no pallor, corneas clear, PERRLA   Ears:    Ears appear intact with no abnormalities noted   Throat:   No oral lesions, no thrush, oral mucosa moist   Neck:   No adenopathy, supple, trachea midline, no thyromegaly, no   carotid bruit, no JVD   Back:     No kyphosis present, no scoliosis present, no skin lesions,      erythema or scars, no tenderness to percussion or                   palpation,   range of motion normal   Lungs:     Clear to auscultation,respirations regular, even and                  unlabored    Heart:    Regular rhythm and normal rate, normal S1 and S2, no            murmur, no gallop, no rub, no click   Chest Wall:    No abnormalities observed   Abdomen:    Very thin abdominal wall, normal bowel sounds, slightly distended, well-healed lower midline incision, no obvious hernia.  Some tenderness in the lower abdomen slightly more to the right of midline.   Rectal:     Deferred   Extremities:   Moves all extremities well, no edema, no cyanosis, no             redness   Pulses:   Pulses palpable and equal bilaterally   Skin:   No bleeding, bruising or rash   Lymph nodes:   No palpable adenopathy   Neurologic:   Cranial nerves 2 - 12 grossly intact, sensation intact, DTR       present and equal bilaterally        Results from last 7 days   Lab Units 01/05/23  1720   WBC 10*3/mm3 8.09   HEMOGLOBIN g/dL 14.9   HEMATOCRIT % 49.1*   PLATELETS 10*3/mm3 422        Results from last 7 days   Lab Units 01/05/23  1720    SODIUM mmol/L 142   POTASSIUM mmol/L 5.7*   CHLORIDE mmol/L 96*   CO2 mmol/L 30.0*   BUN mg/dL 83*   CREATININE mg/dL 3.40*   CALCIUM mg/dL 11.2*   BILIRUBIN mg/dL 0.4   ALK PHOS U/L 84   ALT (SGPT) U/L 14   AST (SGOT) U/L 18   GLUCOSE mg/dL 170*     IMPRESSION:  1. FINDINGS consistent with a mechanical bowel obstruction with moderate  small bowel and gastric distention. The more distal ileum is  decompressed and there appears to be a transition point to the right of  midline in the lower pelvis. This may be on the basis of adhesions.  There is retained contrast and fecal material within the colon. There is  diverticulosis of the descending and sigmoid colon with no evidence of  diverticulitis. No evidence of free air or pneumoperitoneum.  2. Left lower lobe pneumonia with a small left parapneumonic effusion.  There is cardiomegaly. No pericardial effusion is present. There are  heavy coronary calcifications present..  3. Suspected cholelithiasis. No pericholecystic inflammatory changes  present.  4. Cortical cysts of the kidneys including a suspected hemorrhagic cyst  of the right kidney.        This report was finalized on 01/05/2023 19:41 by Dr. Randall Huerta MD.     Imaging        Results Review:   I reviewed the patient's new clinical results.    Assessment/Plan     Assessment & Plan       SBO (small bowel obstruction) ([facility])    Essential hypertension    LETITIA (acute kidney injury) ([facility])    Stage 3b chronic kidney disease (CKD) ([facility])    Left lower lobe pneumonia    Hyperkalemia    Chronic combined systolic (congestive) and diastolic (congestive) heart failure ([facility])      Patient with the above medical problems, will give her a bolus of lactated Ringer's, creatinine is 3.4, in the past has been on the average of 1.4, NG tube to low constant suction, check a KUB today, follow serial exams, hopefully will not require any surgical intervention.  I discussed the patient's findings and my recommendations with  patient, family and nursing staff    Tam Gabriel MD  01/06/23  09:04 CST    Time: Time spent with patient 45 minutes     Part of this note may be an electronic transcription/translation of spoken language to printed text using the Dragon Dictation System.

## 2023-01-06 NOTE — H&P
[facility] Medicine Services  HISTORY AND PHYSICAL    Date of Admission: 1/5/2023  Primary Care Physician: Provider, Adrianne Known    Subjective   Primary Historian: ED staff    Chief Complaint: Vomiting    History of Present Illness  82-year-old female presents from [facility].  The patient is brought to the facility today for nausea and vomiting.  The patient was found to have abdominal distention.  An NG tube was placed in the emergency department.  CT scan demonstrated a small bowel obstruction.  The patient has dementia, and is unable to give any history.  She continues to ask for red milk.  Patient's prior creatinine reviewed from epic on 9/3/2021 was 1.35-1.84.  On my exam, she is mouth breathing.  She is tachycardic.  Her O2 saturation 89%, and asked nursing staff to change her O2 delivery.        Review of Systems   Nausea and vomiting  Chronic dementia    Otherwise complete ROS reviewed and negative except as mentioned in the HPI.    Past Medical History:   Past Medical History:   Diagnosis Date   • Hyperlipidemia    • Hypertension      Past Surgical History:  Past Surgical History:   Procedure Laterality Date   • APPENDECTOMY     • HYSTERECTOMY     • TONSILLECTOMY     • WRIST SURGERY Right      Social History:  reports that she has never smoked. She has never used smokeless tobacco. She reports that she does not drink alcohol and does not use drugs.    Family History: Unknown     Allergies:  Allergies   Allergen Reactions   • Aspirin      Unknown   • Codeine      Unknown     • Motrin [Ibuprofen]      Unknown       Medications:  Prior to Admission medications    Medication Sig Start Date End Date Taking? Authorizing Provider   acetaminophen (TYLENOL) 325 MG tablet Take 650 mg by mouth Every 6 (Six) Hours As Needed for Mild Pain .    Provider, MD Hope   carvedilol (COREG) 3.125 MG tablet Take 1 tablet by mouth Every 12 (Twelve) Hours. 2/1/18   Aakash  "Mary Thompson MD   Ferrous Fumarate 325 (106 Fe) MG tablet Take 1 tablet by mouth 2 (Two) Times a Day.    Provider, MD Hope   furosemide (LASIX) 40 MG tablet Take 1 tablet by mouth 2 (Two) Times a Day.  Patient taking differently: Take 20 mg by mouth 2 (Two) Times a Day. 2/1/18   Mary Finn MD   hydrALAZINE (APRESOLINE) 50 MG tablet Take 1 tablet by mouth Every 8 (Eight) Hours. 2/1/18   Mary Finn MD   isosorbide dinitrate (ISORDIL) 20 MG tablet Take 1 tablet by mouth Every 8 (Eight) Hours. 2/1/18   Mary Finn MD   potassium chloride (MICRO-K) 10 MEQ CR capsule Take 4 capsules by mouth Daily. 2/2/18   Mary Finn MD   silver sulfadiazine (SILVADENE, SSD) 1 % cream Apply  topically Every 12 (Twelve) Hours. 2/1/18   Mary Finn MD     I have utilized all available immediate resources to obtain, update, or review the patient's current medications (including all prescriptions, over-the-counter products, herbals, cannabis/cannabidiol products, and vitamin/mineral/dietary (nutritional) supplements).    Objective     Vital Signs: /65   Pulse 115   Temp 98.1 °F (36.7 °C)   Resp 26   Ht 175.3 cm (69\")   Wt 51.3 kg (113 lb)   SpO2 94%   BMI 16.69 kg/m²   Physical Exam  Vitals reviewed.   Constitutional:       Comments: Thin and cachectic   HENT:      Head: Normocephalic and atraumatic.      Right Ear: External ear normal.      Left Ear: External ear normal.      Nose: Nose normal.      Comments: NG tube in place     Mouth/Throat:      Mouth: Mucous membranes are dry.      Pharynx: No oropharyngeal exudate.   Eyes:      General: No scleral icterus.     Conjunctiva/sclera: Conjunctivae normal.   Cardiovascular:      Rate and Rhythm: Regular rhythm. Tachycardia present.      Heart sounds: Normal heart sounds.   Pulmonary:      Effort: Pulmonary effort is normal.      Breath sounds: Rhonchi present.      Comments: Congested cough  Abdominal:      General: There is " distension.      Palpations: Abdomen is soft.      Tenderness: There is no abdominal tenderness.   Musculoskeletal:         General: No tenderness.      Cervical back: Normal range of motion and neck supple.      Comments: Hyperkyphotic thoracic spine   Skin:     General: Skin is warm and dry.      Comments: Dry skin on the pretibial surfaces bilaterally   Neurological:      Mental Status: She is alert. She is disoriented.      Cranial Nerves: No cranial nerve deficit.   Psychiatric:      Comments: Confused, patient keeps asking for red milk       Results Reviewed:  Lab Results (last 24 hours)     Procedure Component Value Units Date/Time    Blood Culture - Blood, Arm, Right [[id number]] Collected: 01/05/23 2113    Specimen: Blood from Arm, Right Updated: 01/05/23 2150    Blood Culture - Blood, Arm, Left [[id number]] Collected: 01/05/23 2121    Specimen: Blood from Arm, Left Updated: 01/05/23 2150    Urinalysis, Microscopic Only - Straight Cath [[id number]]  (Abnormal) Collected: 01/05/23 2118    Specimen: Urine from Straight Cath Updated: 01/05/23 2148     RBC, UA 0-2 /HPF      WBC, UA 3-5 /HPF      Comment: Urine culture not indicated.        Bacteria, UA None Seen /HPF      Squamous Epithelial Cells, UA 0-2 /HPF      Hyaline Casts, UA 7-12 /LPF      Methodology Automated Microscopy    Urinalysis With Culture If Indicated - Straight Cath [[id number]]  (Abnormal) Collected: 01/05/23 2118    Specimen: Urine from Straight Cath Updated: 01/05/23 2148     Color, UA Dark Yellow     Appearance, UA Cloudy     pH, UA <=5.0     Specific Gravity, UA 1.017     Glucose, UA Negative     Ketones, UA Trace     Bilirubin, UA Small (1+)     Blood, UA Negative     Protein,  mg/dL (2+)     Leuk Esterase, UA Trace     Nitrite, UA Negative     Urobilinogen, UA 0.2 E.U./dL    Narrative:      In absence of clinical symptoms, the presence of pyuria, bacteria, and/or nitrites on the urinalysis result does not correlate with infection.     Manual Differential [[id number]]  (Abnormal) Collected: 01/05/23 1720    Specimen: Blood Updated: 01/05/23 1752     Neutrophil % 29.0 %      Lymphocyte % 3.0 %      Monocyte % 3.0 %      Bands %  60.0 %      Atypical Lymphocyte % 5.0 %      Neutrophils Absolute 7.20 10*3/mm3      Lymphocytes Absolute 0.65 10*3/mm3      Monocytes Absolute 0.24 10*3/mm3      Crenated RBC's Slight/1+     Poikilocytes Slight/1+     Polychromasia Slight/1+     WBC Morphology Normal     Clumped Platelets Present    CBC & Differential [[id number]]  (Abnormal) Collected: 01/05/23 1720    Specimen: Blood Updated: 01/05/23 1752    Narrative:      The following orders were created for panel order CBC & Differential.  Procedure                               Abnormality         Status                     ---------                               -----------         ------                     CBC Auto Differential[[id number]]        Abnormal            Final result                 Please view results for these tests on the individual orders.    CBC Auto Differential [[id number]]  (Abnormal) Collected: 01/05/23 1720    Specimen: Blood Updated: 01/05/23 1752     WBC 8.09 10*3/mm3      RBC 5.56 10*6/mm3      Hemoglobin 14.9 g/dL      Hematocrit 49.1 %      MCV 88.3 fL      MCH 26.8 pg      MCHC 30.3 g/dL      RDW 15.3 %      RDW-SD 50.0 fl      MPV 11.1 fL      Platelets 422 10*3/mm3     Narrative:      Appended report. These results have been appended to a previously verified report.    Comprehensive Metabolic Panel [[id number]]  (Abnormal) Collected: 01/05/23 1720    Specimen: Blood Updated: 01/05/23 1744     Glucose 170 mg/dL      BUN 83 mg/dL      Creatinine 3.40 mg/dL      Sodium 142 mmol/L      Potassium 5.7 mmol/L      Chloride 96 mmol/L      CO2 30.0 mmol/L      Calcium 11.2 mg/dL      Total Protein 8.9 g/dL      Albumin 4.0 g/dL      ALT (SGPT) 14 U/L      AST (SGOT) 18 U/L      Alkaline Phosphatase 84 U/L      Total Bilirubin 0.4 mg/dL       Globulin 4.9 gm/dL      A/G Ratio 0.8 g/dL      BUN/Creatinine Ratio 24.4     Anion Gap 16.0 mmol/L      eGFR 13.1 mL/min/1.73      Comment: <15 Indicative of kidney failure       Narrative:      GFR Normal >60  Chronic Kidney Disease <60  Kidney Failure <15    The GFR formula is only valid for adults with stable renal function between ages 18 and 70.    Lipase [[id number]]  (Normal) Collected: 01/05/23 1720    Specimen: Blood Updated: 01/05/23 1739     Lipase 16 U/L         Imaging Results (Last 24 Hours)     Procedure Component Value Units Date/Time    XR Abdomen KUB [[id number]] Collected: 01/05/23 2052     Updated: 01/05/23 2056    Narrative:      EXAMINATION: KUB radiograph 01/05/2023     HISTORY: NG tube placement     FINDINGS: An NG tube has been successfully advanced into the proximal  body of the stomach.  This report was finalized on 01/05/2023 20:53 by Dr. Randall Huerta MD.    XR Chest 1 View [[id number]] Collected: 01/05/23 2035     Updated: 01/05/23 2039    Narrative:      EXAMINATION: Chest 1 view 01/05/2023     HISTORY: Hypoxia.     FINDINGS: Upright frontal projection of the chest demonstrates left  lower lobe consolidation with partial silhouetting of the left  diaphragm. There is atheromatous calcification of the thoracic aorta.  Lungs are otherwise clear. A small left effusion is suspected with  blunting of the lateral costophrenic angle. There is gaseous distention  of the small bowel within the upper abdomen.       Impression:      1.. Left lower lobe consolidation suggesting a infiltrate. A small left  effusion is present. Lungs are otherwise clear.  This report was finalized on 01/05/2023 20:36 by Dr. Randall Huerta MD.    CT Abdomen Pelvis Without Contrast [[id number]] Collected: 01/05/23 1925     Updated: 01/05/23 1944    Narrative:      CT ABDOMEN PELVIS WO CONTRAST- 1/5/2023 6:36 PM CST     HISTORY: eval for SBO      COMPARISON: None      DLP: 487 mGy cm. All CT scans are performed  using dose optimization  techniques as appropriate to the performed exam and including at least  one of the following: Automated exposure control, adjustment of the mA  and/or kV according to size, and the use of the iterative reconstruction  technique.     TECHNIQUE: Noncontrast enhanced images of the abdomen and pelvis  obtained without oral contrast.      FINDINGS: RIGHT basilar atelectasis is present. There is more confluent  disease within the left lower lobe suggesting left lower lobe pneumonia  with a small left-sided parapneumonic effusion. There is moderate  cardiomegaly. Heavy coronary calcifications are present. There is no  evidence of pericardial effusion.   There is enlargement of the pulmonary arteries suggesting pulmonary  arterial hypertension..      LIVER: There is colonic interposition beneath the right hemidiaphragm.  The liver is homogeneous in density without evidence of focal hepatic  mass..      BILIARY SYSTEM: There is increased density dependently within the  gallbladder suggesting layering gallstones. No pericholecystic  inflammatory changes are present. Mild extrahepatic biliary dilatation  is present.     PANCREAS: No focal pancreatic lesion.      SPLEEN: Unremarkable.      KIDNEYS AND ADRENALS: There are cortical cysts of both kidneys. There is  a hyperdense lesion involving the midpole of the right kidney for which  I would favor hemorrhagic cyst. This measures 1.2 cm in size measuring  Hounsfield units of 90. No perinephric fluid collections are present..   The ureters are decompressed and normal in appearance.     RETROPERITONEUM: There is heavy atheromatous calcification of the  abdominal aorta without evidence of aneurysm. There is no evidence of  retroperitoneal hematoma or adenopathy..      GI TRACT: There is a small amount of retained contrast and fecal  material within the colon. No evidence of constipation. There are  diverticula within the descending and sigmoid colon with no  evidence of  acute diverticulitis.. The appendix is surgically absent. There is  moderate small bowel distention as well as fluid distention of the  stomach suggesting a mechanical bowel obstruction. The more distal ileum  is decompressed with the apparent transition noted on images 61 through  68 just to the right of midline within the pelvis. This may be on the  basis of adhesions. There is no evidence of pneumatosis. No evidence of  pneumoperitoneum..     OTHER: There is no mesenteric mass, lymphadenopathy or fluid collection.  The osseous structures and soft tissues demonstrate no worrisome  lesions. No free fluid is present.      PELVIS: The patient has undergone prior hysterectomy.. The urinary  bladder is normal in appearance.       Impression:      1. FINDINGS consistent with a mechanical bowel obstruction with moderate  small bowel and gastric distention. The more distal ileum is  decompressed and there appears to be a transition point to the right of  midline in the lower pelvis. This may be on the basis of adhesions.  There is retained contrast and fecal material within the colon. There is  diverticulosis of the descending and sigmoid colon with no evidence of  diverticulitis. No evidence of free air or pneumoperitoneum.  2. Left lower lobe pneumonia with a small left parapneumonic effusion.  There is cardiomegaly. No pericardial effusion is present. There are  heavy coronary calcifications present..  3. Suspected cholelithiasis. No pericholecystic inflammatory changes  present.  4. Cortical cysts of the kidneys including a suspected hemorrhagic cyst  of the right kidney.        This report was finalized on 01/05/2023 19:41 by Dr. Randall Huerta MD.        I have personally reviewed and interpreted the radiology studies and ECG obtained at time of admission.     Assessment / Plan   Assessment:   Active Hospital Problems    Diagnosis    • **SBO (small bowel obstruction) (HCC)      Impression:  1.  Mechanical bowel obstruction with moderate small bowel and gastric distention  2. Left lower lobe pneumonia with a small left parapneumonic effusion.  3. Nausea and vomiting  4. LETITIA with dehydration  5. Hyperkalemia  6. Dementia  7. Hypertension  8. Bandemia    Treatment Plan  1.  Admit to the hospital  2.  Rocephin and azithromycin started in the emergency department, will continue  3.  DuoNeb, half-strength  4.  Zofran as needed  5.  NG tube placed in the emergency department, will continue to maintenance   6.  IV fluids  7.  Follow-up labs in the morning   8.  KUB in the morning  9.  General surgery, Dr. Whittington was notified from the emergency department  10.  Fall/skin/aspiration precautions  11.  Palliative care consult    The patient will be admitted to my service here at [facility].  Team to take over in the morning    Medical Decision Making  Number and Complexity of problems: 5, high  Differential Diagnosis: Sepsis    Conditions and Status        Condition is worsening.     MDM Data  External documents reviewed: None  Cardiac tracing (EKG, telemetry) interpretation: None  Radiology interpretation: None  Labs reviewed: All  Any tests that were considered but not ordered: None     Decision rules/scores evaluated (example [id number], Wells, etc): None     Discussed with: ED staff     Care Planning  Shared decision making: ED staff  Code status and discussions: Full    Disposition  Social Determinants of Health that impact treatment or disposition: Nursing home patient  Estimated length of stay is extended.     I confirmed that the patient's advanced care plan is present, code status is documented, and a surrogate decision maker is listed in the patient's medical record.     The patient's surrogate decision maker is family.     The patient was seen and examined by me on 1/5/2023 at seen before midnight.    Electronically signed by Dony Samuels DO, 01/05/23, 22:18 CST.

## 2023-01-06 NOTE — PLAN OF CARE
Goal Outcome Evaluation:              Outcome Evaluation: NTN assessment complete. NPO with NGT continuous low suction. Bilateral wrist restraints, confused. Will monitor POC and follow per protocol.

## 2023-01-06 NOTE — ED NOTES
"Patient refusing to keep vapotherm on. Screamed at this nurse and KAYLI Key \"NO, LEAVE ME ALONE\". Attempts to redirect unsuccessful. Contacting MD Peralta for further orders.   "

## 2023-01-06 NOTE — CONSULTS
[facility] Palliative Care Services  Initial Consult    Attending Physician: Claudio Cobb MD  Referring Provider: Dony Samuels DO / Claudio Cobb MD    Patient Name: Susana Delcid  Date of Admission: 1/5/2023  Today's Date: 01/06/23     Reason for Referral: Goals of Care/Advance Care Planning    Code Status and Medical Interventions:   Ordered at: 01/05/23 3448     Level Of Support Discussed With:    Patient     Code Status (Patient has no pulse and is not breathing):    CPR (Attempt to Resuscitate)     Medical Interventions (Patient has pulse or is breathing):    Full Support        Subjective     HPI: 82 y.o. female with past medical history of hyperlipidemia and hypertension.  Additional chart review completed including nursing facility documents which reveals she has a legal guardian. H&P also mentions dementia. Patient presented to [facility] on 1/5/2023 related to nausea and vomiting.  According to chart review she is a resident at [facility].  Work-up in ED revealed multiple abnormalities in labs including potassium 5.7, creatinine 3.40, BUN 83, GFR 13.1, calcium 11.2, total protein 8.9, RBC 5.56 and hematocrit 49.1.  CT of abdomen and pelvis completed revealing findings consistent with mechanical bowel obstruction with moderate small bowel and gastric distention.  Diverticulosis of the descending and sigmoid colon also visualized without evidence of diverticulitis and no evidence of free air or pneumoperitoneum visualized.  CT also visualized findings consistent with LLL pneumonia with small left parapneumonic effusion, heavy coronary calcifications present, suspected cholelithiasis and cortical cysts of kidneys including suspected hemorrhagic cyst of right kidney per radiology report.  UA negative for bacteria or nitrite.  Chart review reveals she would remove oxygen frequently and was hypoxic.  Nursing notes reveal she was eventually able to keep NRB mask  "in place.  ABGs collected last night while on 15 L oxygen via NRB mask revealed pH 7.377, PCO2 54.6 and PO2 126.  General surgery contacted while in ED and made recommendations.  She had NG tube placed in ED and was admitted to the medical floor for further work-up and treatment.  Labs collected this morning reveal she remains hyperkalemic with potassium 5.7, renal function remains impaired with creatinine 4.01,  and GFR 10.7.  WBC now elevated at 11.53. She is lying in bed, alert and in no apparent distress at time of exam. NGT to suction.  Able to correctly state her name and that she is in the hospital however unable to provide much more history.  She is extremely hard of hearing.  Denies pain although states \"tired of that tickle in the back of my throat\" and stated \"I keep trying to get it out but I can't\" referring to NGT.  Educated importance of leaving in.     Advance Care Planning   Advanced Directives: Guardian documents obtained and copy faxed to medical records to be placed in EMR.    Advance Care Planning Discussion: Ms. Delcid is alert and oriented to self and place however unable to provide further details regarding orientation.  Unable to demonstrate ability to make complex medical decisions on her behalf at this time.  Call placed to her guardian, Margarito Steel, to discuss goals of care.  Discussed hospitalization and findings with Mr. Steel and current treatment plan.  Explored goals of care and Mr. Steel stated \"Mrs. Delcid has never expressed any end-of-life decisions.\"  He questioned whether Mrs. Delcid's current condition was at a \"point of no recovery.\"  Explained treatment plan to monitor for resolution of SBO through decompression with NGT.  Mr. Steel states until Mrs. Delcid has \"all possible assistant/interventions and until doctors determine she is beyond their medical science to prolong life or to the point that medical science and interventions to prolong life causes more " "harm than good full support measures should be continued.\"  Plans to continue CPR and full support interventions.     Patient's guardian is her Margarito Steel.    Due to the palliative care topics discussed including goals of care and treatment options we will establish an advance care plan.      Review of Systems   Unable to perform ROS: other (Extremely hard of hearing making it difficult to complete thorough ROS)   HENT: Positive for sore throat (referring to NGT). Negative for congestion and stridor.    Eyes: Negative for pain, photophobia and visual disturbance.   Cardiovascular: Negative for chest pain.   Respiratory: Negative for cough.    Endocrine: Negative for polydipsia, polyphagia and polyuria.   Skin: Negative for dry skin, flushing and itching.   Musculoskeletal: Negative for back pain, myalgias and neck pain.   Gastrointestinal: Negative for abdominal pain, nausea and vomiting.   Neurological: Positive for weakness.   Psychiatric/Behavioral: Negative for depression. The patient does not have insomnia and is not nervous/anxious.    Allergic/Immunologic: Negative for environmental allergies, hives and persistent infections.      Pain Assessment  Nonverbal Indicators of Pain: nonverbal indicators absent  Past Medical History:   Diagnosis Date   • Hyperlipidemia    • Hypertension       Past Surgical History:   Procedure Laterality Date   • APPENDECTOMY     • HYSTERECTOMY     • TONSILLECTOMY     • WRIST SURGERY Right       Social History     Socioeconomic History   • Marital status:    Tobacco Use   • Smoking status: Never   • Smokeless tobacco: Never   Substance and Sexual Activity   • Alcohol use: No   • Drug use: No   • Sexual activity: Defer     History reviewed. No pertinent family history.   Allergies   Allergen Reactions   • Aspirin      Unknown   • Codeine      Unknown     • Motrin [Ibuprofen]      Unknown       Objective   Diagnostics: Reviewed  No intake or output data in the 24 hours " ending 01/06/23 0802    Current medication reviewed for route, type, dose and frequency and are current per MAR at time of dictation.  Current Facility-Administered Medications   Medication Dose Route Frequency Provider Last Rate Last Admin   • acetaminophen (TYLENOL) tablet 650 mg  650 mg Oral Q4H PRN Dony Samuels DO       • albuterol (PROVENTIL) nebulizer solution 0.5% 2.5 mg/0.5mL  1.25 mg Nebulization 4x Daily - RT Dony Samuels DO   1.25 mg at 01/06/23 0649    And   • ipratropium (ATROVENT) nebulizer solution 0.5 mg  0.5 mg Nebulization 4x Daily - RT Dony Samuels DO   0.5 mg at 01/06/23 0649   • [START ON 1/7/2023] azithromycin (ZITHROMAX) 500 mg in sodium chloride 0.9 % 250 mL IVPB-VTB  500 mg Intravenous Q24H Dony Samuels DO       • cefTRIAXone (ROCEPHIN) 1 g in sodium chloride 0.9 % 100 mL IVPB-VTB  1 g Intravenous Q24H Dony Samuels DO       • HYDROmorphone (DILAUDID) injection 0.25 mg  0.25 mg Intravenous Q4H PRN Dony Samuels DO       • lactated ringers infusion  75 mL/hr Intravenous Continuous Dony Samuels DO 75 mL/hr at 01/06/23 0212 75 mL/hr at 01/06/23 0212   • ondansetron (ZOFRAN) injection 4 mg  4 mg Intravenous Q6H PRN Dony Samuels DO       • silver sulfadiazine (SILVADENE, SSD) 1 % cream   Topical Q12H PRN Dony Samuels DO       • sodium chloride 0.9 % flush 10 mL  10 mL Intravenous Q12H Dony Samuels DO   10 mL at 01/06/23 0211   • sodium chloride 0.9 % flush 10 mL  10 mL Intravenous PRN Dony Samuels DO       • sodium chloride 0.9 % infusion 40 mL  40 mL Intravenous PRN Dony Samuels DO        lactated ringers, 75 mL/hr, Last Rate: 75 mL/hr (01/06/23 0212)     •  acetaminophen  •  HYDROmorphone  •  ondansetron  •  silver sulfadiazine  •  sodium chloride  •  sodium chloride  Assessment   /88 (BP Location: Left arm, Patient Position: Sitting)   Pulse 96   Temp 97.6 °F (36.4 °C) (Oral)   Resp 16   Ht 175.3 cm  "(69\")   Wt 51.3 kg (113 lb)   SpO2 96%   BMI 16.69 kg/m²     Physical Exam  Vitals and nursing note reviewed.   Constitutional:       General: She is not in acute distress.     Appearance: She is underweight. She is ill-appearing.      Interventions: She is restrained.   HENT:      Head: Normocephalic and atraumatic.      Right Ear: Decreased hearing noted.      Left Ear: Decreased hearing noted.      Nose:      Comments: NGT to suction.  Eyes:      General: Lids are normal.      Conjunctiva/sclera:      Right eye: Right conjunctiva is injected.      Left eye: Left conjunctiva is injected.   Neck:      Vascular: No JVD.      Trachea: Trachea normal.   Cardiovascular:      Rate and Rhythm: Regular rhythm. Tachycardia present.   Pulmonary:      Effort: Pulmonary effort is normal.      Breath sounds: Rhonchi present.   Chest:      Chest wall: No deformity or swelling.   Abdominal:      General: Bowel sounds are normal. There is distension.      Palpations: Abdomen is soft.   Musculoskeletal:      Cervical back: Neck supple.   Skin:     General: Skin is warm and dry.   Neurological:      Mental Status: She is alert.      Motor: Weakness present.   Psychiatric:         Mood and Affect: Mood is not anxious.         Behavior: Behavior is agitated (\"wants tube out\").     Functional status: Palliative Performance Scale Score: Performance 10% based on the following measures: Ambulation: Totally bed bound, Activity and Evidence of Disease: Unable to do any work, extensive evidence of disease, Self-Care: Total care required,  Intake: Mouth care only, LOC: Drowsy or comatose.  Nutritional status: Albumin 3.7. Body mass index is 16.69 kg/m².  Patient status: Disease state: Controlled with current treatments.    Impression/Problem List:  1. Small bowel obstruction      2. Acute kidney injury  3. Chronic kidney disease stage IIIb  4. Hyperkalemia  5. Underweight (BMI 16.69)  6. Consolidation of LLL per CXR  7. Essential " "hypertension   8. Advanced age  9. Chronic combined systolic and diastolic CHF per echocardiogram (2018)  10. Impaired hearing    Plan / Recommendations     1. Palliative Care Encounter   - Goals of care include CODE STATUS CPR with full support interventions.    - Prognosis is guarded long-term secondary to SBO, LETITIA superimposed on CKD stage IIIb, hyperkalemia, underweight, advanced age and other comorbidities listed above.    - Outside records reviewed including nursing facility documentation.  Discovered patient has appointed guardian.  Documentation obtained with assistance of palliative SW.  Copy has been faxed to medical records to be placed in EMR.    - Discussed condition with patient's guardian, Margarito Steel.  Details of discussion above.    - Plans to continue \"all possible assistant/interventions and until doctors determine she is beyond their medical science to prolong life or to the point that medical science and interventions to prolong life causes more harm than good full support measures should be continued.\"     Thank you for allowing us to participate in patient's plan of care. Palliative Care Team will continue to follow patient. Will plan to follow up next week as needed if patient remains hospitalized as there is no palliative care coverage over the weekend.     Time spent:60 minutes spent reviewing medical and medication records, assessing and examining patient, discussing with family, answering questions, providing some guidance about a plan and documentation of care, and coordinating care with other healthcare members, with > 50% time spent face to face.     Electronically signed by, JAMIR Beaver, 01/06/23.                "

## 2023-01-07 ENCOUNTER — APPOINTMENT (OUTPATIENT)
Dept: GENERAL RADIOLOGY | Facility: HOSPITAL | Age: 83
DRG: 388 | End: 2023-01-07
Payer: MEDICARE

## 2023-01-07 LAB
ALBUMIN SERPL-MCNC: 2.8 G/DL (ref 3.5–5.2)
ALBUMIN/GLOB SERPL: 0.8 G/DL
ALP SERPL-CCNC: 67 U/L (ref 39–117)
ALT SERPL W P-5'-P-CCNC: 15 U/L (ref 1–33)
ANION GAP SERPL CALCULATED.3IONS-SCNC: 13 MMOL/L (ref 5–15)
AST SERPL-CCNC: 17 U/L (ref 1–32)
BILIRUB SERPL-MCNC: 0.3 MG/DL (ref 0–1.2)
BUN SERPL-MCNC: 91 MG/DL (ref 8–23)
BUN/CREAT SERPL: 31.7 (ref 7–25)
CALCIUM SPEC-SCNC: 9.1 MG/DL (ref 8.6–10.5)
CHLORIDE SERPL-SCNC: 105 MMOL/L (ref 98–107)
CO2 SERPL-SCNC: 34 MMOL/L (ref 22–29)
CREAT SERPL-MCNC: 2.87 MG/DL (ref 0.57–1)
DEPRECATED RDW RBC AUTO: 50.5 FL (ref 37–54)
EGFRCR SERPLBLD CKD-EPI 2021: 16 ML/MIN/1.73
ERYTHROCYTE [DISTWIDTH] IN BLOOD BY AUTOMATED COUNT: 15.3 % (ref 12.3–15.4)
GLOBULIN UR ELPH-MCNC: 3.4 GM/DL
GLUCOSE SERPL-MCNC: 121 MG/DL (ref 65–99)
HCT VFR BLD AUTO: 44.8 % (ref 34–46.6)
HGB BLD-MCNC: 13.3 G/DL (ref 12–15.9)
MCH RBC QN AUTO: 26.8 PG (ref 26.6–33)
MCHC RBC AUTO-ENTMCNC: 29.7 G/DL (ref 31.5–35.7)
MCV RBC AUTO: 90.3 FL (ref 79–97)
PLATELET # BLD AUTO: 306 10*3/MM3 (ref 140–450)
PMV BLD AUTO: 11.7 FL (ref 6–12)
POTASSIUM SERPL-SCNC: 4 MMOL/L (ref 3.5–5.2)
PROT SERPL-MCNC: 6.2 G/DL (ref 6–8.5)
RBC # BLD AUTO: 4.96 10*6/MM3 (ref 3.77–5.28)
SODIUM SERPL-SCNC: 152 MMOL/L (ref 136–145)
WBC NRBC COR # BLD: 16.14 10*3/MM3 (ref 3.4–10.8)

## 2023-01-07 PROCEDURE — 85027 COMPLETE CBC AUTOMATED: CPT | Performed by: SPECIALIST

## 2023-01-07 PROCEDURE — 94761 N-INVAS EAR/PLS OXIMETRY MLT: CPT

## 2023-01-07 PROCEDURE — 94799 UNLISTED PULMONARY SVC/PX: CPT

## 2023-01-07 PROCEDURE — 25010000002 AZITHROMYCIN PER 500 MG: Performed by: INTERNAL MEDICINE

## 2023-01-07 PROCEDURE — 94664 DEMO&/EVAL PT USE INHALER: CPT

## 2023-01-07 PROCEDURE — 80053 COMPREHEN METABOLIC PANEL: CPT | Performed by: SPECIALIST

## 2023-01-07 PROCEDURE — 25010000002 CEFTRIAXONE PER 250 MG: Performed by: INTERNAL MEDICINE

## 2023-01-07 PROCEDURE — 99231 SBSQ HOSP IP/OBS SF/LOW 25: CPT | Performed by: SPECIALIST

## 2023-01-07 PROCEDURE — 74019 RADEX ABDOMEN 2 VIEWS: CPT

## 2023-01-07 RX ORDER — DEXTROSE AND SODIUM CHLORIDE 5; .45 G/100ML; G/100ML
75 INJECTION, SOLUTION INTRAVENOUS CONTINUOUS
Status: DISCONTINUED | OUTPATIENT
Start: 2023-01-07 | End: 2023-01-08

## 2023-01-07 RX ADMIN — Medication 10 ML: at 20:32

## 2023-01-07 RX ADMIN — ALBUTEROL SULFATE 1.25 MG: 2.5 SOLUTION RESPIRATORY (INHALATION) at 21:14

## 2023-01-07 RX ADMIN — METOPROLOL TARTRATE 5 MG: 5 INJECTION INTRAVENOUS at 17:05

## 2023-01-07 RX ADMIN — ALBUTEROL SULFATE 1.25 MG: 2.5 SOLUTION RESPIRATORY (INHALATION) at 14:11

## 2023-01-07 RX ADMIN — SODIUM CHLORIDE, POTASSIUM CHLORIDE, SODIUM LACTATE AND CALCIUM CHLORIDE 1000 ML: 600; 310; 30; 20 INJECTION, SOLUTION INTRAVENOUS at 11:58

## 2023-01-07 RX ADMIN — METOPROLOL TARTRATE 5 MG: 5 INJECTION INTRAVENOUS at 11:58

## 2023-01-07 RX ADMIN — METOPROLOL TARTRATE 5 MG: 5 INJECTION INTRAVENOUS at 23:17

## 2023-01-07 RX ADMIN — CEFTRIAXONE 1 G: 1 INJECTION, POWDER, FOR SOLUTION INTRAMUSCULAR; INTRAVENOUS at 20:31

## 2023-01-07 RX ADMIN — IPRATROPIUM BROMIDE 0.5 MG: 0.5 SOLUTION RESPIRATORY (INHALATION) at 21:14

## 2023-01-07 RX ADMIN — IPRATROPIUM BROMIDE 0.5 MG: 0.5 SOLUTION RESPIRATORY (INHALATION) at 10:22

## 2023-01-07 RX ADMIN — AZITHROMYCIN MONOHYDRATE 500 MG: 500 INJECTION, POWDER, LYOPHILIZED, FOR SOLUTION INTRAVENOUS at 23:17

## 2023-01-07 RX ADMIN — METOPROLOL TARTRATE 5 MG: 5 INJECTION INTRAVENOUS at 06:24

## 2023-01-07 RX ADMIN — IPRATROPIUM BROMIDE 0.5 MG: 0.5 SOLUTION RESPIRATORY (INHALATION) at 14:11

## 2023-01-07 RX ADMIN — IPRATROPIUM BROMIDE 0.5 MG: 0.5 SOLUTION RESPIRATORY (INHALATION) at 06:47

## 2023-01-07 RX ADMIN — ALBUTEROL SULFATE 1.25 MG: 2.5 SOLUTION RESPIRATORY (INHALATION) at 10:22

## 2023-01-07 RX ADMIN — DEXTROSE AND SODIUM CHLORIDE 75 ML/HR: 5; 450 INJECTION, SOLUTION INTRAVENOUS at 08:47

## 2023-01-07 RX ADMIN — ALBUTEROL SULFATE 1.25 MG: 2.5 SOLUTION RESPIRATORY (INHALATION) at 06:47

## 2023-01-07 NOTE — PLAN OF CARE
Goal Outcome Evaluation:  Plan of Care Reviewed With: patient        Progress: no change  Outcome Evaluation: NG to low cont suction. 1200 emptied so far this shift.  abd soft.  pt did have loose BM this shift. restraints in place, pt still pulling at tubing. 5L high flow. NS infusing. pt still wanting water after several attempts of explaining she cannot have anything. safety maintained.

## 2023-01-07 NOTE — PLAN OF CARE
Goal Outcome Evaluation:  Plan of Care Reviewed With: patient        Progress: no change  Outcome Evaluation: PATIENT ORIENTED TO SELF ONLY. NO S/S OF PAIN. NG TUBE ADVANCED AN ADDITIONAL 5 CM TODAY TO 60 CM. PATIENT HAS HAD 1200 ML OUT SO FAR THIS SHIFT. 1 LITER LR BOLUS GIVEN. KUB DONE TODAY AND TO BE DONE AGAIN TOMORROW. RECEIVING IV LOPRESSOR Q6H. SINUS 81-96, WITH A RUN OF 23 PVCs IN A ROW. RESTRAINTS IN PLACE TO PREVENT PATIENT FROM PULLING OUT NG TUBE. TURN Q2H. CONT TO MONITOR.

## 2023-01-07 NOTE — PROGRESS NOTES
[facility] Medicine Services  INPATIENT PROGRESS NOTE    Patient Name: Susana Delcid  Date of Admission: 1/5/2023  Today's Date: 01/07/23  Length of Stay: 2  Primary Care Physician: Provider, No Known    Subjective   Chief Complaint: Nausea/small bowel obstruction/failure to thrive/acute on chronic renal failure/CHF    HPI   Patient is requiring 6 L of oxygen.  T-max 98.6.  NG tube in place, multiple fluid removed from stomach with NG tube dark greenish discoloration.  Blood pressure slightly high.  Patient is requiring restraint because patient continued to pull out NG tube.  Sodium is high, change fluids to D5 half-normal saline.  Kidney functions improving slowly.  Dehydration is improving slowly.  Leukocytosis increasing.    Review of Systems   Unable to obtain due to altered ental status.  Altered mental status is improving.  Patient is more alert today than yesterday.    All pertinent negatives and positives are as above. All other systems have been reviewed and are negative unless otherwise stated.     Objective    Temp:  [97.6 °F (36.4 °C)-98.6 °F (37 °C)] 98.6 °F (37 °C)  Heart Rate:  [] 75  Resp:  [16-22] 18  BP: (109-172)/(62-88) 172/83  Physical Exam  Vitals and nursing note reviewed.   Constitutional:       Comments: Cachectic.  Chronically ill.  Advanced age.   HENT:      Head: Normocephalic and atraumatic.   Eyes:      Conjunctiva/sclera: Conjunctivae normal.      Pupils: Pupils are equal, round, and reactive to light.   Neck:      Vascular: No JVD.   Cardiovascular:      Rate and Rhythm: Normal rate and regular rhythm.      Heart sounds: Normal heart sounds.   Pulmonary:      Effort: No respiratory distress.      Breath sounds: No wheezing or rales.      Comments: Diminished breath sound bilateral, clear, on 6 L of oxygen  Chest:      Chest wall: No tenderness.   Abdominal:      General: Bowel sounds are normal. There is no distension.      Palpations: Abdomen  is soft.      Tenderness: There is no abdominal tenderness.   Musculoskeletal:         General: No tenderness or deformity.      Cervical back: Neck supple.   Skin:     General: Skin is warm and dry.      Capillary Refill: Capillary refill takes 2 to 3 seconds.      Findings: No rash.   Neurological:      Cranial Nerves: No cranial nerve deficit.      Motor: Weakness present. No abnormal muscle tone.      Coordination: Coordination abnormal.      Gait: Gait abnormal.      Deep Tendon Reflexes: Reflexes normal.             Results Review:  I have reviewed the labs, radiology results, and diagnostic studies.    Laboratory Data:   Results from last 7 days   Lab Units 01/07/23  0444 01/06/23  0904 01/05/23  1720   WBC 10*3/mm3 16.14* 11.53* 8.09   HEMOGLOBIN g/dL 13.3 14.7 14.9   HEMATOCRIT % 44.8 47.1* 49.1*   PLATELETS 10*3/mm3 306 354 422        Results from last 7 days   Lab Units 01/07/23  0444 01/06/23  1253 01/06/23  0904 01/05/23  1720   SODIUM mmol/L 152* 142 140 142   POTASSIUM mmol/L 4.0 4.4 5.7* 5.7*   CHLORIDE mmol/L 105 98 94* 96*   CO2 mmol/L 34.0* 33.0* 32.0* 30.0*   BUN mg/dL 91* 96* 100* 83*   CREATININE mg/dL 2.87* 3.71* 4.01* 3.40*   CALCIUM mg/dL 9.1 9.5 10.1 11.2*   BILIRUBIN mg/dL 0.3  --  0.3 0.4   ALK PHOS U/L 67  --  82 84   ALT (SGPT) U/L 15  --  21 14   AST (SGOT) U/L 17  --  22 18   GLUCOSE mg/dL 121* 151* 172* 170*       Culture Data:   Blood Culture   Date Value Ref Range Status   01/05/2023 No growth at 24 hours  Preliminary   01/05/2023 No growth at 24 hours  Preliminary       Radiology Data:   Imaging Results (Last 24 Hours)     Procedure Component Value Units Date/Time    XR Abdomen Flat & Upright [[id number]] Resulted: 01/07/23 1356     Updated: 01/07/23 1356          I have reviewed the patient's current medications.     Assessment/Plan   Assessment  Active Hospital Problems    Diagnosis    • **SBO (small bowel obstruction) (HCC)    • LETITIA (acute kidney injury) (HCC)    • Stage 3b  chronic kidney disease (CKD) (HCC)    • Left lower lobe pneumonia    • Hyperkalemia    • Chronic combined systolic (congestive) and diastolic (congestive) heart failure (HCC)    • Essential hypertension        Treatment Plan  Patient is a 82-year-old female who presents to [facility] emergency department from [facility].  Patient has history of dementia, hyperlipidemia, hypertension.  This list is not at all exclusive as full history is limited at this time with patient being a poor historian. She presents with chief complaint of nausea and vomiting, the patient has abdominal distention, NG tube placed in emergency department.  CT scan abdomen shows small bowel obstruction.  Patient initially tachycardic and hypoxic requiring Vapotherm nasal cannula supplementation.  Creatinine on presentation 3.4, creatinine of 1.35 and September 2021.  GFR on presentation was 13, GFR in September 2021 was 38.     Small bowel obstruction- per general surgery.  NG tube in place ongoing a lot of dark greenish drainage-bile-like.  NG tube initially placed in stomach and hooked to low intermittent suction.  Patient pulled NG tube out.  Plan to reinstate NG tube.  Dr. Gabriel with general surgery following.    KUB-Feeding tube tip located in the upper gastric body with sidehole remaining at the gastroesophageal junction- Additional slight advancement is recommended, perhaps 5 cm.     Acute kidney injury superimposed on stage 3b chronic kidney disease-  Creatinine 3.4, BUN 83, GFR 13.1 on arrival.  These values are worsened from baseline of stage IIIb chronic kidney disease.  Creatinine 1.35 and GFR 38 in September 2021.    Creatinine is improving today slowly.    BNP improving.  GFR is improving.    Hypernatremia.  Change fluids to D5 half-normal saline.     Left lower lobe pneumonia- leukocytosis, increasing .  WBC 16, afebrile.   Azithromycin and Rocephin IV.  Chest x-ray-Left lower lobe consolidation  suggesting a infiltrate, small left effusion is present, Lungs are otherwise clear.  Patient is currently on Vapotherm 60/50.     Chronic combined systolic and diastolic heart failure/hypertension-  Echocardiogram from January 2018 shows EF 31 to 35% with diastolic dysfunction.  We will be mindful of this with IV hydration.  We will hold home Lasix for now.    Hold Coreg because patient is n.p.o . Lopressor IV for now.     Hyperkalemia- resolved.     Palliative care consulted.     Patient is a marquez of the [facility] and a full code.    Blood culture-no growth in 24 hours.    Medical Decision Making  Number and Complexity of problems: 2 high complexity problems in acute kidney injury and small bowel obstruction.  3 moderate complexity problems in pneumonia and chronic combined systolic and diastolic heart failure and hyperkalemia.  Differential Diagnosis: None    Conditions and Status  Small bowel obstruction stable  Acute kidney injury improving  Left lower lobe pneumonia stable  Chronic combined systolic and diastolic heart failure requiring more oxygen  Hyperkalemia resolved      MDM Data  External documents reviewed: Echocardiogram from 2018  Cardiac tracing (EKG, telemetry) interpretation: Telemetry shows trigeminy  Radiology interpretation: Reviewed radiologist interpretation of chest x-ray/KUB  Labs reviewed: Arterial blood gas, CMP, CBC with differential, urinalysis  Any tests that were considered but not ordered: Echocardiogram     Decision rules/scores evaluated (example [id number], Wells, etc): None     Discussed with: Patient, although I do not believe she understands.     Care Planning  Shared decision making: Discussed plan of care with patient in the presence of cognitive disability.  Code status and discussions: Full code, patient is a marquez of the [facility].    Disposition  Social Determinants of Health that impact treatment or disposition: Patient resides at a skilled nursing facility  Discharge disposition  is unclear at this time.        Electronically signed by Claudio Cobb MD, 01/07/23, 14:01 CST.

## 2023-01-07 NOTE — PLAN OF CARE
Problem: Adult Inpatient Plan of Care  Goal: Plan of Care Review  Outcome: Ongoing, Progressing  Flowsheets (Taken 1/6/2023 1841)  Progress: no change  Plan of Care Reviewed With: patient  Outcome Evaluation: NG tube placed to low cont suction per order. 700mL emptied after a few hours. Abd soft. Pt placed in restraints d/t multiple attempts to pull out NG tube and IV. MD notified and pt legal guardian Margarito Steel. NS infusing. O2 increased to 8L high-flow for short period of time, pt weaned back to 4L, currently tolerating with sats mid-90s. Other VSS

## 2023-01-08 ENCOUNTER — APPOINTMENT (OUTPATIENT)
Dept: GENERAL RADIOLOGY | Facility: HOSPITAL | Age: 83
DRG: 388 | End: 2023-01-08
Payer: MEDICARE

## 2023-01-08 LAB
ALBUMIN SERPL-MCNC: 2.9 G/DL (ref 3.5–5.2)
ALBUMIN/GLOB SERPL: 0.8 G/DL
ALP SERPL-CCNC: 68 U/L (ref 39–117)
ALT SERPL W P-5'-P-CCNC: 12 U/L (ref 1–33)
ANION GAP SERPL CALCULATED.3IONS-SCNC: 10 MMOL/L (ref 5–15)
ANION GAP SERPL CALCULATED.3IONS-SCNC: 11 MMOL/L (ref 5–15)
ANION GAP SERPL CALCULATED.3IONS-SCNC: 7 MMOL/L (ref 5–15)
ARTERIAL PATENCY WRIST A: POSITIVE
AST SERPL-CCNC: 14 U/L (ref 1–32)
ATMOSPHERIC PRESS: 756 MMHG
BASE EXCESS BLDA CALC-SCNC: 23.8 MMOL/L (ref 0–2)
BASOPHILS # BLD AUTO: 0.05 10*3/MM3 (ref 0–0.2)
BASOPHILS NFR BLD AUTO: 0.3 % (ref 0–1.5)
BDY SITE: ABNORMAL
BILIRUB SERPL-MCNC: 0.2 MG/DL (ref 0–1.2)
BODY TEMPERATURE: 37 C
BUN SERPL-MCNC: 51 MG/DL (ref 8–23)
BUN SERPL-MCNC: 55 MG/DL (ref 8–23)
BUN SERPL-MCNC: 64 MG/DL (ref 8–23)
BUN/CREAT SERPL: 31.3 (ref 7–25)
BUN/CREAT SERPL: 31.8 (ref 7–25)
BUN/CREAT SERPL: 34.2 (ref 7–25)
CALCIUM SPEC-SCNC: 9.2 MG/DL (ref 8.6–10.5)
CALCIUM SPEC-SCNC: 9.2 MG/DL (ref 8.6–10.5)
CALCIUM SPEC-SCNC: 9.3 MG/DL (ref 8.6–10.5)
CHLORIDE SERPL-SCNC: 101 MMOL/L (ref 98–107)
CHLORIDE SERPL-SCNC: 102 MMOL/L (ref 98–107)
CHLORIDE SERPL-SCNC: 105 MMOL/L (ref 98–107)
CO2 SERPL-SCNC: 40 MMOL/L (ref 22–29)
CO2 SERPL-SCNC: 44 MMOL/L (ref 22–29)
CO2 SERPL-SCNC: 46 MMOL/L (ref 22–29)
CREAT SERPL-MCNC: 1.63 MG/DL (ref 0.57–1)
CREAT SERPL-MCNC: 1.73 MG/DL (ref 0.57–1)
CREAT SERPL-MCNC: 1.87 MG/DL (ref 0.57–1)
DEPRECATED RDW RBC AUTO: 50.6 FL (ref 37–54)
EGFRCR SERPLBLD CKD-EPI 2021: 26.8 ML/MIN/1.73
EGFRCR SERPLBLD CKD-EPI 2021: 29.4 ML/MIN/1.73
EGFRCR SERPLBLD CKD-EPI 2021: 31.6 ML/MIN/1.73
EOSINOPHIL # BLD AUTO: 0.02 10*3/MM3 (ref 0–0.4)
EOSINOPHIL NFR BLD AUTO: 0.1 % (ref 0.3–6.2)
ERYTHROCYTE [DISTWIDTH] IN BLOOD BY AUTOMATED COUNT: 15.3 % (ref 12.3–15.4)
GAS FLOW AIRWAY: 15 LPM
GLOBULIN UR ELPH-MCNC: 3.5 GM/DL
GLUCOSE SERPL-MCNC: 137 MG/DL (ref 65–99)
GLUCOSE SERPL-MCNC: 154 MG/DL (ref 65–99)
GLUCOSE SERPL-MCNC: 212 MG/DL (ref 65–99)
HCO3 BLDA-SCNC: 50.4 MMOL/L (ref 20–26)
HCT VFR BLD AUTO: 43.7 % (ref 34–46.6)
HGB BLD-MCNC: 13.1 G/DL (ref 12–15.9)
IMM GRANULOCYTES # BLD AUTO: 0.09 10*3/MM3 (ref 0–0.05)
IMM GRANULOCYTES NFR BLD AUTO: 0.6 % (ref 0–0.5)
LYMPHOCYTES # BLD AUTO: 1 10*3/MM3 (ref 0.7–3.1)
LYMPHOCYTES NFR BLD AUTO: 7 % (ref 19.6–45.3)
Lab: ABNORMAL
MCH RBC QN AUTO: 27.1 PG (ref 26.6–33)
MCHC RBC AUTO-ENTMCNC: 30 G/DL (ref 31.5–35.7)
MCV RBC AUTO: 90.3 FL (ref 79–97)
MODALITY: ABNORMAL
MONOCYTES # BLD AUTO: 0.92 10*3/MM3 (ref 0.1–0.9)
MONOCYTES NFR BLD AUTO: 6.4 % (ref 5–12)
NEUTROPHILS NFR BLD AUTO: 12.28 10*3/MM3 (ref 1.7–7)
NEUTROPHILS NFR BLD AUTO: 85.6 % (ref 42.7–76)
NRBC BLD AUTO-RTO: 0 /100 WBC (ref 0–0.2)
PCO2 BLDA: 58.7 MM HG (ref 35–45)
PCO2 TEMP ADJ BLD: 58.7 MM HG (ref 35–45)
PH BLDA: 7.54 PH UNITS (ref 7.35–7.45)
PH, TEMP CORRECTED: 7.54 PH UNITS (ref 7.35–7.45)
PLATELET # BLD AUTO: 332 10*3/MM3 (ref 140–450)
PMV BLD AUTO: 11.3 FL (ref 6–12)
PO2 BLDA: 62.7 MM HG (ref 83–108)
PO2 TEMP ADJ BLD: 62.7 MM HG (ref 83–108)
POTASSIUM SERPL-SCNC: 3.1 MMOL/L (ref 3.5–5.2)
POTASSIUM SERPL-SCNC: 3.4 MMOL/L (ref 3.5–5.2)
POTASSIUM SERPL-SCNC: 3.7 MMOL/L (ref 3.5–5.2)
PROT SERPL-MCNC: 6.4 G/DL (ref 6–8.5)
RBC # BLD AUTO: 4.84 10*6/MM3 (ref 3.77–5.28)
SAO2 % BLDCOA: 93.8 % (ref 94–99)
SODIUM SERPL-SCNC: 151 MMOL/L (ref 136–145)
SODIUM SERPL-SCNC: 155 MMOL/L (ref 136–145)
SODIUM SERPL-SCNC: 160 MMOL/L (ref 136–145)
VENTILATOR MODE: ABNORMAL
WBC NRBC COR # BLD: 14.36 10*3/MM3 (ref 3.4–10.8)

## 2023-01-08 PROCEDURE — 25810000003 DEXTROSE 5 % WITH KCL 20 MEQ 20-5 MEQ/L-% SOLUTION: Performed by: FAMILY MEDICINE

## 2023-01-08 PROCEDURE — 94761 N-INVAS EAR/PLS OXIMETRY MLT: CPT

## 2023-01-08 PROCEDURE — 82803 BLOOD GASES ANY COMBINATION: CPT

## 2023-01-08 PROCEDURE — 99231 SBSQ HOSP IP/OBS SF/LOW 25: CPT | Performed by: SPECIALIST

## 2023-01-08 PROCEDURE — 25010000002 CEFTRIAXONE PER 250 MG: Performed by: INTERNAL MEDICINE

## 2023-01-08 PROCEDURE — 94799 UNLISTED PULMONARY SVC/PX: CPT

## 2023-01-08 PROCEDURE — 0 POTASSIUM CHLORIDE 10 MEQ/100ML SOLUTION: Performed by: SPECIALIST

## 2023-01-08 PROCEDURE — 94664 DEMO&/EVAL PT USE INHALER: CPT

## 2023-01-08 PROCEDURE — 74019 RADEX ABDOMEN 2 VIEWS: CPT

## 2023-01-08 PROCEDURE — 36600 WITHDRAWAL OF ARTERIAL BLOOD: CPT

## 2023-01-08 PROCEDURE — 74018 RADEX ABDOMEN 1 VIEW: CPT

## 2023-01-08 PROCEDURE — 80053 COMPREHEN METABOLIC PANEL: CPT | Performed by: SPECIALIST

## 2023-01-08 PROCEDURE — 85025 COMPLETE CBC W/AUTO DIFF WBC: CPT | Performed by: FAMILY MEDICINE

## 2023-01-08 RX ORDER — ECHINACEA PURPUREA EXTRACT 125 MG
2 TABLET ORAL AS NEEDED
Status: DISCONTINUED | OUTPATIENT
Start: 2023-01-08 | End: 2023-01-16 | Stop reason: HOSPADM

## 2023-01-08 RX ORDER — POTASSIUM CHLORIDE, DEXTROSE MONOHYDRATE 150; 5 MG/100ML; G/100ML
50 INJECTION, SOLUTION INTRAVENOUS CONTINUOUS
Status: DISCONTINUED | OUTPATIENT
Start: 2023-01-08 | End: 2023-01-09

## 2023-01-08 RX ORDER — POTASSIUM CHLORIDE 7.45 MG/ML
10 INJECTION INTRAVENOUS
Status: COMPLETED | OUTPATIENT
Start: 2023-01-08 | End: 2023-01-08

## 2023-01-08 RX ORDER — DEXTROSE MONOHYDRATE 50 MG/ML
250 INJECTION, SOLUTION INTRAVENOUS CONTINUOUS
Status: DISPENSED | OUTPATIENT
Start: 2023-01-08 | End: 2023-01-08

## 2023-01-08 RX ADMIN — POTASSIUM CHLORIDE 10 MEQ: 7.46 INJECTION, SOLUTION INTRAVENOUS at 10:21

## 2023-01-08 RX ADMIN — IPRATROPIUM BROMIDE 0.5 MG: 0.5 SOLUTION RESPIRATORY (INHALATION) at 14:55

## 2023-01-08 RX ADMIN — IPRATROPIUM BROMIDE 0.5 MG: 0.5 SOLUTION RESPIRATORY (INHALATION) at 10:28

## 2023-01-08 RX ADMIN — METOPROLOL TARTRATE 5 MG: 5 INJECTION INTRAVENOUS at 05:44

## 2023-01-08 RX ADMIN — POTASSIUM CHLORIDE AND DEXTROSE MONOHYDRATE 75 ML/HR: 150; 5 INJECTION, SOLUTION INTRAVENOUS at 11:46

## 2023-01-08 RX ADMIN — IPRATROPIUM BROMIDE 0.5 MG: 0.5 SOLUTION RESPIRATORY (INHALATION) at 19:09

## 2023-01-08 RX ADMIN — DEXTROSE AND SODIUM CHLORIDE 75 ML/HR: 5; 450 INJECTION, SOLUTION INTRAVENOUS at 03:41

## 2023-01-08 RX ADMIN — IPRATROPIUM BROMIDE 0.5 MG: 0.5 SOLUTION RESPIRATORY (INHALATION) at 06:58

## 2023-01-08 RX ADMIN — METOPROLOL TARTRATE 5 MG: 5 INJECTION INTRAVENOUS at 11:09

## 2023-01-08 RX ADMIN — SALINE NASAL SPRAY 2 SPRAY: 1.5 SOLUTION NASAL at 16:34

## 2023-01-08 RX ADMIN — POTASSIUM CHLORIDE 10 MEQ: 7.46 INJECTION, SOLUTION INTRAVENOUS at 09:33

## 2023-01-08 RX ADMIN — POTASSIUM CHLORIDE 10 MEQ: 7.46 INJECTION, SOLUTION INTRAVENOUS at 11:46

## 2023-01-08 RX ADMIN — POTASSIUM CHLORIDE 10 MEQ: 7.46 INJECTION, SOLUTION INTRAVENOUS at 11:08

## 2023-01-08 RX ADMIN — METOPROLOL TARTRATE 5 MG: 5 INJECTION INTRAVENOUS at 17:12

## 2023-01-08 RX ADMIN — ALBUTEROL SULFATE 1.25 MG: 2.5 SOLUTION RESPIRATORY (INHALATION) at 06:58

## 2023-01-08 RX ADMIN — CEFTRIAXONE 1 G: 1 INJECTION, POWDER, FOR SOLUTION INTRAMUSCULAR; INTRAVENOUS at 20:59

## 2023-01-08 RX ADMIN — DEXTROSE MONOHYDRATE 250 ML/HR: 50 INJECTION, SOLUTION INTRAVENOUS at 08:47

## 2023-01-08 RX ADMIN — Medication 10 ML: at 20:59

## 2023-01-08 RX ADMIN — ALBUTEROL SULFATE 1.25 MG: 2.5 SOLUTION RESPIRATORY (INHALATION) at 10:28

## 2023-01-08 RX ADMIN — POTASSIUM CHLORIDE 10 MEQ: 7.46 INJECTION, SOLUTION INTRAVENOUS at 08:47

## 2023-01-08 RX ADMIN — ALBUTEROL SULFATE 1.25 MG: 2.5 SOLUTION RESPIRATORY (INHALATION) at 19:09

## 2023-01-08 RX ADMIN — ALBUTEROL SULFATE 1.25 MG: 2.5 SOLUTION RESPIRATORY (INHALATION) at 14:55

## 2023-01-08 NOTE — PLAN OF CARE
Goal Outcome Evaluation:  Plan of Care Reviewed With: patient        Progress: no change  Outcome Evaluation: BP has remained elevated.  Lopressor given as scheduled.  Remains on 6L O2.  No c/o or indication of pain.  Frequent requests for drinks of water d/t dry throat.  Oral care provided.  NG tube position maintained; moderate output.  Restraints still in place; pt immediately grabs at tubing when restraints are undone.  Turned q2h.  Iv continues to infuse.  IV ABX administered.  Purewick applied d/t incontinence.  Safety maintained. Sinus 70-90 on tele.

## 2023-01-08 NOTE — PROGRESS NOTES
LOS: 3 days   Patient Care Team:  Provider, No Known as PCP - General    Chief Complaint: Small bowel obstruction, Hospital day #3  Subjective     Subjective     Small bowel obstruction Hospital day #3, she is much less distended she is still disoriented, her sodium is increased to 160, her kidney function has improved but sodium has increased with several boluses, she is passing gas, and the small bowel distention looks much improved.  Objective      Objective     Vital Signs  Temp:  [97.4 °F (36.3 °C)-98.5 °F (36.9 °C)] 97.5 °F (36.4 °C)  Heart Rate:  [67-95] 90  Resp:  [16-26] 18  BP: (157-182)/(70-90) 157/90    Intake & Output (last 3 days)       01/05 0701 01/06 0700 01/06 0701 01/07 0700 01/07 0701 01/08 0700 01/08 0701 01/09 0700    Urine (mL/kg/hr)  0 (0) 500 (0.4) 600 (2)    Emesis/NG output  1800 1960 600    Stool  0      Total Output  1800 2460 1200    Net  -1800 -2460 -1200            Urine Unmeasured Occurrence  8 x 3 x     Stool Unmeasured Occurrence  2 x            Physical Exam:     General Appearance:    Alert, cooperative, in no acute distress   Lungs:     Clear to auscultation,respirations regular, even and                  unlabored    Heart:    Regular rhythm and normal rate, normal S1 and S2, no            murmur, no gallop, no rub, no click   Chest Wall:    No abnormalities observed   Abdomen:    Soft abdomen, occasional bowel sounds nontender no masses White count is reduced to 14, hematocrit 43 with hydration, sodium is elevated at 151, creatinine is down to 1.7 on admission 4.0.  KUB shows small bowel distention but greatly reduced.        Results Review:     I reviewed the patient's new clinical results.  I reviewed the patient's new imaging results and agree with the interpretation.    Results from last 7 days   Lab Units 01/08/23  0323 01/07/23  0444 01/06/23  0904   WBC 10*3/mm3 14.36* 16.14* 11.53*   HEMOGLOBIN g/dL 13.1 13.3 14.7   HEMATOCRIT % 43.7 44.8 47.1*   PLATELETS  10*3/mm3 332 306 354        Results from last 7 days   Lab Units 01/08/23  1228 01/08/23  0323 01/07/23  0444 01/06/23  1253 01/06/23  0904   SODIUM mmol/L 151* 160* 152*   < > 140   POTASSIUM mmol/L 3.7 3.1* 4.0   < > 5.7*   CHLORIDE mmol/L 101 105 105   < > 94*   CO2 mmol/L 40.0* 44.0* 34.0*   < > 32.0*   BUN mg/dL 55* 64* 91*   < > 100*   CREATININE mg/dL 1.73* 1.87* 2.87*   < > 4.01*   CALCIUM mg/dL 9.2 9.2 9.1   < > 10.1   BILIRUBIN mg/dL  --  0.2 0.3  --  0.3   ALK PHOS U/L  --  68 67  --  82   ALT (SGPT) U/L  --  12 15  --  21   AST (SGOT) U/L  --  14 17  --  22   GLUCOSE mg/dL 212* 154* 121*   < > 172*    < > = values in this interval not displayed.       Assessment/Plan     Assessment & Plan       SBO (small bowel obstruction) (HCC)    Essential hypertension    LETITIA (acute kidney injury) ([facility])    Stage 3b chronic kidney disease (CKD) (HCC)    Left lower lobe pneumonia    Hyperkalemia    Chronic combined systolic (congestive) and diastolic (congestive) heart failure (HCC)      Currently plan to give more free water to treat the elevated sodium potassium is being replaced, creatinine is improving and is down to 1.7 from as high as 4.0 on admission, we will look toward a small bowel follow-through on Monday to assure no obstruction remove NG tube begin tube feedings are p.o. feedings at that time.  Potentially to the nursing home on Tuesday or Wednesday if the plan continues.      Tam Gabriel MD  01/08/23  12:58 CST      Time: time spent with patient 15 minutes     Part of this note may be an electronic transcription/translation of spoken language to printed text using the Dragon Dictation System.

## 2023-01-08 NOTE — PROGRESS NOTES
[facility] Medicine Services  INPATIENT PROGRESS NOTE    Patient Name: Susana Delcid  Date of Admission: 1/5/2023  Today's Date: 01/08/23  Length of Stay: 3  Primary Care Physician: Provider, No Known    Subjective   Chief Complaint: Pneumonia/shortness of breath/bowel obstruction/renal failure    HPI   Patient is currently on 6 L of oxygen.  Hyponatremia is improving with D5 water.  Hypokalemia resolved.  Acute renal failure is improving slowly.  Patient is more alert awake today.    Review of Systems   Unable to do obtain due to dementia and altered mental status.  Otherwise as stated above.  All pertinent negatives and positives are as above. All other systems have been reviewed and are negative unless otherwise stated.     Objective    Temp:  [97.4 °F (36.3 °C)-98.5 °F (36.9 °C)] 97.5 °F (36.4 °C)  Heart Rate:  [67-95] 90  Resp:  [16-26] 18  BP: (157-182)/(70-90) 157/90  Physical Exam  Vitals and nursing note reviewed.   Constitutional:       Comments: Cachectic.  Chronically ill.  Advanced age.   HENT:      Head: Normocephalic and atraumatic.   Eyes:      Conjunctiva/sclera: Conjunctivae normal.      Pupils: Pupils are equal, round, and reactive to light.   Neck:      Vascular: No JVD.   Cardiovascular:      Rate and Rhythm: Normal rate and regular rhythm.      Heart sounds: Normal heart sounds.   Pulmonary:      Effort: No respiratory distress.      Breath sounds: No wheezing or rales.      Comments: Diminished breath sound bilateral, clear, on 6 L of oxygen  Chest:      Chest wall: No tenderness.   Abdominal:      General: Bowel sounds are normal. There is no distension.      Palpations: Abdomen is soft.      Tenderness: There is no abdominal tenderness.   Musculoskeletal:         General: No tenderness or deformity.      Cervical back: Neck supple.   Skin:     General: Skin is warm and dry.      Capillary Refill: Capillary refill takes 2 to 3 seconds.      Findings: No  rash.   Neurological:      Cranial Nerves: No cranial nerve deficit.      Motor: Weakness present. No abnormal muscle tone.      Coordination: Coordination abnormal.      Gait: Gait abnormal.      Deep Tendon Reflexes: Reflexes normal.           Results Review:  I have reviewed the labs, radiology results, and diagnostic studies.    Laboratory Data:   Results from last 7 days   Lab Units 01/08/23  0323 01/07/23  0444 01/06/23  0904   WBC 10*3/mm3 14.36* 16.14* 11.53*   HEMOGLOBIN g/dL 13.1 13.3 14.7   HEMATOCRIT % 43.7 44.8 47.1*   PLATELETS 10*3/mm3 332 306 354        Results from last 7 days   Lab Units 01/08/23  1228 01/08/23  0323 01/07/23  0444 01/06/23  1253 01/06/23  0904   SODIUM mmol/L 151* 160* 152*   < > 140   POTASSIUM mmol/L 3.7 3.1* 4.0   < > 5.7*   CHLORIDE mmol/L 101 105 105   < > 94*   CO2 mmol/L 40.0* 44.0* 34.0*   < > 32.0*   BUN mg/dL 55* 64* 91*   < > 100*   CREATININE mg/dL 1.73* 1.87* 2.87*   < > 4.01*   CALCIUM mg/dL 9.2 9.2 9.1   < > 10.1   BILIRUBIN mg/dL  --  0.2 0.3  --  0.3   ALK PHOS U/L  --  68 67  --  82   ALT (SGPT) U/L  --  12 15  --  21   AST (SGOT) U/L  --  14 17  --  22   GLUCOSE mg/dL 212* 154* 121*   < > 172*    < > = values in this interval not displayed.       Culture Data:   Blood Culture   Date Value Ref Range Status   01/05/2023 No growth at 2 days  Preliminary   01/05/2023 No growth at 2 days  Preliminary       Radiology Data:   Imaging Results (Last 24 Hours)     Procedure Component Value Units Date/Time    XR Abdomen Flat & Upright [[id number]] Collected: 01/08/23 0838     Updated: 01/08/23 0844    Narrative:      EXAM/TECHNIQUE: XR ABDOMEN FLAT AND UPRIGHT-     INDICATION: Small bowel obstruction; K56.609-Unspecified intestinal  obstruction, unspecified as to partial versus complete obstruction     COMPARISON: 01/07/2023     FINDINGS:     Slight decrease in diffuse small bowel distention with loops measuring  up to 3.5 cm diameter (previously 4 cm). Multiple bowel loops  in the  LEFT abdomen that were previously dilated, no longer appear dilated. No  visible pneumatosis or portal venous gas. Enteric tube is in the mid  stomach. LEFT lower lobe consolidation is again noted. Degenerative  change in the lumbar spine. Multiple pelvic embolus and atherosclerotic  vascular calcifications.       Impression:         Slight decrease in persistent small bowel distention.  This report was finalized on 01/08/2023 08:40 by Dr. Zan Cantor MD.    XR Abdomen Flat & Upright [[id number]] Collected: 01/07/23 1452     Updated: 01/07/23 1457    Narrative:      EXAM/TECHNIQUE: XR ABDOMEN FLAT AND UPRIGHT-     INDICATION: Follow-up small bowel obstruction KUB flat and upright;  K56.609-Unspecified intestinal obstruction, unspecified as to partial  versus complete obstruction     COMPARISON: 01/06/2023     FINDINGS:     Enteric tube is in the mid stomach. Gaseous bowel distention persists  but appears decreased. Small bowel loops measure up to 4 cm (previously  5.5 cm). No visible pneumatosis or portal venous gas. Moderate to large  volume stool in the rectum. Redemonstrated LEFT lower lobe  consolidation. No acute osseous finding.       Impression:         1.  Persistent but decreased gaseous bowel distention.  2.  Feeding tube tip is in the mid stomach.  This report was finalized on 01/07/2023 14:54 by Dr. Zan Cantor MD.          I have reviewed the patient's current medications.     Assessment/Plan   Assessment  Active Hospital Problems    Diagnosis    • **SBO (small bowel obstruction) (HCC)    • LETITIA (acute kidney injury) ([facility])    • Stage 3b chronic kidney disease (CKD) ([facility])    • Left lower lobe pneumonia    • Hyperkalemia    • Chronic combined systolic (congestive) and diastolic (congestive) heart failure ([facility])    • Essential hypertension        Treatment Plan  Patient is a 82-year-old female who presents to [facility] emergency department from [facility].  Patient  has history of dementia, hyperlipidemia, hypertension.  This list is not at all exclusive as full history is limited at this time with patient being a poor historian. She presents with chief complaint of nausea and vomiting, the patient has abdominal distention, NG tube placed in emergency department.  CT scan abdomen shows small bowel obstruction.  Patient initially tachycardic and hypoxic requiring Vapotherm nasal cannula supplementation.  Creatinine on presentation 3.4, creatinine of 1.35 and September 2021.  GFR on presentation was 13, GFR in September 2021 was 38.     Small bowel obstruction- per general surgery.    NG tube in place.  NG tube initially placed in stomach and hooked to low intermittent suction.  Patient pulled NG tube out.  Plan to reinstate NG tube.  Dr. Gabriel with general surgery following.    KUB-Feeding tube tip located in the upper gastric body with sidehole remaining at the gastroesophageal junction- Additional slight advancement is recommended, perhaps 5 cm.     Acute kidney injury superimposed on stage 3b chronic kidney disease-  Creatinine 3.4, BUN 83, GFR 13.1 on arrival.  These values are worsened from baseline of stage IIIb chronic kidney disease.  Creatinine 1.35 and GFR 38 in September 2021.    Creatinine is improving today slowly.    BNP improving.  GFR is improving.     Hypernatremia.    Cut back change fluids to D5 water 20 KCl from 75 to 60 cc an hour.  Patient received D5 water 250 cc over 4 hours.     Left lower lobe pneumonia- leukocytosis, improving  WBC 16, afebrile.   Azithromycin and Rocephin IV.  Chest x-ray-Left lower lobe consolidation suggesting a infiltrate, small left effusion is present, Lungs are otherwise clear.  Patient is currently on 6 L of oxygen.     Chronic combined systolic and diastolic heart failure/hypertension-  Echocardiogram from January 2018 shows EF 31 to 35% with diastolic dysfunction.  We will be mindful of this with IV hydration.  We will hold  home Lasix for now.    Hold Coreg because patient is n.p.o . Lopressor IV for now.     Hypokalemia- resolved.     Palliative care consulted.     Patient is a marquez of the [facility] and a full code.     Blood culture-no growth in 2 days.     Medical Decision Making  Number and Complexity of problems: 2 high complexity problems in acute kidney injury and small bowel obstruction.  3 moderate complexity problems in pneumonia and chronic combined systolic and diastolic heart failure and hypokalemia and hypernatremia  Differential Diagnosis: None     Conditions and Status  Small bowel obstruction stable  Acute kidney injury improving  Left lower lobe pneumonia stable  Chronic combined systolic and diastolic heart failure requiring more oxygen  Hypokalemia resolved.  Hypernatremia improving.        [facility] Data  External documents reviewed: Echocardiogram from 2018  Cardiac tracing (EKG, telemetry) interpretation: Telemetry shows trigeminy  Radiology interpretation: Reviewed radiologist interpretation of chest x-ray/KUB  Labs reviewed: Arterial blood gas, CMP, CBC with differential, urinalysis  Any tests that were considered but not ordered: Echocardiogram     Decision rules/scores evaluated (example [id number], Wells, etc): None     Discussed with: Patient, although I do not believe she understands.     Care Planning  Shared decision making: Discussed plan of care with patient in the presence of cognitive disability.  Code status and discussions: Full code, patient is a marquez of the [facility].     Disposition  Social Determinants of Health that impact treatment or disposition: Patient resides at a skilled nursing facility  2 to 6 days.    Electronically signed by Claudio Cobb MD, 01/08/23, 13:29 CST.

## 2023-01-09 ENCOUNTER — APPOINTMENT (OUTPATIENT)
Dept: GENERAL RADIOLOGY | Facility: HOSPITAL | Age: 83
DRG: 388 | End: 2023-01-09
Payer: MEDICARE

## 2023-01-09 PROBLEM — J96.01 ACUTE RESPIRATORY FAILURE WITH HYPOXIA (HCC): Status: ACTIVE | Noted: 2023-01-09

## 2023-01-09 LAB
ANION GAP SERPL CALCULATED.3IONS-SCNC: 7 MMOL/L (ref 5–15)
ANION GAP SERPL CALCULATED.3IONS-SCNC: 7 MMOL/L (ref 5–15)
ANION GAP SERPL CALCULATED.3IONS-SCNC: 9 MMOL/L (ref 5–15)
ANION GAP SERPL CALCULATED.3IONS-SCNC: 9 MMOL/L (ref 5–15)
BASOPHILS # BLD AUTO: 0.04 10*3/MM3 (ref 0–0.2)
BASOPHILS NFR BLD AUTO: 0.3 % (ref 0–1.5)
BUN SERPL-MCNC: 44 MG/DL (ref 8–23)
BUN SERPL-MCNC: 46 MG/DL (ref 8–23)
BUN SERPL-MCNC: 46 MG/DL (ref 8–23)
BUN SERPL-MCNC: 49 MG/DL (ref 8–23)
BUN/CREAT SERPL: 27.7 (ref 7–25)
BUN/CREAT SERPL: 28 (ref 7–25)
BUN/CREAT SERPL: 29.9 (ref 7–25)
BUN/CREAT SERPL: 29.9 (ref 7–25)
CALCIUM SPEC-SCNC: 9.3 MG/DL (ref 8.6–10.5)
CALCIUM SPEC-SCNC: 9.3 MG/DL (ref 8.6–10.5)
CALCIUM SPEC-SCNC: 9.7 MG/DL (ref 8.6–10.5)
CALCIUM SPEC-SCNC: 9.7 MG/DL (ref 8.6–10.5)
CHLORIDE SERPL-SCNC: 100 MMOL/L (ref 98–107)
CHLORIDE SERPL-SCNC: 101 MMOL/L (ref 98–107)
CHLORIDE SERPL-SCNC: 103 MMOL/L (ref 98–107)
CHLORIDE SERPL-SCNC: 104 MMOL/L (ref 98–107)
CO2 SERPL-SCNC: 44 MMOL/L (ref 22–29)
CO2 SERPL-SCNC: 45 MMOL/L (ref 22–29)
CO2 SERPL-SCNC: 46 MMOL/L (ref 22–29)
CO2 SERPL-SCNC: 48 MMOL/L (ref 22–29)
CREAT SERPL-MCNC: 1.47 MG/DL (ref 0.57–1)
CREAT SERPL-MCNC: 1.54 MG/DL (ref 0.57–1)
CREAT SERPL-MCNC: 1.66 MG/DL (ref 0.57–1)
CREAT SERPL-MCNC: 1.75 MG/DL (ref 0.57–1)
DEPRECATED RDW RBC AUTO: 51.2 FL (ref 37–54)
EGFRCR SERPLBLD CKD-EPI 2021: 29 ML/MIN/1.73
EGFRCR SERPLBLD CKD-EPI 2021: 30.9 ML/MIN/1.73
EGFRCR SERPLBLD CKD-EPI 2021: 33.8 ML/MIN/1.73
EGFRCR SERPLBLD CKD-EPI 2021: 35.7 ML/MIN/1.73
EOSINOPHIL # BLD AUTO: 0.02 10*3/MM3 (ref 0–0.4)
EOSINOPHIL NFR BLD AUTO: 0.1 % (ref 0.3–6.2)
ERYTHROCYTE [DISTWIDTH] IN BLOOD BY AUTOMATED COUNT: 15 % (ref 12.3–15.4)
GLUCOSE SERPL-MCNC: 131 MG/DL (ref 65–99)
GLUCOSE SERPL-MCNC: 135 MG/DL (ref 65–99)
GLUCOSE SERPL-MCNC: 138 MG/DL (ref 65–99)
GLUCOSE SERPL-MCNC: 180 MG/DL (ref 65–99)
HCT VFR BLD AUTO: 44.8 % (ref 34–46.6)
HGB BLD-MCNC: 13.3 G/DL (ref 12–15.9)
IMM GRANULOCYTES # BLD AUTO: 0.13 10*3/MM3 (ref 0–0.05)
IMM GRANULOCYTES NFR BLD AUTO: 0.9 % (ref 0–0.5)
LYMPHOCYTES # BLD AUTO: 1.17 10*3/MM3 (ref 0.7–3.1)
LYMPHOCYTES NFR BLD AUTO: 8.5 % (ref 19.6–45.3)
MCH RBC QN AUTO: 27.4 PG (ref 26.6–33)
MCHC RBC AUTO-ENTMCNC: 29.7 G/DL (ref 31.5–35.7)
MCV RBC AUTO: 92.2 FL (ref 79–97)
MONOCYTES # BLD AUTO: 0.95 10*3/MM3 (ref 0.1–0.9)
MONOCYTES NFR BLD AUTO: 6.9 % (ref 5–12)
NEUTROPHILS NFR BLD AUTO: 11.52 10*3/MM3 (ref 1.7–7)
NEUTROPHILS NFR BLD AUTO: 83.3 % (ref 42.7–76)
NRBC BLD AUTO-RTO: 0 /100 WBC (ref 0–0.2)
PLATELET # BLD AUTO: 298 10*3/MM3 (ref 140–450)
PMV BLD AUTO: 11.3 FL (ref 6–12)
POTASSIUM SERPL-SCNC: 3.2 MMOL/L (ref 3.5–5.2)
POTASSIUM SERPL-SCNC: 3.3 MMOL/L (ref 3.5–5.2)
POTASSIUM SERPL-SCNC: 3.3 MMOL/L (ref 3.5–5.2)
POTASSIUM SERPL-SCNC: 3.4 MMOL/L (ref 3.5–5.2)
RBC # BLD AUTO: 4.86 10*6/MM3 (ref 3.77–5.28)
SARS-COV-2 RNA RESP QL NAA+PROBE: NOT DETECTED
SODIUM SERPL-SCNC: 153 MMOL/L (ref 136–145)
SODIUM SERPL-SCNC: 153 MMOL/L (ref 136–145)
SODIUM SERPL-SCNC: 158 MMOL/L (ref 136–145)
SODIUM SERPL-SCNC: 159 MMOL/L (ref 136–145)
URATE SERPL-MCNC: 9.5 MG/DL (ref 2.4–5.7)
WBC NRBC COR # BLD: 13.83 10*3/MM3 (ref 3.4–10.8)

## 2023-01-09 PROCEDURE — 92610 EVALUATE SWALLOWING FUNCTION: CPT

## 2023-01-09 PROCEDURE — 97530 THERAPEUTIC ACTIVITIES: CPT

## 2023-01-09 PROCEDURE — 94799 UNLISTED PULMONARY SVC/PX: CPT

## 2023-01-09 PROCEDURE — 74018 RADEX ABDOMEN 1 VIEW: CPT

## 2023-01-09 PROCEDURE — 25010000002 FUROSEMIDE PER 20 MG: Performed by: FAMILY MEDICINE

## 2023-01-09 PROCEDURE — 25810000003 DEXTROSE 5 % WITH KCL 20 MEQ 20-5 MEQ/L-% SOLUTION: Performed by: FAMILY MEDICINE

## 2023-01-09 PROCEDURE — 25010000002 AZITHROMYCIN PER 500 MG: Performed by: INTERNAL MEDICINE

## 2023-01-09 PROCEDURE — 74250 X-RAY XM SM INT 1CNTRST STD: CPT

## 2023-01-09 PROCEDURE — 25010000002 CHLOROTHIAZIDE PER 500 MG: Performed by: INTERNAL MEDICINE

## 2023-01-09 PROCEDURE — 80048 BASIC METABOLIC PNL TOTAL CA: CPT | Performed by: FAMILY MEDICINE

## 2023-01-09 PROCEDURE — 85025 COMPLETE CBC W/AUTO DIFF WBC: CPT | Performed by: FAMILY MEDICINE

## 2023-01-09 PROCEDURE — 71045 X-RAY EXAM CHEST 1 VIEW: CPT

## 2023-01-09 PROCEDURE — 25010000002 CEFTRIAXONE PER 250 MG: Performed by: INTERNAL MEDICINE

## 2023-01-09 PROCEDURE — 94761 N-INVAS EAR/PLS OXIMETRY MLT: CPT

## 2023-01-09 PROCEDURE — 97162 PT EVAL MOD COMPLEX 30 MIN: CPT

## 2023-01-09 PROCEDURE — 87635 SARS-COV-2 COVID-19 AMP PRB: CPT | Performed by: FAMILY MEDICINE

## 2023-01-09 PROCEDURE — 84550 ASSAY OF BLOOD/URIC ACID: CPT | Performed by: INTERNAL MEDICINE

## 2023-01-09 PROCEDURE — 97165 OT EVAL LOW COMPLEX 30 MIN: CPT | Performed by: OCCUPATIONAL THERAPIST

## 2023-01-09 PROCEDURE — 25010000002 POTASSIUM CHLORIDE PER 2 MEQ OF POTASSIUM: Performed by: INTERNAL MEDICINE

## 2023-01-09 PROCEDURE — 99231 SBSQ HOSP IP/OBS SF/LOW 25: CPT | Performed by: SPECIALIST

## 2023-01-09 RX ORDER — FUROSEMIDE 10 MG/ML
20 INJECTION INTRAMUSCULAR; INTRAVENOUS ONCE
Status: COMPLETED | OUTPATIENT
Start: 2023-01-09 | End: 2023-01-09

## 2023-01-09 RX ORDER — CHLOROTHIAZIDE SODIUM 500 MG/1
500 INJECTION INTRAVENOUS DAILY
Status: DISCONTINUED | OUTPATIENT
Start: 2023-01-09 | End: 2023-01-10

## 2023-01-09 RX ADMIN — IPRATROPIUM BROMIDE 0.5 MG: 0.5 SOLUTION RESPIRATORY (INHALATION) at 10:30

## 2023-01-09 RX ADMIN — POTASSIUM CHLORIDE: 2 INJECTION, SOLUTION, CONCENTRATE INTRAVENOUS at 18:11

## 2023-01-09 RX ADMIN — IPRATROPIUM BROMIDE 0.5 MG: 0.5 SOLUTION RESPIRATORY (INHALATION) at 20:05

## 2023-01-09 RX ADMIN — Medication 10 ML: at 21:25

## 2023-01-09 RX ADMIN — IPRATROPIUM BROMIDE 0.5 MG: 0.5 SOLUTION RESPIRATORY (INHALATION) at 14:53

## 2023-01-09 RX ADMIN — AZITHROMYCIN MONOHYDRATE 500 MG: 500 INJECTION, POWDER, LYOPHILIZED, FOR SOLUTION INTRAVENOUS at 00:43

## 2023-01-09 RX ADMIN — CHLOROTHIAZIDE SODIUM 500 MG: 500 INJECTION, POWDER, LYOPHILIZED, FOR SOLUTION INTRAVENOUS at 18:33

## 2023-01-09 RX ADMIN — ALBUTEROL SULFATE 1.25 MG: 2.5 SOLUTION RESPIRATORY (INHALATION) at 20:05

## 2023-01-09 RX ADMIN — FUROSEMIDE 20 MG: 10 INJECTION, SOLUTION INTRAMUSCULAR; INTRAVENOUS at 15:22

## 2023-01-09 RX ADMIN — ALBUTEROL SULFATE 1.25 MG: 2.5 SOLUTION RESPIRATORY (INHALATION) at 14:53

## 2023-01-09 RX ADMIN — METOPROLOL TARTRATE 5 MG: 5 INJECTION INTRAVENOUS at 12:55

## 2023-01-09 RX ADMIN — POTASSIUM CHLORIDE AND DEXTROSE MONOHYDRATE 50 ML/HR: 150; 5 INJECTION, SOLUTION INTRAVENOUS at 10:03

## 2023-01-09 RX ADMIN — IPRATROPIUM BROMIDE 0.5 MG: 0.5 SOLUTION RESPIRATORY (INHALATION) at 07:13

## 2023-01-09 RX ADMIN — ALBUTEROL SULFATE 1.25 MG: 2.5 SOLUTION RESPIRATORY (INHALATION) at 10:31

## 2023-01-09 RX ADMIN — METOPROLOL TARTRATE 5 MG: 5 INJECTION INTRAVENOUS at 18:11

## 2023-01-09 RX ADMIN — METOPROLOL TARTRATE 5 MG: 5 INJECTION INTRAVENOUS at 06:17

## 2023-01-09 RX ADMIN — METOPROLOL TARTRATE 5 MG: 5 INJECTION INTRAVENOUS at 00:43

## 2023-01-09 RX ADMIN — ALBUTEROL SULFATE 1.25 MG: 2.5 SOLUTION RESPIRATORY (INHALATION) at 07:13

## 2023-01-09 NOTE — THERAPY EVALUATION
Patient Name: Susana Delcid  : 1940    MRN: [id number]                              Today's Date: 2023       Admit Date: 2023    Visit Dx:     ICD-10-CM ICD-9-CM   1. Small bowel obstruction (HCC)  K56.609 560.9   2. Decreased activities of daily living (ADL)  Z78.9 V49.89     Patient Active Problem List   Diagnosis   • Acute systolic congestive heart failure (HCC)   • Elevated troponin level   • Iron deficiency anemia   • Tachycardia   • Elevated d-dimer   • UTI (urinary tract infection)   • Essential hypertension   • Mixed hyperlipidemia   • Pleural effusion, bilateral   • SBO (small bowel obstruction) (HCC)   • LETITIA (acute kidney injury) (HCC)   • Stage 3b chronic kidney disease (CKD) (HCC)   • Left lower lobe pneumonia   • Hyperkalemia   • Chronic combined systolic (congestive) and diastolic (congestive) heart failure (HCC)     Past Medical History:   Diagnosis Date   • Hyperlipidemia    • Hypertension      Past Surgical History:   Procedure Laterality Date   • APPENDECTOMY     • HYSTERECTOMY     • TONSILLECTOMY     • WRIST SURGERY Right       General Information     Row Name 23 1007          OT Time and Intention    Document Type evaluation  Pt presented to ED with nausea and vomiting. Hx of dementia, residing at NH. Dx: SBO, LETITIA superimposed on CKD, pneumonia LLL  -JJ     Mode of Treatment occupational therapy  -JJ     Row Name 23 1007          General Information    Patient Profile Reviewed yes  -JJ     Prior Level of Function --  unsure of PLOF d/t cognitive status. Resides at [facility].  -JJ     Existing Precautions/Restrictions oxygen therapy device and L/min;fall  NG tube  -JJ     Barriers to Rehab medically complex;cognitive status;hearing deficit  -JJ     Row Name 23 1007          Living Environment    People in Home facility resident  -JJ     Row Name 23 1007          Home Main Entrance    Number of Stairs, Main Entrance none  -JJ     Stair Railings, Main Entrance  none  -JJ     Row Name 01/09/23 1007          Stairs Within Home, Primary    Number of Stairs, Within Home, Primary none  -JJ     Stair Railings, Within Home, Primary none  -JJ     Row Name 01/09/23 1007          Cognition    Orientation Status (Cognition) oriented to;person  responds to name, minimal verbalizations throughout session.  -     Row Name 01/09/23 1007          Safety Issues, Functional Mobility    Safety Issues Affecting Function (Mobility) ability to follow commands;at risk behavior observed;awareness of need for assistance;impulsivity;insight into deficits/self-awareness;judgment;problem-solving;safety precaution awareness;safety precautions follow-through/compliance  -     Impairments Affecting Function (Mobility) balance;cognition;endurance/activity tolerance;shortness of breath;strength  -     Cognitive Impairments, Mobility Safety/Performance attention;awareness, need for assistance;impulsivity;insight into deficits/self-awareness;judgment;problem-solving/reasoning;safety precaution awareness;safety precaution follow-through  -           User Key  (r) = Recorded By, (t) = Taken By, (c) = Cosigned By    Initials Name Provider Type    Mindy Lopez, OTR/L, CSRS Occupational Therapist                 Mobility/ADL's     Row Name 01/09/23 1007          Bed Mobility    Bed Mobility rolling right;rolling left;scooting/bridging;supine-sit;sit-supine  -     Rolling Left [place] (Bed Mobility) minimum assist (75% patient effort);verbal cues;nonverbal cues (demo/gesture)  -J     Rolling Right [place] (Bed Mobility) minimum assist (75% patient effort);nonverbal cues (demo/gesture);verbal cues  -J     Scooting/Bridging [place] (Bed Mobility) maximum assist (25% patient effort);2 person assist  -     Supine-Sit [place] (Bed Mobility) moderate assist (50% patient effort);maximum assist (25% patient effort);2 person assist  -JJ     Sit-Supine [place] (Bed Mobility)  maximum assist (25% patient effort);2 person assist  -     Bed Mobility, Safety Issues cognitive deficits limit understanding;decreased use of arms for pushing/pulling;decreased use of legs for bridging/pushing  -     Assistive Device (Bed Mobility) bed rails;draw sheet;head of bed elevated  -[facility] Name 01/09/23 1007          Transfers    Transfers sit-stand transfer;stand-sit transfer  -[facility] Name 01/09/23 1007          Sit-Stand Transfer    Sit-Stand [place] (Transfers) minimum assist (75% patient effort);2 person assist  -[facility] Name 01/09/23 1007          Stand-Sit Transfer    Stand-Sit [place] (Transfers) minimum assist (75% patient effort);2 person assist  -[facility] Name 01/09/23 1007          Functional Mobility    Functional Mobility- Comment not appropriate at this time to attempt.  -[facility] Name 01/09/23 1007          Activities of Daily Living    BADL Assessment/Intervention toileting  -[facility] Name 01/09/23 1007          Toileting Assessment/Training    [place] Level (Toileting) adjust/manage clothing;change pad/brief;perform perineal hygiene;maximum assist (25% patient effort);verbal cues;nonverbal cues (demo/gesture)  -     Position (Toileting) supine  -     Comment, (Toileting) found incontinent of urine.  -           User Key  (r) = Recorded By, (t) = Taken By, (c) = Cosigned By    Initials Name Provider Type     Mindy Griffin, MICHAEL/L, CSRS Occupational Therapist               Obj/Interventions     Row Name 01/09/23 1007          Sensory Assessment (Somatosensory)    Sensory Assessment (Somatosensory) unable/difficult to assess  -[facility] Name 01/09/23 1007          Vision Assessment/Intervention    Visual Impairment/Limitations unable/difficult to assess  -     Vision Assessment Comment but was able to attend to L/R side of enviornment and track therapist around room  -[facility] Name 01/09/23 1007          Range of Motion Comprehensive     General Range of Motion bilateral upper extremity ROM WFL  -JJ     Comment, General Range of Motion PROM WFL, cognitive deficits limited pts ability to complete formal assessment, but grossly AROM WFL.  -JJ     Row Name 01/09/23 1007          Strength Comprehensive (MMT)    Comment, General Manual Muscle Testing (MMT) Assessment cognitive deficits limit pts ability to complete formal assessment, but pt was able to pull/push self to assist with bed mobility demonstrating a functional strength of 4-/5  -JJ     Row Name 01/09/23 1007          Balance    Balance Assessment sitting static balance;sitting dynamic balance;standing static balance  -JJ     Static Sitting Balance moderate assist  -JJ     Dynamic Sitting Balance moderate assist;maximum assist  -JJ     Position, Sitting Balance supported;sitting edge of bed  -JJ     Static Standing Balance minimal assist;2-person assist  -JJ     Position/Device Used, Standing Balance supported  -JJ           User Key  (r) = Recorded By, (t) = Taken By, (c) = Cosigned By    Initials Name Provider Type    JMindy Sapp, OTR/L, CSRS Occupational Therapist               Goals/Plan     Row Name 01/09/23 1007          Dressing Goal 1 (OT)    Activity/Device (Dressing Goal 1, OT) upper body dressing  -JJ     [place]/Cues Needed (Dressing Goal 1, OT) minimum assist (75% or more patient effort)  -JJ     Time Frame (Dressing Goal 1, OT) long term goal (LTG);by discharge  -JJ     Progress/Outcome (Dressing Goal 1, OT) new goal  -     Row Name 01/09/23 1007          Toileting Goal 1 (OT)    Activity/Device (Toileting Goal 1, OT) toileting skills, all  -JJ     [place] Level/Cues Needed (Toileting Goal 1, OT) moderate assist (50-74% patient effort)  -JJ     Time Frame (Toileting Goal 1, OT) long term goal (LTG);by discharge  -JJ     Progress/Outcome (Toileting Goal 1, OT) new goal  -     Row Name 01/09/23 1007          Self-Feeding Goal 1 (OT)    Activity/Device  (Self-Feeding Goal 1, OT) self-feeding skills, all  -     [place] Level/Cues Needed (Self-Feeding Goal 1, OT) minimum assist (75% or more patient effort)  -     Time Frame (Self-Feeding Goal 1, OT) long term goal (LTG);by discharge  -     Progress/Outcomes (Self-Feeding Goal 1, OT) new goal  -     Row Name 01/09/23 1007          Therapy Assessment/Plan (OT)    Planned Therapy Interventions (OT) activity tolerance training;adaptive equipment training;BADL retraining;functional balance retraining;transfer/mobility retraining;strengthening exercise;occupation/activity based interventions;cognitive/visual perception retraining;patient/caregiver education/training  -           User Key  (r) = Recorded By, (t) = Taken By, (c) = Cosigned By    Initials Name Provider Type    Mindy Lopez, OTR/L, CSRS Occupational Therapist               Clinical Impression     Row Name 01/09/23 1007          Pain Assessment    Pain Intervention(s) Medication (See MAR);Repositioned;Ambulation/increased activity  -     Additional Documentation Pain Scale: FACES Pre/Post-Treatment (Group)  -     Row Name 01/09/23 1007          Pain Scale: FACES Pre/Post-Treatment    Pain: FACES Scale, Pretreatment 0-->no hurt  -     Posttreatment Pain Rating 0-->no hurt  -     Row Name 01/09/23 1007          Plan of Care Review    Plan of Care Reviewed With patient;other (see comments)  RN  -JJANKI     Outcome Evaluation OT eval completed. Pt presents lethargic, oriented to self only. Pt with minimal verbalization throughout session, able to follow approx 50% of simple one step commands. Unsure of pt's baseline function d/t her cognitive status but she does reside at [facility]. Today she demonstrates decreased strength, balance, activity tolerance, cognition and increased SOA. She remained on vapotherm throughout session with O2 sats ranging from 85%-96% with activity. She was able to complete rolling L/R in bed with Min A and vcs.  Required Mod/Max A x2 for supine <> sit at EOB. She has a forward flexed posture in sitting, able to come to standing with Min Ax2 and maintained for approx 15-30 seconds. Fatigued quickly with minimal activity. She was assisted back to bed with Max Ax2. She would benefit from skilled OT services to address these deficits. Recommend d/c back to SNF.  -     Row Name 01/09/23 1007          Therapy Assessment/Plan (OT)    Rehab Potential (OT) fair, will monitor progress closely  -     Criteria for Skilled Therapeutic Interventions Met (OT) yes;skilled treatment is necessary  -     Therapy Frequency (OT) 3 times/wk  -     Predicted Duration of Therapy Intervention (OT) 10 days  -     Row Name 01/09/23 1007          Therapy Plan Review/Discharge Plan (OT)    Anticipated Discharge Disposition (OT) [facility] nursing facility  -     Row Name 01/09/23 1007          Positioning and Restraints    Pre-Treatment Position in bed  -J     Post Treatment Position bed  -JJ     In Bed notified nsg;fowlers;call light within reach;encouraged to call for assist;exit alarm on;side rails up x3;heels elevated  -     Restraints released:;reapplied:;soft limb  B wirst  -J           User Key  (r) = Recorded By, (t) = Taken By, (c) = Cosigned By    Initials Name Provider Type    Mindy Lopez, OTR/L, CSRS Occupational Therapist               Outcome Measures     Row Name 01/09/23 1248          How much help from another is currently needed...    Putting on and taking off regular lower body clothing? 1  -JJ     Bathing (including washing, rinsing, and drying) 1  -JJ     Toileting (which includes using toilet bed pan or urinal) 1  -JJ     Putting on and taking off regular upper body clothing 2  -JJ     Taking care of personal grooming (such as brushing teeth) 2  -JJ     Eating meals 2  -JJ     AM-PAC 6 Clicks Score (OT) 9  -     Row Name 01/09/23 1248          Functional Assessment    Outcome Measure Options AM-PAC 6  Clicks Daily Activity (OT)  -           User Key  (r) = Recorded By, (t) = Taken By, (c) = Cosigned By    Initials Name Provider Type    Mindy Lopez, OTR/L, CSRS Occupational Therapist                  OT Recommendation and Plan  Planned Therapy Interventions (OT): activity tolerance training, adaptive equipment training, BADL retraining, functional balance retraining, transfer/mobility retraining, strengthening exercise, occupation/activity based interventions, cognitive/visual perception retraining, patient/caregiver education/training  Therapy Frequency (OT): 3 times/wk  Plan of Care Review  Plan of Care Reviewed With: patient, other (see comments) (RN)  Outcome Evaluation: OT eval completed. Pt presents lethargic, oriented to self only. Pt with minimal verbalization throughout session, able to follow approx 50% of simple one step commands. Unsure of pt's baseline function d/t her cognitive status but she does reside at [facility]. Today she demonstrates decreased strength, balance, activity tolerance, cognition and increased SOA. She remained on vapotherm throughout session with O2 sats ranging from 85%-96% with activity. She was able to complete rolling L/R in bed with Min A and vcs. Required Mod/Max A x2 for supine <> sit at EOB. She has a forward flexed posture in sitting, able to come to standing with Min Ax2 and maintained for approx 15-30 seconds. Fatigued quickly with minimal activity. She was assisted back to bed with Max Ax2. She would benefit from skilled OT services to address these deficits. Recommend d/c back to SNF.     Time Calculation:    Time Calculation- OT     Row Name 01/09/23 1007             Time Calculation- OT    OT Start Time 1037  add 9 minutes for CR  -JJ      OT Stop Time 1115  -      OT Time Calculation (min) 38 min  -JJ      OT Received On 01/09/23  -      OT Goal Re-Cert Due Date 01/19/23  -            User Key  (r) = Recorded By, (t) = Taken By, (c) = Cosigned By     Initials Name Provider Type    JJ Mindy Griffin OTR/L, CSRS Occupational Therapist              Therapy Charges for Today     Code Description Service Date Service Provider Modifiers Qty    97063520102 HC OT EVAL LOW COMPLEXITY 3 1/9/2023 Mindy Griffin OTR/L, CSRS GO 1               MICHAEL Harrell, ADRIANAS  1/9/2023

## 2023-01-09 NOTE — PROGRESS NOTES
LOS: 4 days   Patient Care Team:  Provider, No Known as PCP - General    Chief Complaint: Small bowel obstruction  Subjective     Subjective   Hospital day #4 for small bowel obstruction she is passing gas by report had 2 bowel movements, and had a small bowel follow-through today that showed contrast the rectum by 2 hours.  Objective      Objective     Vital Signs  Temp:  [97.2 °F (36.2 °C)-98.6 °F (37 °C)] 98.6 °F (37 °C)  Heart Rate:  [67-90] 88  Resp:  [16-20] 16  BP: (111-177)/(47-98) 111/47    Intake & Output (last 3 days)       01/06 0701 01/07 0700 01/07 0701  01/08 0700 01/08 0701  01/09 0700 01/09 0701  01/10 0700    P.O.    0    Total Intake(mL/kg)    0 (0)    Urine (mL/kg/hr) 0 (0) 500 (0.4) 1350 (1.1) 0 (0)    Emesis/NG output 1800 1960 1550 550    Stool 0   0    Total Output 1800 2460 2900 550    Net -1800 -2460 -2900 -550            Urine Unmeasured Occurrence 8 x 3 x 1 x 1 x    Stool Unmeasured Occurrence 2 x   2 x          Physical Exam:     General Appearance:    Alert, cooperative, in no acute distress   Lungs:     Clear to auscultation,respirations regular, even and                  unlabored    Heart:    Regular rhythm and normal rate, normal S1 and S2, no            murmur, no gallop, no rub, no click   Chest Wall:    No abnormalities observed   Abdomen:    Soft, nontender, nondistended, no masses, small bowel follow-through shows, in the rectum by 2 hours, her white count is unchanged at 13, electrolytes are much improved with a creatinine down to 1.6, BUN down to 46 sodium is still elevated 159.        Results Review:     I reviewed the patient's new clinical results.  I reviewed the patient's new imaging results and agree with the interpretation.    Results from last 7 days   Lab Units 01/09/23  0525 01/08/23  0323 01/07/23  0444   WBC 10*3/mm3 13.83* 14.36* 16.14*   HEMOGLOBIN g/dL 13.3 13.1 13.3   HEMATOCRIT % 44.8 43.7 44.8   PLATELETS 10*3/mm3 298 332 306        Results from last 7  days   Lab Units 01/09/23  1249 01/09/23  0525 01/08/23  2354 01/08/23  1228 01/08/23  0323 01/07/23  0444 01/06/23  1253 01/06/23  0904   SODIUM mmol/L 159* 153* 153*   < > 160* 152*   < > 140   POTASSIUM mmol/L 3.4* 3.3* 3.3*   < > 3.1* 4.0   < > 5.7*   CHLORIDE mmol/L 104 101 100   < > 105 105   < > 94*   CO2 mmol/L 46.0* 45.0* 44.0*   < > 44.0* 34.0*   < > 32.0*   BUN mg/dL 46* 44* 46*   < > 64* 91*   < > 100*   CREATININE mg/dL 1.66* 1.47* 1.54*   < > 1.87* 2.87*   < > 4.01*   CALCIUM mg/dL 9.7 9.3 9.3   < > 9.2 9.1   < > 10.1   BILIRUBIN mg/dL  --   --   --   --  0.2 0.3  --  0.3   ALK PHOS U/L  --   --   --   --  68 67  --  82   ALT (SGPT) U/L  --   --   --   --  12 15  --  21   AST (SGOT) U/L  --   --   --   --  14 17  --  22   GLUCOSE mg/dL 180* 135* 138*   < > 154* 121*   < > 172*    < > = values in this interval not displayed.       Assessment/Plan     Assessment & Plan       SBO (small bowel obstruction) (HCC)    Essential hypertension    LETITIA (acute kidney injury) (HCC)    Stage 3b chronic kidney disease (CKD) (HCC)    Left lower lobe pneumonia    Hyperkalemia    Chronic combined systolic (congestive) and diastolic (congestive) heart failure (HCC)    Acute respiratory failure with hypoxia (HCC)      Small bowel follow-through seems to show no obstruction okay with me to advance diet, and if we can get her sodium down potentially home to the nursing home in 1 to 3 days.      Tam Gabriel MD  01/09/23  15:20 CST      Time: time spent with patient 15 minutes     Part of this note may be an electronic transcription/translation of spoken language to printed text using the Dragon Dictation System.

## 2023-01-09 NOTE — PROGRESS NOTES
Adult Nutrition  Assessment/PES    Patient Name:  Susana Delcid  YOB: 1940  MRN: [id number]  Admit Date:  1/5/2023    Assessment Date:  1/9/2023    Comments:  NPO x 72 hr. If nutrition support (enteral or parenteral) warranted and appropriate with POC goals, RD available for recommendations. MSA completed - see note.      Reason for Assessment     Row Name 01/09/23 1159          Reason for Assessment    Reason For Assessment follow-up protocol;per organizational policy     Diagnosis gastrointestinal disease;cardiac disease;pulmonary disease;renal disease;hematological/related complications     Identified At Risk by Screening Criteria BMI  NPO x 72 hr                Nutrition/Diet History     Row Name 01/09/23 1200          Nutrition/Diet History    Typical Intake (Food/Fluid/EN/PN) NGT taped, low continuous suction with dark gray output. Bilateral soft wrist restraints and vapotherm. NFPE completed.     Factors Affecting Nutritional Intake altered gastrointestinal function;cognitive status/motor function;respiratory difficulty/therapies                Labs/Tests/Procedures/Meds     Row Name 01/09/23 1200          Labs/Procedures/Meds    Lab Results Reviewed reviewed     Lab Results Comments Glu, K+, Glu, BUN, Cr, WBC, GFR        Diagnostic Tests/Procedures    Diagnostic Test/Procedure Reviewed reviewed     Diagnostic Test/Procedures Comments KUB, SBFT pending        Medications    Pertinent Medications Reviewed reviewed     Pertinent Medications Comments See MAR                Physical Findings     Row Name 01/09/23 1201          Physical Findings    Overall Physical Appearance Vapotherm, BM 1/8, [place]ob[place] Score 14, skin on BLEs dry/flaky/scaly, GCS 13, NGT.                Estimated/Assessed Needs - Anthropometrics     Row Name 01/09/23 1202          Anthropometrics    Weight for Calculation 51.3 kg (113 lb)        Estimated/Assessed Needs    Additional Documentation Fluid Requirements (Group);KCAL/KG  (Group);Protein Requirements (Group)        KCAL/KG    KCAL/KG 30 Kcal/Kg (kcal);35 Kcal/Kg (kcal)     30 Kcal/Kg (kcal) 1537.68     35 Kcal/Kg (kcal) 1793.96        Protein Requirements    Weight Used For Protein Calculations 51.3 kg (113 lb 1.5 oz)     Est Protein Requirement Amount (gms/kg) 0.8 gm protein     Estimated Protein Requirements (gms/day) 41.04        Fluid Requirements    Fluid Requirements (mL/day) 1538     Estimated Fluid Requirement Method RDA Method     RDA Method (mL) 1538                Nutrition Prescription Ordered     Row Name 01/09/23 1202          Nutrition Prescription PO    Current PO Diet NPO                Evaluation of Received Nutrient/Fluid Intake     Row Name 01/09/23 1202          Nutrient/Fluid Evaluation    Number of Days Evaluated 3 days        Fluid Intake Evaluation    Oral Fluid (mL) 0  NPO x 72 hr        PO Evaluation    % PO Intake NPO x 72 hr                Malnutrition Severity Assessment     Row Name 01/09/23 1203          Malnutrition Severity Assessment    Malnutrition Type Chronic Disease - Related Malnutrition        Insufficient Energy Intake     Insufficient Energy Intake Findings Moderate     Insufficient Energy Intake  < or equal to 50% of est. energy requirement for > or equal to 5d)        Unintentional Weight Loss     Unintentional Weight Loss Findings Mild     Unintentional Weight Loss  Weight loss of 1-2% in one week        Muscle Loss    Loss of Muscle Mass Findings Moderate     [place] Severe - deep hollowing/scooping, lack of muscle to touch, facial bones well defined     Clavicle Bone Region Severe - protruding prominent bone     Acromion Bone Region Severe - squared shoulders, bones, and acromion process protrusion prominent     Dorsal Hand Region Severe - prominent depression     Patellar Region Severe - prominent bone, square looking, very little muscle definition     Posterior Calf Region Severe - thin with very little definition/firmness         Fat Loss    Subcutaneous Fat Loss Findings Severe     Orbital Region  Severe - pronounced hollowness/depression, dark circles, loose saggy skin     Upper Arm Region Severe - mostly skin, very little space between folds, fingers touch     Thoracic & Lumbar Region Moderate - ribs visible with mild depressions, iliac crest somewhat prominent        Criteria Met (Must meet criteria for severity in at least 2 of these categories: M Wasting, Fat Loss, Fluid, Secondary Signs, Wt. Status, Intake)    Patient meets criteria for  Severe Malnutrition                 Problem/Interventions:   Problem 1     Row Name 01/09/23 1204          Nutrition Diagnoses Problem 1    Problem 1 Malnutrition     Etiology (related to) Medical Diagnosis     Cardiac HTN     Gastrointestinal Other (comment);Bowel obstruction  mechanical bowel obstruction     Neurological AMS     Pulmonary/Critical Care Acute respiratory failure     Renal LETITIA;CKD     Signs/Symptoms (evidenced by) NPO;PO diet not tolerated;Report/Observation     Reported/Observed By RD/Tech     Other Comment severe muscle and fat wasting per NFPE                      Intervention Goal     Row Name 01/09/23 1204          Intervention Goal    General Disease management/therapy;Meet nutritional needs for age/condition;Reduce/improve symptoms     PO Initiate feeding;Establish PO     TF/PN Inititiate TF/PN  if appropriate with POC     Weight No significant weight loss                Nutrition Intervention     Row Name 01/09/23 1205          Nutrition Intervention    RD/Tech Action Follow Tx progress;Care plan reviewd;Await begin PO                Nutrition Prescription     Row Name 01/09/23 1205          Nutrition Prescription PO    PO Prescription Other (comment)  per MD/GI/team                Education/Evaluation     Row Name 01/09/23 1205          Education    Education No discharge needs identified at this time        Monitor/Evaluation    Monitor Per protocol                  Electronically signed by:  Susan Mathur RD  01/09/23 12:05 CST

## 2023-01-09 NOTE — THERAPY EVALUATION
Patient Name: Susana Delcid  : 1940    MRN: [id number]                              Today's Date: 2023       Admit Date: 2023    Visit Dx:     ICD-10-CM ICD-9-CM   1. Small bowel obstruction (HCC)  K56.609 560.9   2. Decreased activities of daily living (ADL)  Z78.9 V49.89   3. Impaired mobility  Z74.09 799.89     Patient Active Problem List   Diagnosis   • Acute systolic congestive heart failure (HCC)   • Elevated troponin level   • Iron deficiency anemia   • Tachycardia   • Elevated d-dimer   • UTI (urinary tract infection)   • Essential hypertension   • Mixed hyperlipidemia   • Pleural effusion, bilateral   • SBO (small bowel obstruction) (HCC)   • LETITIA (acute kidney injury) (HCC)   • Stage 3b chronic kidney disease (CKD) (HCC)   • Left lower lobe pneumonia   • Hyperkalemia   • Chronic combined systolic (congestive) and diastolic (congestive) heart failure (HCC)   • Acute respiratory failure with hypoxia (HCC)     Past Medical History:   Diagnosis Date   • Hyperlipidemia    • Hypertension      Past Surgical History:   Procedure Laterality Date   • APPENDECTOMY     • HYSTERECTOMY     • TONSILLECTOMY     • WRIST SURGERY Right       General Information     Row Name 23          Physical Therapy Time and Intention    Document Type evaluation  CC: n/v, abdominal distentions. Dx: SBO, Left LL PNA, LETITIA w dehydration, hyperkalemia, hypernatremia, . Hx dementia, hard of hearing.  -ALEC     Mode of Treatment physical therapy  -ALEC     Row Name 23          General Information    Patient Profile Reviewed yes  -ALEC     Prior Level of Function --  unsure of PLOF d/t cognitive status. Resides at [facility]  -ALEC     Existing Precautions/Restrictions oxygen therapy device and L/min;fall  vapotherm, Ng tube, B UE restraints  -ALEC     Barriers to Rehab medically complex;previous functional deficit;cognitive status;hearing deficit  -ALEC     Row Name 23          Living Environment    People in  Home facility resident  -ALEC     Name(s) of People in Home [facility]  -ALEC     Row Name 01/09/23 0942          Cognition    Orientation Status (Cognition) oriented to;person  responds to name, minimal verbalizations throughout session  -ALEC     Row Name 01/09/23 0942          Safety Issues, Functional Mobility    Safety Issues Affecting Function (Mobility) ability to follow commands;at risk behavior observed;awareness of need for assistance;impulsivity;insight into deficits/self-awareness;judgment;problem-solving;safety precaution awareness;safety precautions follow-through/compliance  -ALEC     Impairments Affecting Function (Mobility) balance;cognition;endurance/activity tolerance;shortness of breath;strength;postural/trunk control  -ALEC           User Key  (r) = Recorded By, (t) = Taken By, (c) = Cosigned By    Initials Name Provider Type    Fabian Valladares, PT DPT Physical Therapist               Mobility     Row Name 01/09/23 0942          Bed Mobility    Bed Mobility rolling right;rolling left;scooting/bridging;supine-sit;sit-supine  -ALEC     Rolling Left [place] (Bed Mobility) minimum assist (75% patient effort);verbal cues;nonverbal cues (demo/gesture)  -ALEC     Rolling Right [place] (Bed Mobility) minimum assist (75% patient effort);nonverbal cues (demo/gesture);verbal cues  -ALEC     Scooting/Bridging [place] (Bed Mobility) maximum assist (25% patient effort);2 person assist  -ALEC     Supine-Sit [place] (Bed Mobility) moderate assist (50% patient effort);maximum assist (25% patient effort);2 person assist  -ALEC     Sit-Supine [place] (Bed Mobility) maximum assist (25% patient effort);2 person assist  -LAEC     Assistive Device (Bed Mobility) bed rails;draw sheet;head of bed elevated  -ALEC     Row Name 01/09/23 0942          Sit-Stand Transfer    Sit-Stand [place] (Transfers) minimum assist (75% patient effort);2 person assist  -ALEC           User Key  (r) = Recorded By, (t) = Taken By,  (c) = Cosigned By    Initials Name Provider Type    Fabian Valladares PT DPT Physical Therapist               Obj/Interventions     Row Name 01/09/23 0942          Range of Motion Comprehensive    Comment, General Range of Motion B LE PROM WFL  -ALEC     Row Name 01/09/23 0942          Strength Comprehensive (MMT)    Comment, General Manual Muscle Testing (MMT) Assessment B LE functionally 3+/5  -ALEC     Row Name 01/09/23 0942          Balance    Balance Assessment sitting dynamic balance;standing static balance  -ALEC     Dynamic Sitting Balance moderate assist;maximum assist  -ALEC     Position, Sitting Balance supported;sitting edge of bed  -ALEC     Static Standing Balance minimal assist;2-person assist  -ALEC     Position/Device Used, Standing Balance supported  -ALEC           User Key  (r) = Recorded By, (t) = Taken By, (c) = Cosigned By    Initials Name Provider Type    Fabian Valladares PT DPT Physical Therapist               Goals/Plan     Row Name 01/09/23 0942          Bed Mobility Goal 1 (PT)    Activity/Assistive Device (Bed Mobility Goal 1, PT) sit to supine/supine to sit  -ALEC     [place] Level/Cues Needed (Bed Mobility Goal 1, PT) moderate assist (50-74% patient effort)  -ALEC     Time Frame (Bed Mobility Goal 1, PT) long term goal (LTG);10 days  -ALEC     Progress/Outcomes (Bed Mobility Goal 1, PT) new goal  -ALEC     Row Name 01/09/23 0942          Transfer Goal 1 (PT)    Activity/Assistive Device (Transfer Goal 1, PT) sit-to-stand/stand-to-sit;bed-to-chair/chair-to-bed  -ALEC     [place] Level/Cues Needed (Transfer Goal 1, PT) minimum assist (75% or more patient effort)  -ALEC     Time Frame (Transfer Goal 1, PT) long term goal (LTG);10 days  -ALEC     Progress/Outcome (Transfer Goal 1, PT) new goal  -ALEC     Row Name 01/09/23 0942          Gait Training Goal 1 (PT)    Activity/Assistive Device (Gait Training Goal 1, PT) gait (walking locomotion);assistive device use;decrease fall risk;improve balance and  speed;increase endurance/gait distance  -ALEC     [place] Level (Gait Training Goal 1, PT) minimum assist (75% or more patient effort)  -ALEC     Distance (Gait Training Goal 1, PT) 15 ft  -ALEC     Time Frame (Gait Training Goal 1, PT) long term goal (LTG);10 days  -ALEC     Progress/Outcome (Gait Training Goal 1, PT) new goal  -ALEC     Row Name 01/09/23 0942          Therapy Assessment/Plan (PT)    Planned Therapy Interventions (PT) bed mobility training;transfer training;gait training;balance training;home exercise program;patient/family education;postural re-education;strengthening  -ALEC           User Key  (r) = Recorded By, (t) = Taken By, (c) = Cosigned By    Initials Name Provider Type    Fabian Valladares, PT DPT Physical Therapist               Clinical Impression     Row Name 01/09/23 0942          Pain Scale: FACES Pre/Post-Treatment    Pain: FACES Scale, Pretreatment 0-->no hurt  -ALEC     Row Name 01/09/23 0942          Plan of Care Review    Plan of Care Reviewed With patient  -ALEC     Outcome Evaluation PT eval complete. She is lethargic but will awaken. Oriented to self with minimal speech predoced. Follows ~ 50% of simple 1 step commands for activity. She needs constant verbal and physical cues for activity and tasks. Requires mod/max assist to get to the EOB. Stands with min assist x 2. But could only stand briefly due to weakness, impaired balance and cognition. PT will cont to progress gait, balance, and strengthening. REcommend d.c to SNF for cont rehab.  -ALEC     Row Name 01/09/23 0942          Therapy Assessment/Plan (PT)    Patient/Family Therapy Goals Statement (PT) pt did not state  -ALEC     Rehab Potential (PT) fair, will monitor progress closely  -ALEC     Criteria for Skilled Interventions Met (PT) yes;meets criteria;skilled treatment is necessary  -ALEC     Therapy Frequency (PT) 2 times/day  -ALEC     Predicted Duration of Therapy Intervention (PT) until d/c  -ALEC     Row Name 01/09/23 0942           Positioning and Restraints    Pre-Treatment Position in bed  -ALEC     Post Treatment Position bed  -ALEC     In Bed notified nsg;fowlers;call light within reach;encouraged to call for assist;exit alarm on;legs elevated  -ALEC     Restraints reapplied:;soft limb  B UE  -ALEC           User Key  (r) = Recorded By, (t) = Taken By, (c) = Cosigned By    Initials Name Provider Type    Fabian Valladares, PT DPT Physical Therapist               Outcome Measures     Row Name 01/09/23 0942          How much help from another person do you currently need...    Turning from your back to your side while in flat bed without using bedrails? 2  -ALEC     Moving from lying on back to sitting on the side of a flat bed without bedrails? 2  -ALEC     Moving to and from a bed to a chair (including a wheelchair)? 1  -ALEC     Standing up from a chair using your arms (e.g., wheelchair, bedside chair)? 2  -ALEC     Climbing 3-5 steps with a railing? 1  -ALEC     To walk in hospital room? 1  -ALEC     AM-PAC 6 Clicks Score (PT) 9  -ALEC     Highest level of mobility 3 --> Sat at edge of bed  -ALEC     Row Name 01/09/23 1248 01/09/23 0942       Functional Assessment    Outcome Measure Options AM-PAC 6 Clicks Daily Activity (OT)  -JJANKI AM-PAC 6 Clicks Basic Mobility (PT)  -ALEC          User Key  (r) = Recorded By, (t) = Taken By, (c) = Cosigned By    Initials Name Provider Type    Fabian Valladares, PT DPT Physical Therapist    Mindy Lopez, OTR/L, CSRS Occupational Therapist                             Physical Therapy Education     Title: PT OT SLP Therapies (In Progress)     Topic: Physical Therapy (In Progress)     Point: Mobility training (In Progress)     Learning Progress Summary           Patient Acceptance, E, NR by ALEC at 1/9/2023 0942    Comment: benefits of activity, progression of PT POC                   Point: Home exercise program (Not Started)     Learner Progress:  Not documented in this visit.          Point: Body mechanics (Not  Started)     Learner Progress:  Not documented in this visit.          Point: Precautions (Not Started)     Learner Progress:  Not documented in this visit.                      User Key     Initials Effective Dates Name Provider Type Discipline    ALEC 08/02/16 -  Fabian Pena PT DPT Physical Therapist PT              PT Recommendation and Plan  Planned Therapy Interventions (PT): bed mobility training, transfer training, gait training, balance training, home exercise program, patient/family education, postural re-education, strengthening  Plan of Care Reviewed With: patient  Outcome Evaluation: PT eval complete. She is lethargic but will awaken. Oriented to self with minimal speech predoced. Follows ~ 50% of simple 1 step commands for activity. She needs constant verbal and physical cues for activity and tasks. Requires mod/max assist to get to the EOB. Stands with min assist x 2. But could only stand briefly due to weakness, impaired balance and cognition. PT will cont to progress gait, balance, and strengthening. REcommend d.c to SNF for cont rehab.     Time Calculation:    PT Charges     Row Name 01/09/23 1301             Time Calculation    Start Time 0942  -ALEC      Stop Time 1100  -ALEC      Time Calculation (min) 78 min  -ALEC      PT Received On 01/09/23  -ALEC      PT Goal Re-Cert Due Date 01/19/23  -ALEC         Time Calculation- PT    Total Timed Code Minutes- PT 18 minute(s)  -ALEC         Timed Charges    51003 - PT Therapeutic Activity Minutes 18  -ALEC         Total Minutes    Timed Charges Total Minutes 18  -ALEC       Total Minutes 18  -ALEC            User Key  (r) = Recorded By, (t) = Taken By, (c) = Cosigned By    Initials Name Provider Type    Fabian Valladares PT DPT Physical Therapist              Therapy Charges for Today     Code Description Service Date Service Provider Modifiers Qty    [id number]  PT THERAPEUTIC ACT EA 15 MIN 1/9/2023 Fabian Pena, PT DPT GP 1    [id number]  PT BRIANAL  MOD COMPLEXITY 4 1/9/2023 Fabian Pena, PT DPT GP 1          PT G-Codes  Outcome Measure Options: AM-PAC 6 Clicks Daily Activity (OT)  AM-PAC 6 Clicks Score (PT): 9  AM-PAC 6 Clicks Score (OT): 9  PT Discharge Summary  Anticipated Discharge Disposition (PT): skilled nursing facility    Fabian Pena, PT DPT  1/9/2023

## 2023-01-09 NOTE — THERAPY EVALUATION
Acute Care - Speech Language Pathology   Swallow Initial Evaluation [facility]     Patient Name: Susana Delcid  : 1940  MRN: [id number]  Today's Date: 2023               Admit Date: 2023    SPEECH-LANGUAGE PATHOLOGY EVALUATION - SWALLOW  Subjective: The patient was seen on this date for a Clinical Swallow evaluation.  Patient was alert, but poorly cooperative. She is hard of hearing which also contributed to her difficulty participating.  Significant history: Vomiting, bowel obstruction, dementia, LLL pneumonia, LETITIA, hyperkalemia, HTN, bandemia, [place], CXR: continued consolidation of medial L lung base, pneumonia vs atelectasis  Objective: Textures given included pudding thick, thin liquid, nectar thick liquid and honey thick liquid.  Assessment: Pt was constantly requesting water, but was very impulsive, taking large consecutive sips with thin water resulting in immediate coughing and significantly wet vocal quality. She spit out trials of different pudding, honey, and nectar thick liquids. She did eventually swallow one sip of nectar thick Ginger Ale with no s/s of aspiration. She also took ice chip trials with no overt s/s of aspiration.   SLP Findings:  Patient presents with moderate to severe oropharyngeal dysphagia, without esophageal component.   Recommendations: Diet Textures: nectar thick liquid, full liquids.  Medications should be taken crushed with thickened liquids. Pt is unlikely to have adequate PO intake at this time as she is only requesting water and refuses trials of most other liquids. May have ice between meals after oral care, under staff or family supervision and with the recommended strategies for safe swallowing.   Recommended Strategies: Upright for PO, small bites and sips and supervision with all PO. Oral care before breakfast, after all meals and PRN.  Other Recommended Evaluations: Re-evaluation at bedside    Dysphagia therapy is recommended.  Rolando Mayes, CCC-SLP  1/9/2023 16:21 CST    Visit Dx:     ICD-10-CM ICD-9-CM   1. Small bowel obstruction (HCC)  K56.609 560.9   2. Decreased activities of daily living (ADL)  Z78.9 V49.89   3. Impaired mobility  Z74.09 799.89   4. Dysphagia, unspecified type  R13.10 787.20     Patient Active Problem List   Diagnosis   • Acute systolic congestive heart failure (HCC)   • Elevated troponin level   • Iron deficiency anemia   • Tachycardia   • Elevated d-dimer   • UTI (urinary tract infection)   • Essential hypertension   • Mixed hyperlipidemia   • Pleural effusion, bilateral   • SBO (small bowel obstruction) (HCC)   • LETITIA (acute kidney injury) (HCC)   • Stage 3b chronic kidney disease (CKD) (HCC)   • Left lower lobe pneumonia   • Hyperkalemia   • Chronic combined systolic (congestive) and diastolic (congestive) heart failure (HCC)   • Acute respiratory failure with hypoxia (HCC)     Past Medical History:   Diagnosis Date   • Hyperlipidemia    • Hypertension      Past Surgical History:   Procedure Laterality Date   • APPENDECTOMY     • HYSTERECTOMY     • TONSILLECTOMY     • WRIST SURGERY Right        SLP Recommendation and Plan  SLP Swallowing Diagnosis: mild-moderate, oral dysphagia, mod-severe, suspected pharyngeal dysphagia (01/09/23 1521)  SLP Diet Recommendation: nectar thick liquids, full liquid diet, ice chips between meals after oral care, with supervision (01/09/23 1521)  Recommended Precautions and Strategies: upright posture during/after eating, small bites of food and sips of liquid, general aspiration precautions (01/09/23 1521)  SLP Rec. for Method of Medication Administration: meds crushed, with thick liquids, with puree (01/09/23 1521)     Monitor for Signs of Aspiration: yes, cough, gurgly voice, throat clearing, notify SLP if any concerns (01/09/23 1521)  Recommended Diagnostics: reassess via clinical swallow evaluation (01/09/23 1521)  Swallow Criteria for Skilled Therapeutic Interventions Met: demonstrates skilled  criteria (01/09/23 1521)  Anticipated Discharge Disposition (SLP): skilled nursing facility (01/09/23 1521)  Rehab Potential/Prognosis, Swallowing: re-evaluate goals as necessary (01/09/23 1521)  Therapy Frequency (Swallow): 2 days per week (01/09/23 1521)  Predicted Duration Therapy Intervention (Days): until discharge (01/09/23 1521)                                        Plan of Care Reviewed With: patient  Outcome Evaluation: See note      SWALLOW EVALUATION (last 72 hours)     SLP Adult Swallow Evaluation     Row Name 01/09/23 1521                   Rehab Evaluation    Document Type evaluation  -MB        Subjective Information complains of  wanting water  -MB        Patient Observations alert;poorly cooperative  -MB        Patient/Family/Caregiver Comments/Observations No family present, [place]  -MB           General Information    Patient Profile Reviewed yes  -MB        Pertinent History Of Current Problem Vomiting, bowel obstruction, dementia, LLL pneumonia, LETITIA, hyperkalemia, HTN, bandemia, [place], CXR: continued consolidation of medial L lung base, pneumonia vs atelectasis  -MB        Current Method of Nutrition thin liquids;full liquids  -MB        Precautions/Limitations, Vision WFL;for purposes of eval  -MB        Precautions/Limitations, Hearing hearing impairment, bilaterally  -MB        Prior Level of Function-Communication cognitive-linguistic impairment;other (see comments)  dementia  -MB        Prior Level of Function-Swallowing unknown  -MB        Plans/Goals Discussed with patient  -MB        Barriers to Rehab cognitive status;hearing deficit  -MB        Patient's Goals for Discharge --  Pt wants water  -MB           Pain    Additional Documentation Pain Scale: FACES Pre/Post-Treatment (Group)  -MB           Pain Scale: FACES Pre/Post-Treatment    Pain: FACES Scale, Pretreatment 0-->no hurt  -MB           Oral Motor Structure and Function    Secretion Management WNL/WFL  -MB         Mucosal Quality moist, healthy  -MB           Oral Musculature and Cranial Nerve Assessment    Oral Motor General Assessment unable to assess  -MB           General Eating/Swallowing Observations    Respiratory Support Currently in Use high-flow nasal cannula  -MB        Eating/Swallowing Skills fed by SLP  -MB        Positioning During Eating upright in bed  -MB        Utensils Used spoon;straw  -MB        Consistencies Trialed thin liquids;nectar/syrup-thick liquids;honey-thick liquids;pudding thick  -MB           Clinical Swallow Eval    Oral Prep Phase impaired  -MB        Oral Transit WFL  -MB        Oral Residue WFL  -MB        Pharyngeal Phase suspected pharyngeal impairment  -MB        Esophageal Phase unremarkable  -MB        Clinical Swallow Evaluation Summary See note  -MB           Oral Prep Concerns    Oral Prep Concerns spits out food prior to swallow  -MB        Spits Out Food Prior to Swallow nectar;honey;pudding  -MB           Pharyngeal Phase Concerns    Pharyngeal Phase Concerns wet vocal quality;cough  -MB        Wet Vocal Quality thin  -MB        Cough thin  -MB           SLP Evaluation Clinical Impression    SLP Swallowing Diagnosis mild-moderate;oral dysphagia;mod-severe;suspected pharyngeal dysphagia  -MB        Functional Impact risk of aspiration/pneumonia;risk of malnutrition;risk of dehydration  -MB        Rehab Potential/Prognosis, Swallowing re-evaluate goals as necessary  -MB        Swallow Criteria for Skilled Therapeutic Interventions Met demonstrates skilled criteria  -MB           Recommendations    Therapy Frequency (Swallow) 2 days per week  -MB        Predicted Duration Therapy Intervention (Days) until discharge  -MB        SLP Diet Recommendation nectar thick liquids;full liquid diet;ice chips between meals after oral care, with supervision  -MB        Recommended Diagnostics reassess via clinical swallow evaluation  -MB        Recommended Precautions and Strategies upright  posture during/after eating;small bites of food and sips of liquid;general aspiration precautions  -MB        Oral Care Recommendations Oral Care BID/PRN  -MB        SLP Rec. for Method of Medication Administration meds crushed;with thick liquids;with puree  -MB        Monitor for Signs of Aspiration yes;cough;gurgly voice;throat clearing;notify SLP if any concerns  -MB        Anticipated Discharge Disposition (SLP) skilled nursing facility  -MB           Swallow Goals (SLP)    Swallow LTGs Swallow Long Term Goal (free text)  -MB        Swallow STGs diet tolerance goal selection (SLP)  -MB        Diet Tolerance Goal Selection (SLP) Patient will tolerate trials of  -MB           (LTG) Swallow    (LTG) Swallow Pt will tolerate least restrictive diet with no overt s/s of aspiration.  -MB        [place] (Swallow Long Term Goal) with moderate cues (50-74% accuracy)  -MB        Time Frame (Swallow Long Term Goal) by discharge  -MB        Barriers (Swallow Long Term Goal) n/a  -MB        Progress/Outcomes (Swallow Long Term Goal) new goal  -MB        Comment (Swallow Long Term Goal) n/a  -MB           (STG) Patient will tolerate trials of    Consistencies Trialed (Tolerate trials) nectar/ mildly thick liquids  -MB        Desired Outcome (Tolerate trials) without signs/symptoms of aspiration;with adequate oral prep/transit/clearance  -MB        [place] (Tolerate trials) with moderate cues (50-74% accuracy)  -MB        Time Frame (Tolerate trials) by discharge  -MB        Progress/Outcomes (Tolerate trials) new goal  -MB        Comment (Tolerate trials) n/a  -MB              User Key  (r) = Recorded By, (t) = Taken By, (c) = Cosigned By    Initials Name Effective Dates    Rolando Vo, CCC-SLP 06/16/21 -                 EDUCATION  The patient has been educated in the following areas:   Dysphagia (Swallowing Impairment).        SLP GOALS     Row Name 01/09/23 1521             (LTG) Swallow    (LTG) Swallow  Pt will tolerate least restrictive diet with no overt s/s of aspiration.  -MB      [place] (Swallow Long Term Goal) with moderate cues (50-74% accuracy)  -MB      Time Frame (Swallow Long Term Goal) by discharge  -MB      Barriers (Swallow Long Term Goal) n/a  -MB      Progress/Outcomes (Swallow Long Term Goal) new goal  -MB      Comment (Swallow Long Term Goal) n/a  -MB         (STG) Patient will tolerate trials of    Consistencies Trialed (Tolerate trials) nectar/ mildly thick liquids  -MB      Desired Outcome (Tolerate trials) without signs/symptoms of aspiration;with adequate oral prep/transit/clearance  -MB      [place] (Tolerate trials) with moderate cues (50-74% accuracy)  -MB      Time Frame (Tolerate trials) by discharge  -MB      Progress/Outcomes (Tolerate trials) new goal  -MB      Comment (Tolerate trials) n/a  -MB            User Key  (r) = Recorded By, (t) = Taken By, (c) = Cosigned By    Initials Name Provider Type    Rolando Vo CCC-SLP Speech and Language Pathologist                   Time Calculation:    Time Calculation- SLP     Row Name 01/09/23 1621             Time Calculation- SLP    SLP Start Time 1521  -MB      SLP Stop Time 1621  -MB      SLP Time Calculation (min) 60 min  -MB      SLP Received On 01/09/23  -MB      SLP Goal Re-Cert Due Date 01/19/23  -MB         Untimed Charges    71161-BW Eval Oral Pharyng Swallow Minutes 60  -MB         Total Minutes    Untimed Charges Total Minutes 60  -MB       Total Minutes 60  -MB            User Key  (r) = Recorded By, (t) = Taken By, (c) = Cosigned By    Initials Name Provider Type    Rolando Vo CCC-SLP Speech and Language Pathologist                Therapy Charges for Today     Code Description Service Date Service Provider Modifiers Qty    [id number] HC ST EVAL ORAL PHARYNG SWALLOW 4 1/9/2023 Rolando Mayes CCC-SLP GN 1               Rolando BRADY. LORETTA Mayes  1/9/2023

## 2023-01-09 NOTE — CASE MANAGEMENT/SOCIAL WORK
Continued Stay Note   Bernadine     Patient Name: Susana Delcid  MRN: [id number]  Today's Date: 1/9/2023    Admit Date: 1/5/2023    Plan: [facility]   Discharge Plan     Row Name 01/09/23 1015       Plan    Plan [facility]    Plan Comments Following for return to [facility]. Patient continues to have bed hold and can return when medically ready for dc.                       Expected Discharge Date and Time     Expected Discharge Date Expected Discharge Time    Jan 9, 2023             SHERIN Mansfield

## 2023-01-09 NOTE — PLAN OF CARE
Goal Outcome Evaluation:  Plan of Care Reviewed With: patient, other (see comments) (RN)           Outcome Evaluation: OT eval completed. Pt presents lethargic, oriented to self only. Pt with minimal verbalization throughout session, able to follow approx 50% of simple one step commands. Unsure of pt's baseline function d/t her cognitive status but she does reside at [facility]. Today she demonstrates decreased strength, balance, activity tolerance, cognition and increased SOA. She remained on vapotherm throughout session with O2 sats ranging from 85%-96% with activity. She was able to complete rolling L/R in bed with Min A and vcs. Required Mod/Max A x2 for supine <> sit at EOB. She has a forward flexed posture in sitting, able to come to standing with Min Ax2 and maintained for approx 15-30 seconds. Fatigued quickly with minimal activity. She was assisted back to bed with Max Ax2. She would benefit from skilled OT services to address these deficits. Recommend d/c back to SNF.

## 2023-01-09 NOTE — PLAN OF CARE
"Goal Outcome Evaluation:  Plan of Care Reviewed With: patient        Progress: declining  Outcome Evaluation: PATIENT STARTED THE SHIFT RECEIVING 1 LITER BOLUS OF D5W AND 5 RUNS OF K+. D5+20 MEQ NOW INFUSING AT 50 ML/HR. PATIENT HAS BEEN MORE ALERT TODAY, STILL ONLY ORIENTED TO SELF, BUT SAYING THINGS LIKE \"GET THIS OUT OF MY NOSE\" AND \"UNTIE MY HANDS\".  AT ABOUT 1500, PATIENT'S PULSE OX WAS ALARMING AND SHE HAD PULLED OUT HER NG TUBE AND TAKEN OFF HER OXYGEN. NG TUBE REPLACED AT 55 CM, CONFIRMED PLACEMENT WITH KUB, THEN ADVANCED ANOTHER 5 CM PER RECOMMENDATIONS. PATIENT'S OXYGEN REQUIREMENTS HAVE INCREASED THOUGH AND SHE IS NOW ON VAPOTHERM. ABG DONE. NO S/S OF PAIN. TURN Q2H. PUREWIC IN PLACE. PLANS FOR SMALL BOWEL FOLLOW THROUGH TOMORROW. RESTRAINTS IN PLACE TO HOPEFULLY PREVENT FURTHER REMOVAL OF TUBES/LINES. CONT TO MONITOR.  "

## 2023-01-09 NOTE — PROGRESS NOTES
[facility]  INPATIENT PROGRESS NOTE    Patient Name: Susana Delcid  Date of Admission: 1/5/2023  Today's Date: 01/09/23  Length of Stay: 4  Primary Care Physician: Provider, No Known    Subjective   Chief Complaint: Bowel obstruction    HPI   Patient examined lying in bed on high flow oxygen.  Difficult to verbally communicate with her in detail as she is not readily responsive.  She will track with her eyes and tells me to take the tube out.  I believe she is referring to her NG tube.  I told her we are getting a small bowel follow-through today and hopefully can remove the tube if general surgery is okay with it.  No further vomiting.    ROS:  Unable to do obtain due to dementia and altered mental status.  Otherwise as stated above.  All pertinent negatives and positives are as above. All other systems have been reviewed and are negative unless otherwise stated.     Objective    Temp:  [97.2 °F (36.2 °C)-98.6 °F (37 °C)] 97.6 °F (36.4 °C)  Heart Rate:  [67-90] 90  Resp:  [16-18] 18  BP: (145-177)/(64-98) 154/74  Physical Exam  Vitals and nursing note reviewed.   Constitutional:       Comments: Cachectic.  Chronically ill.  Advanced age.   HENT:      Head: Normocephalic and atraumatic.   Eyes:      Conjunctiva/sclera: Conjunctivae normal.      Pupils: Pupils are equal, round, and reactive to light.   Neck:      Vascular: No JVD.   Cardiovascular:      Rate and Rhythm: Normal rate and regular rhythm.      Heart sounds: Normal heart sounds.   Pulmonary:      Effort: No respiratory distress.      Breath sounds: No wheezing or rales.      Comments: Diminished breath sound bilateral, clear, on 20 L / 60% Vapotherm.  Chest:      Chest wall: No tenderness.   Abdominal:      General: Bowel sounds are normal. There is no distension.      Palpations: Abdomen is soft.      Tenderness: There is no abdominal tenderness.   Musculoskeletal:         General: No tenderness or  deformity.      Cervical back: Neck supple.   Skin:     General: Skin is warm and dry.      Capillary Refill: Capillary refill takes 2 to 3 seconds.      Findings: No rash.   Neurological:      Cranial Nerves: No cranial nerve deficit.      Motor: Weakness present. No abnormal muscle tone.      Coordination: Coordination abnormal.      Gait: Gait abnormal.      Deep Tendon Reflexes: Reflexes normal.           Results Review:  I have reviewed the labs, radiology results, and diagnostic studies.    Laboratory Data:   Results from last 7 days   Lab Units 01/09/23  0525 01/08/23  0323 01/07/23  0444   WBC 10*3/mm3 13.83* 14.36* 16.14*   HEMOGLOBIN g/dL 13.3 13.1 13.3   HEMATOCRIT % 44.8 43.7 44.8   PLATELETS 10*3/mm3 298 332 306        Results from last 7 days   Lab Units 01/09/23  0525 01/08/23  2354 01/08/23  1746 01/08/23  1228 01/08/23  0323 01/07/23  0444 01/06/23  1253 01/06/23  0904   SODIUM mmol/L 153* 153* 155*   < > 160* 152*   < > 140   POTASSIUM mmol/L 3.3* 3.3* 3.4*   < > 3.1* 4.0   < > 5.7*   CHLORIDE mmol/L 101 100 102   < > 105 105   < > 94*   CO2 mmol/L 45.0* 44.0* 46.0*   < > 44.0* 34.0*   < > 32.0*   BUN mg/dL 44* 46* 51*   < > 64* 91*   < > 100*   CREATININE mg/dL 1.47* 1.54* 1.63*   < > 1.87* 2.87*   < > 4.01*   CALCIUM mg/dL 9.3 9.3 9.3   < > 9.2 9.1   < > 10.1   BILIRUBIN mg/dL  --   --   --   --  0.2 0.3  --  0.3   ALK PHOS U/L  --   --   --   --  68 67  --  82   ALT (SGPT) U/L  --   --   --   --  12 15  --  21   AST (SGOT) U/L  --   --   --   --  14 17  --  22   GLUCOSE mg/dL 135* 138* 137*   < > 154* 121*   < > 172*    < > = values in this interval not displayed.       Culture Data:   Blood Culture   Date Value Ref Range Status   01/05/2023 No growth at 2 days  Preliminary   01/05/2023 No growth at 2 days  Preliminary       Radiology Data:   Imaging Results (Last 24 Hours)     Procedure Component Value Units Date/Time    FL Small Bowel Follow Through Single-Contrast [[id number]] Collected:  01/09/23 1238     Updated: 01/09/23 1244    Narrative:      EXAMINATION: FL SMALL BOWEL FOLLOW THROUGH SINGLE-CONTRAST- 1/9/2023  12:38 PM CST     HISTORY: Patient with small bowel obstruction please evaluate with a  small bowel follow-through can  complete through the NG tube;  K56.609-Unspecified intestinal obstruction, unspecified as to partial  versus complete obstruction.     REPORT: Small bowel follow-through was performed with administration of  contrast through the existing NG tube.     COMPARISON: KUB on same day.     After contrast was administered through the NG tube, images were  obtained at 15 minutes, 30 minutes and 2 hours. At 15 minutes, the  stomach is mostly opacified and there is contrast within the upper  abdominal loops of jejunum, which are mildly distended, measuring up to  4.2 cm. No jejunal mucosal thickening is identified. There is a small  duodenal diverticulum. At 30 minutes, most of the contrast is seen in  the jejunum and proximal ileum, without mass effect. At 2 hours,  contrast is seen throughout the small intestine and in the colon to the  level of the rectum. There is residual contrast in the stomach as well.       Impression:      Contrast reaches the rectum by 2 hours and there is no  evidence of small bowel obstruction.  This report was finalized on 01/09/2023 12:41 by Dr. Gilmer Santizo MD.    XR Abdomen KUB [[id number]] Resulted: 01/09/23 0918     Updated: 01/09/23 0918    XR Abdomen KUB [[id number]] Collected: 01/09/23 0606     Updated: 01/09/23 0612    Narrative:      EXAMINATION: XR ABDOMEN KUB-     1/9/2023 3:30 AM CST     HISTORY: new NG tube placement verification; K56.609-Unspecified  intestinal obstruction, unspecified as to partial versus complete  obstruction     A frontal projection of the upper abdomen including the lower chest is  compared with the previous study dated 01/08/2023     There is interval minimal advancement of the gastric tube. The distal  sidehole now  appears to be distal to the gastroesophageal junction. The  distal end of the tube is in the proximal stomach.     There is a left lower lung consolidation with complete obliteration left  diaphragm.     The limited visualized abdomen show moderate gas and stool in the colon.  No evidence of dilatation or large bowel loops.     Both kidneys are obscured by the bowel contents. Small calcification is  seen projected over the right midabdomen medially. These may represent  calcified lymph nodes, granulomatous or calculi?. These are similar to  the previous study.     Severe atheromatous changes thoracic and abdominal aorta are noted.     No acute bony abnormality.       Impression:      1. Distal end of the nasogastric tube is in the proximal stomach. The  distal sidehole is adjacent and below the gastroesophageal junction.  This report was finalized on 01/09/2023 06:09 by Dr. Lisa Meng MD.    XR Abdomen KUB [[id number]] Collected: 01/08/23 1543     Updated: 01/08/23 1547    Narrative:      XR ABDOMEN KUB- 1/8/2023 3:38 PM CST     HISTORY: PLACEMENT OF NEW NG TUBE; K56.609-Unspecified intestinal  obstruction, unspecified as to partial versus complete obstruction     FINDINGS:  Feeding tube is seen with the tip located in the upper  gastric body. Catheter sidehole remains at the gastroesophageal  junction.     No gross intraperitoneal free air. Atelectasis in the left lung base.  Right lung base is clear.          Impression:      Feeding tube tip located in the upper gastric body. Catheter  sidehole remains at the gastroesophageal junction. Consider additional  slight advancement.     This report was finalized on 01/08/2023 15:44 by Dr Joe Perez, .          I have reviewed the patient's current medications.     Assessment/Plan   Assessment  Active Hospital Problems    Diagnosis    • **SBO (small bowel obstruction) ([facility])    • LETITIA (acute kidney injury) ([facility])    • Stage 3b chronic kidney disease (CKD) ([facility])    •  Left lower lobe pneumonia    • Hyperkalemia    • Chronic combined systolic (congestive) and diastolic (congestive) heart failure (HCC)    • Essential hypertension        Treatment Plan  Patient is a 82-year-old female who presents to [facility] emergency department from [facility].  Patient has history of dementia, hyperlipidemia, hypertension.  This list is not at all exclusive as full history is limited at this time with patient being a poor historian. She presents with chief complaint of nausea and vomiting, the patient has abdominal distention, NG tube placed in emergency department.  CT scan abdomen shows small bowel obstruction.  Patient initially tachycardic and hypoxic requiring Vapotherm nasal cannula supplementation.  Creatinine on presentation 3.4, creatinine of 1.35 and September 2021.  GFR on presentation was 13, GFR in September 2021 was 38.     Small bowel obstruction-   NG tube initially placed in stomach and hooked to low intermittent suction.  Patient pulled NG tube out.  Plan to reinstate NG tube.  Dr. Gabriel with general surgery following.    Small bowel follow-through conducted today.  Awaiting general surgery recommendations.  Potential discontinuation of NG tube and advancement of diet.     Acute kidney injury superimposed on stage 3b chronic kidney disease-  Creatinine 3.4, BUN 83, GFR 13.1 on arrival.  These values are worsened from baseline of stage IIIb chronic kidney disease. Creatinine 1.35 and GFR 38 in September 2021.  Today, potassium 3.3, creatinine 1.47, BUN 44, GFR 35.7.  Continue gentle IV hydration in the presence of worsening pulmonary status.     Hypoxia-  Will obtain chest x-ray.  Patient's oxygen requirements have increased recently.  Currently on 20 L / 60% Vapotherm.    Hypernatremia-  Sodium 153.  D5 with 20 KCl IV infusion at 50 mL/h.     Left lower lobe pneumonia-  WBC 13.83, afebrile.   Azithromycin and Rocephin IV.  Blood cultures x2 no  growth at 3 days.  COVID-negative.  We will repeat chest x-ray today.  Patient currently requiring 20 L / 60% Vapotherm.     Chronic combined systolic and diastolic heart failure/hypertension-  Echocardiogram from January 2018 shows EF 31 to 35% with diastolic dysfunction.  We will be mindful of this with IV hydration.  We will hold home Lasix for now.   Hopefully change IV Lopressor to p.o. Coreg when p.o. intake approved from general surgery.  Chest x-ray pending.     Hypokalemia  20 mEq KCl and IV fluids.  Will give p.o. potassium replacement when approved for p.o. intake.    Palliative care consulted.     Patient is a marquez of the [facility] and a full code.     Medical Decision Making  Number and Complexity of problems: 3 high complexity problems in acute kidney injury and small bowel obstruction and hypoxia.  3 moderate complexity problems in pneumonia and chronic combined systolic and diastolic heart failure and hypokalemia and hypernatremia  Differential Diagnosis: None     Conditions and Status  Small bowel obstruction resolved  Acute kidney injury improving  Left lower lobe pneumonia stable  Chronic combined systolic and diastolic heart failure requiring more oxygen  Hypokalemia resolved.  Hypernatremia improving.  Hypoxia worsening     MDM Data  External documents reviewed: Echocardiogram from 2018  Cardiac tracing (EKG, telemetry) interpretation: Telemetry shows trigeminy  Radiology interpretation: Reviewed radiologist interpretation of chest x-ray/KUB  Labs reviewed: Arterial blood gas, CMP, CBC with differential, urinalysis  Any tests that were considered but not ordered: Echocardiogram     Decision rules/scores evaluated (example [id number], Wells, etc): None     Discussed with: Patient, although I do not believe she understands.     Care Planning  Shared decision making: Discussed plan of care with patient in the presence of cognitive disability.  Code status and discussions: Full code, patient [place] a [place]  of [place] state.     Disposition  Social Determinants of Health that impact treatment or disposition: Patient resides at a skilled nursing facility  2 to 6 days.    Electronically signed by JAMIR Worthy, 01/09/23, 12:48 CST.

## 2023-01-09 NOTE — PLAN OF CARE
Goal Outcome Evaluation:  Plan of Care Reviewed With: patient        Progress: no change  Outcome Evaluation: VSS.  Sinus 72-93 with MF, PAC and PVC on tele.  No indicators of pain.  Unable to wean from vapotherm this shift.  Restraints still in place.  Pt pulled out NG tube early this morning.  New NG placed and KUB obtained.  Waiting on doctor's order to restart suction.  900+ ml output this shift.  Purewick in place, tolerating well.  Blanchable redness noted to coccyx.  Wedges in use, turns 2qh.  Pt had periods of resting well overnight.  Continues to be disoriented.  Safety maintained.

## 2023-01-09 NOTE — PLAN OF CARE
Goal Outcome Evaluation:  Plan of Care Reviewed With: patient           Outcome Evaluation: PT eval complete. She is lethargic but will awaken. Oriented to self with minimal speech predoced. Follows ~ 50% of simple 1 step commands for activity. She needs constant verbal and physical cues for activity and tasks. Requires mod/max assist to get to the EOB. Stands with min assist x 2. But could only stand briefly due to weakness, impaired balance and cognition. PT will cont to progress gait, balance, and strengthening. REcommend d.c to SNF for cont rehab.

## 2023-01-09 NOTE — CONSULTS
Nephrology ([facility]) Consult Note      Patient:  Susana Delcid  YOB: 1940  Date of Service: 1/9/2023  MRN: [id number]   Acct: [id number]   Primary Care Physician: Provider, No Known  Advance Directive:   Code Status and Medical Interventions:   Ordered at: 01/05/23 9138     Level Of Support Discussed With:    Patient     Code Status (Patient has no pulse and is not breathing):    CPR (Attempt to Resuscitate)     Medical Interventions (Patient has pulse or is breathing):    Full Support     Admit Date: 1/5/2023       Hospital Day: 4  Referring Provider: Dony Samuels,       Patient Seen, Chart, Consults, Notes, Labs, Radiology studies reviewed.    Chief complaint: Abnormal labs.    Subjective:  Susana Delcid is a 82 y.o. female  whom we were consulted for acute kidney injury/hypernatremia/hypokalemia.  Patient has advanced dementia and hard of hearing and her medical history was obtained from her chart.  Patient is a resident of [facility].  She was sent to the emergency room as he she has been complaining of nausea and vomiting.  CT scan of the abdomen consistent with mechanical bowel obstruction with moderate small bowel distention.  Incidental finding on the CT scan consistent left lower lobe pneumonia.  CT scan of the abdomen also showed cholelithiasis.  Routine lab data initially indicated creatinine of 3.4 mg with a GFR of 13, calcium was 11.2.  Hospital course remarkable for fluid resuscitation and had significant improvement of renal function.  She still has hypernatremia and hypokalemia.  Patient is also receiving IV antibiotics for the treatment of pneumonia.  She is extremely thirsty and asking about water.    Allergies:  Aspirin, Codeine, and Motrin [ibuprofen]    Home Meds:  Medications Prior to Admission   Medication Sig Dispense Refill Last Dose   • acetaminophen (TYLENOL) 325 MG tablet Take 650 mg by mouth Every 6 (Six) Hours As Needed for Mild  Pain .   Past Week   • ARIPiprazole (ABILIFY) 5 MG tablet Take 5 mg by mouth Daily.   1/5/2023   • carvedilol (COREG) 12.5 MG tablet Take 12.5 mg by mouth 2 (Two) Times a Day With Meals.   1/5/2023   • cholecalciferol (VITAMIN D3) 1.25 MG (54609 UT) capsule Take 50,000 Units by mouth 1 (One) Time Per Week. One time a day every Tue for supplement   Past Week   • cloNIDine (CATAPRES) 0.1 MG tablet Take 0.1 mg by mouth Daily As Needed for High Blood Pressure. 1 tablet every 24 hours as needed for high BP for SBP greater than 180, DBP greater than 90   Past Month   • furosemide (LASIX) 40 MG tablet Take 40 mg by mouth Daily.   1/5/2023   • hydrALAZINE (APRESOLINE) 50 MG tablet Take 1 tablet by mouth Every 8 (Eight) Hours.   1/5/2023   • isosorbide dinitrate (ISORDIL) 20 MG tablet Take 1 tablet by mouth Every 8 (Eight) Hours.   1/5/2023   • potassium chloride (MICRO-K) 10 MEQ CR capsule Take 4 capsules by mouth Daily.   1/5/2023   • PARoxetine (PAXIL) 10 MG tablet Take 10 mg by mouth Every Morning.   Unknown       Medicines:  Current Facility-Administered Medications   Medication Dose Route Frequency Provider Last Rate Last Admin   • acetaminophen (TYLENOL) tablet 650 mg  650 mg Oral Q4H PRN Dony Samuels DO       • albuterol (PROVENTIL) nebulizer solution 0.5% 2.5 mg/0.5mL  1.25 mg Nebulization 4x Daily - RT Dony Samuels DO   1.25 mg at 01/09/23 1453    And   • ipratropium (ATROVENT) nebulizer solution 0.5 mg  0.5 mg Nebulization 4x Daily - RT Dony Samuels DO   0.5 mg at 01/09/23 1453   • azithromycin (ZITHROMAX) 500 mg in sodium chloride 0.9 % 250 mL IVPB-VTB  500 mg Intravenous Q24H Dony Samuels DO   500 mg at 01/09/23 0043   • cefTRIAXone (ROCEPHIN) 1 g in sodium chloride 0.9 % 100 mL IVPB-VTB  1 g Intravenous Q24H Dony Samuels  mL/hr at 01/08/23 2059 1 g at 01/08/23 2059   • HYDROmorphone (DILAUDID) injection 0.25 mg  0.25 mg Intravenous Q4H PRN Dony Samuels DO       •  "metoprolol tartrate (LOPRESSOR) injection 5 mg  5 mg Intravenous Q6H Seamus Ruvalcaba APRN   5 mg at 01/09/23 1255   • ondansetron (ZOFRAN) injection 4 mg  4 mg Intravenous Q6H PRN Dony Samuels DO       • silver sulfadiazine (SILVADENE, SSD) 1 % cream   Topical Q12H PRN Dony Samuels DO       • sodium chloride 0.9 % flush 10 mL  10 mL Intravenous Q12H Dony Samuels DO   10 mL at 01/08/23 2059   • sodium chloride 0.9 % flush 10 mL  10 mL Intravenous PRN Dony Samuels DO       • sodium chloride 0.9 % infusion 40 mL  40 mL Intravenous PRN Dony Samuels DO       • sodium chloride nasal spray 2 spray  2 spray Each Nare PRN Claudio Cobb MD   2 spray at 01/08/23 1634       Past Medical History:  Past Medical History:   Diagnosis Date   • Hyperlipidemia    • Hypertension        Past Surgical History:  Past Surgical History:   Procedure Laterality Date   • APPENDECTOMY     • HYSTERECTOMY     • TONSILLECTOMY     • WRIST SURGERY Right        Family History  History reviewed. No pertinent family history.    Social History  Social History     Socioeconomic History   • Marital status:    Tobacco Use   • Smoking status: Never   • Smokeless tobacco: Never   Substance and Sexual Activity   • Alcohol use: No   • Drug use: No   • Sexual activity: Defer         Review of Systems:  Review of system is unobtainable as she is confused/hard hearing and encephalopathy    Objective:  /47 (BP Location: Left arm, Patient Position: Lying)   Pulse 88   Temp 98.6 °F (37 °C) (Axillary)   Resp 16   Ht 175.3 cm (69\")   Wt 50.5 kg (111 lb 4.8 oz)   SpO2 90%   BMI 16.44 kg/m²     Intake/Output Summary (Last 24 hours) at 1/9/2023 1606  Last data filed at 1/9/2023 1300  Gross per 24 hour   Intake 0 ml   Output 2250 ml   Net -2250 ml     General: Awake/profoundly weak   HEENT: Normocephalic atraumatic head  Neck: Supple with no JVD accorded base.  Chest:  clear to auscultation bilaterally without " respiratory distress  CVS: regular rate and rhythm  Abdominal: soft, nontender, normal bowel sounds  Extremities: no cyanosis or edema  Skin: warm and dry without rash      Labs:    Results from last 7 days   Lab Units 01/09/23  0525 01/08/23  0323 01/07/23  0444   WBC 10*3/mm3 13.83* 14.36* 16.14*   HEMOGLOBIN g/dL 13.3 13.1 13.3   HEMATOCRIT % 44.8 43.7 44.8   PLATELETS 10*3/mm3 298 332 306       Results from last 7 days   Lab Units 01/09/23  1249 01/09/23  0525 01/08/23  2354 01/08/23  1228 01/08/23  0323 01/07/23  0444 01/06/23  1253 01/06/23  0904   SODIUM mmol/L 159* 153* 153*   < > 160* 152*   < > 140   POTASSIUM mmol/L 3.4* 3.3* 3.3*   < > 3.1* 4.0   < > 5.7*   CHLORIDE mmol/L 104 101 100   < > 105 105   < > 94*   CO2 mmol/L 46.0* 45.0* 44.0*   < > 44.0* 34.0*   < > 32.0*   BUN mg/dL 46* 44* 46*   < > 64* 91*   < > 100*   CREATININE mg/dL 1.66* 1.47* 1.54*   < > 1.87* 2.87*   < > 4.01*   CALCIUM mg/dL 9.7 9.3 9.3   < > 9.2 9.1   < > 10.1   BILIRUBIN mg/dL  --   --   --   --  0.2 0.3  --  0.3   ALK PHOS U/L  --   --   --   --  68 67  --  82   ALT (SGPT) U/L  --   --   --   --  12 15  --  21   AST (SGOT) U/L  --   --   --   --  14 17  --  22   GLUCOSE mg/dL 180* 135* 138*   < > 154* 121*   < > 172*   EGFR mL/min/1.73 30.9* 35.7* 33.8*   < > 26.8* 16.0*   < > 10.7*    < > = values in this interval not displayed.         Radiology:   Imaging Results (Last 24 Hours)     Procedure Component Value Units Date/Time    XR Chest 1 View [[id number]] Collected: 01/09/23 1429     Updated: 01/09/23 1434    Narrative:      EXAMINATION: XR CHEST 1 VW- 1/9/2023 2:29 PM CST     HISTORY: Worsening pulmonary status; K56.609-Unspecified intestinal  obstruction, unspecified as to partial versus complete obstruction;  Z78.9-Other specified health status; Z74.09-Other reduced mobility     REPORT: A frontal view the chest was obtained.     COMPARISON: Chest x-ray 01/05/2023.     The right lung is clear, there is partial consolidation  of the left base  with most of the left hemidiaphragm is obscured. There is also mild  elevation of left hemidiaphragm. The side-port and tip of the NG tube  appears to be at the gastric fundus level, satisfactory. No pneumothorax  is identified. A small left pleural effusion is not excluded. Contrast  is noted within the upper colon. Diffuse osteopenia is present. There  appear to be multiple chronic insufficiency compression deformities in  the thoracic spine.       Impression:      Continued consolidation of the medial left lung base with  obscuration of most of the left hemidiaphragm, which may represent  pneumonia or atelectasis. There also appears to be a small left pleural  effusion. The newly inserted NG tube appears to be in satisfactory  position.  This report was finalized on 01/09/2023 14:31 by Dr. Gilmer Santizo MD.    XR Abdomen KUB [[id number]] Collected: 01/09/23 0931     Updated: 01/09/23 1350    Narrative:      EXAMINATION: XR ABDOMEN KUB- 1/9/2023 9:31 AM CST     HISTORY: NG tube placement; K56.609-Unspecified intestinal obstruction,  unspecified as to partial versus complete obstruction.     REPORT: A supine view of the upper abdomen was obtained.     COMPARISON: KUB 01/09/2023 0338 hours.     The tip and side-port of the NG tube project over the gastric fundus in  satisfactory position. This is stable. There is moderate volume loss in  the left lung base. The visualized bowel gas pattern appears  nonobstructive. Moderately heavy calcified plaque is noted within the  abdominal aorta and iliac arteries.       Impression:      Stable satisfactory position of the NG tube with the tip and  side-port in the gastric fundus.  This report was finalized on 01/09/2023 09:33 by Dr. Gilmer Santizo MD.    FL Small Bowel Follow Through Single-Contrast [[id number]] Collected: 01/09/23 1238     Updated: 01/09/23 1244    Narrative:      EXAMINATION: FL SMALL BOWEL FOLLOW THROUGH SINGLE-CONTRAST- 1/9/2023  12:38  PM CST     HISTORY: Patient with small bowel obstruction please evaluate with a  small bowel follow-through can  complete through the NG tube;  K56.609-Unspecified intestinal obstruction, unspecified as to partial  versus complete obstruction.     REPORT: Small bowel follow-through was performed with administration of  contrast through the existing NG tube.     COMPARISON: KUB on same day.     After contrast was administered through the NG tube, images were  obtained at 15 minutes, 30 minutes and 2 hours. At 15 minutes, the  stomach is mostly opacified and there is contrast within the upper  abdominal loops of jejunum, which are mildly distended, measuring up to  4.2 cm. No jejunal mucosal thickening is identified. There is a small  duodenal diverticulum. At 30 minutes, most of the contrast is seen in  the jejunum and proximal ileum, without mass effect. At 2 hours,  contrast is seen throughout the small intestine and in the colon to the  level of the rectum. There is residual contrast in the stomach as well.       Impression:      Contrast reaches the rectum by 2 hours and there is no  evidence of small bowel obstruction.  This report was finalized on 01/09/2023 12:41 by Dr. Gilmer Santizo MD.    XR Abdomen KUB [[id number]] Collected: 01/09/23 0606     Updated: 01/09/23 0612    Narrative:      EXAMINATION: XR ABDOMEN KUB-     1/9/2023 3:30 AM CST     HISTORY: new NG tube placement verification; K56.609-Unspecified  intestinal obstruction, unspecified as to partial versus complete  obstruction     A frontal projection of the upper abdomen including the lower chest is  compared with the previous study dated 01/08/2023     There is interval minimal advancement of the gastric tube. The distal  sidehole now appears to be distal to the gastroesophageal junction. The  distal end of the tube is in the proximal stomach.     There is a left lower lung consolidation with complete obliteration left  diaphragm.     The limited  visualized abdomen show moderate gas and stool in the colon.  No evidence of dilatation or large bowel loops.     Both kidneys are obscured by the bowel contents. Small calcification is  seen projected over the right midabdomen medially. These may represent  calcified lymph nodes, granulomatous or calculi?. These are similar to  the previous study.     Severe atheromatous changes thoracic and abdominal aorta are noted.     No acute bony abnormality.       Impression:      1. Distal end of the nasogastric tube is in the proximal stomach. The  distal sidehole is adjacent and below the gastroesophageal junction.  This report was finalized on 01/09/2023 06:09 by Dr. Lisa Meng MD.          Culture:  No components found for: WOUNDCUL, 3  No components found for: CSFCUL, 3  No components found for: BC, 3  No components found for: URINECUL, 3      Assessment   1.  Acute kidney injury/improving.  2.  Intravascular volume depletion.  3.  Hypernatremia.  4.  Hypokalemia.  5.  Small bowel obstruction improving.  6.  Left lower lobe pneumonia.  7.  Combined systolic and diastolic CHF.  8.  Acute respiratory failure/pneumonia    Plan:  1.  Hypotonic IV fluid  2.  IV HydroDIURIL for correction of volume status and hypernatremia.  3.  Urinary electrolyte.  4.  Baseline renal ultrasound.  5.  Potassium replacement.      Thank you for the consult, we appreciate the opportunity to provide care to your patients.  Feel free to contact me if I can be of any further assistance.      Tayo Napoles MD  1/9/2023  16:06 CST

## 2023-01-09 NOTE — PROGRESS NOTES
Malnutrition Severity Assessment    Patient Name:  Susana Delcid  YOB: 1940  MRN: [id number]  Admit Date:  1/5/2023    Patient meets criteria for : Severe Malnutrition  Malnutrition Severity Assessment  Malnutrition Type: Chronic Disease - Related Malnutrition  Malnutrition Type (last 8 hours)     Malnutrition Severity Assessment     Row Name 01/09/23 1203       Malnutrition Severity Assessment    Malnutrition Type Chronic Disease - Related Malnutrition    Row Name 01/09/23 1203       Insufficient Energy Intake     Insufficient Energy Intake Findings Moderate    Insufficient Energy Intake  < or equal to 50% of est. energy requirement for > or equal to 5d)    Row Name 01/09/23 1203       Unintentional Weight Loss     Unintentional Weight Loss Findings Mild    Unintentional Weight Loss  Weight loss of 1-2% in one week    Row Name 01/09/23 1203       Muscle Loss    Loss of Muscle Mass Findings Moderate    Mu-ism Region Severe - deep hollowing/scooping, lack of muscle to touch, facial bones well defined    Clavicle Bone Region Severe - protruding prominent bone    Acromion Bone Region Severe - squared shoulders, bones, and acromion process protrusion prominent    Dorsal Hand Region Severe - prominent depression    Patellar Region Severe - prominent bone, square looking, very little muscle definition    Posterior Calf Region Severe - thin with very little definition/firmness    Row Name 01/09/23 1203       Fat Loss    Subcutaneous Fat Loss Findings Severe    Orbital Region  Severe - pronounced hollowness/depression, dark circles, loose saggy skin    Upper Arm Region Severe - mostly skin, very little space between folds, fingers touch    Thoracic & Lumbar Region Moderate - ribs visible with mild depressions, iliac crest somewhat prominent    Row Name 01/09/23 1203       Criteria Met (Must meet criteria for severity in at least 2 of these categories: M Wasting, Fat Loss, Fluid, Secondary Signs, Wt. Status,  Intake)    Patient meets criteria for  Severe Malnutrition                Electronically signed by:  Susan Mathur RD  01/09/23 12:06 CST

## 2023-01-10 ENCOUNTER — APPOINTMENT (OUTPATIENT)
Dept: ULTRASOUND IMAGING | Facility: HOSPITAL | Age: 83
DRG: 388 | End: 2023-01-10
Payer: MEDICARE

## 2023-01-10 PROBLEM — I50.43 ACUTE ON CHRONIC COMBINED SYSTOLIC AND DIASTOLIC CHF (CONGESTIVE HEART FAILURE) (HCC): Status: ACTIVE | Noted: 2023-01-10

## 2023-01-10 PROBLEM — J15.9 UNSPECIFIED BACTERIAL PNEUMONIA: Status: ACTIVE | Noted: 2023-01-10

## 2023-01-10 LAB
ALBUMIN SERPL-MCNC: 2.8 G/DL (ref 3.5–5.2)
ALBUMIN/GLOB SERPL: 0.8 G/DL
ALP SERPL-CCNC: 85 U/L (ref 39–117)
ALT SERPL W P-5'-P-CCNC: 20 U/L (ref 1–33)
ANION GAP SERPL CALCULATED.3IONS-SCNC: 10 MMOL/L (ref 5–15)
ANION GAP SERPL CALCULATED.3IONS-SCNC: 10 MMOL/L (ref 5–15)
ANION GAP SERPL CALCULATED.3IONS-SCNC: 6 MMOL/L (ref 5–15)
ANION GAP SERPL CALCULATED.3IONS-SCNC: 8 MMOL/L (ref 5–15)
AST SERPL-CCNC: 28 U/L (ref 1–32)
BACTERIA SPEC AEROBE CULT: NORMAL
BACTERIA SPEC AEROBE CULT: NORMAL
BASOPHILS # BLD AUTO: 0.03 10*3/MM3 (ref 0–0.2)
BASOPHILS NFR BLD AUTO: 0.3 % (ref 0–1.5)
BILIRUB SERPL-MCNC: 0.2 MG/DL (ref 0–1.2)
BUN SERPL-MCNC: 53 MG/DL (ref 8–23)
BUN SERPL-MCNC: 60 MG/DL (ref 8–23)
BUN SERPL-MCNC: 66 MG/DL (ref 8–23)
BUN SERPL-MCNC: 67 MG/DL (ref 8–23)
BUN/CREAT SERPL: 19.6 (ref 7–25)
BUN/CREAT SERPL: 22.5 (ref 7–25)
BUN/CREAT SERPL: 23.7 (ref 7–25)
BUN/CREAT SERPL: 24.1 (ref 7–25)
CALCIUM SPEC-SCNC: 9.2 MG/DL (ref 8.6–10.5)
CALCIUM SPEC-SCNC: 9.2 MG/DL (ref 8.6–10.5)
CALCIUM SPEC-SCNC: 9.5 MG/DL (ref 8.6–10.5)
CALCIUM SPEC-SCNC: 9.8 MG/DL (ref 8.6–10.5)
CHLORIDE SERPL-SCNC: 100 MMOL/L (ref 98–107)
CHLORIDE SERPL-SCNC: 101 MMOL/L (ref 98–107)
CHLORIDE SERPL-SCNC: 98 MMOL/L (ref 98–107)
CHLORIDE SERPL-SCNC: 99 MMOL/L (ref 98–107)
CO2 SERPL-SCNC: 43 MMOL/L (ref 22–29)
CO2 SERPL-SCNC: 45 MMOL/L (ref 22–29)
CO2 SERPL-SCNC: 46 MMOL/L (ref 22–29)
CO2 SERPL-SCNC: 47 MMOL/L (ref 22–29)
CREAT SERPL-MCNC: 2.24 MG/DL (ref 0.57–1)
CREAT SERPL-MCNC: 2.49 MG/DL (ref 0.57–1)
CREAT SERPL-MCNC: 2.93 MG/DL (ref 0.57–1)
CREAT SERPL-MCNC: 3.41 MG/DL (ref 0.57–1)
DEPRECATED RDW RBC AUTO: 52 FL (ref 37–54)
EGFRCR SERPLBLD CKD-EPI 2021: 13 ML/MIN/1.73
EGFRCR SERPLBLD CKD-EPI 2021: 15.6 ML/MIN/1.73
EGFRCR SERPLBLD CKD-EPI 2021: 19 ML/MIN/1.73
EGFRCR SERPLBLD CKD-EPI 2021: 21.5 ML/MIN/1.73
EOSINOPHIL # BLD AUTO: 0.03 10*3/MM3 (ref 0–0.4)
EOSINOPHIL NFR BLD AUTO: 0.3 % (ref 0.3–6.2)
ERYTHROCYTE [DISTWIDTH] IN BLOOD BY AUTOMATED COUNT: 14.9 % (ref 12.3–15.4)
GLOBULIN UR ELPH-MCNC: 3.4 GM/DL
GLUCOSE SERPL-MCNC: 135 MG/DL (ref 65–99)
GLUCOSE SERPL-MCNC: 144 MG/DL (ref 65–99)
GLUCOSE SERPL-MCNC: 175 MG/DL (ref 65–99)
GLUCOSE SERPL-MCNC: 213 MG/DL (ref 65–99)
HCT VFR BLD AUTO: 40.8 % (ref 34–46.6)
HGB BLD-MCNC: 11.8 G/DL (ref 12–15.9)
IMM GRANULOCYTES # BLD AUTO: 0.17 10*3/MM3 (ref 0–0.05)
IMM GRANULOCYTES NFR BLD AUTO: 1.4 % (ref 0–0.5)
LYMPHOCYTES # BLD AUTO: 1.5 10*3/MM3 (ref 0.7–3.1)
LYMPHOCYTES NFR BLD AUTO: 12.6 % (ref 19.6–45.3)
MCH RBC QN AUTO: 27 PG (ref 26.6–33)
MCHC RBC AUTO-ENTMCNC: 28.9 G/DL (ref 31.5–35.7)
MCV RBC AUTO: 93.4 FL (ref 79–97)
MONOCYTES # BLD AUTO: 1.14 10*3/MM3 (ref 0.1–0.9)
MONOCYTES NFR BLD AUTO: 9.5 % (ref 5–12)
NEUTROPHILS NFR BLD AUTO: 75.9 % (ref 42.7–76)
NEUTROPHILS NFR BLD AUTO: 9.08 10*3/MM3 (ref 1.7–7)
NRBC BLD AUTO-RTO: 0 /100 WBC (ref 0–0.2)
PLATELET # BLD AUTO: 310 10*3/MM3 (ref 140–450)
PMV BLD AUTO: 11.5 FL (ref 6–12)
POTASSIUM SERPL-SCNC: 3.7 MMOL/L (ref 3.5–5.2)
POTASSIUM SERPL-SCNC: 3.7 MMOL/L (ref 3.5–5.2)
POTASSIUM SERPL-SCNC: 3.8 MMOL/L (ref 3.5–5.2)
POTASSIUM SERPL-SCNC: 4.1 MMOL/L (ref 3.5–5.2)
PROT SERPL-MCNC: 6.2 G/DL (ref 6–8.5)
RBC # BLD AUTO: 4.37 10*6/MM3 (ref 3.77–5.28)
SODIUM SERPL-SCNC: 151 MMOL/L (ref 136–145)
SODIUM SERPL-SCNC: 153 MMOL/L (ref 136–145)
SODIUM SERPL-SCNC: 154 MMOL/L (ref 136–145)
SODIUM SERPL-SCNC: 155 MMOL/L (ref 136–145)
WBC NRBC COR # BLD: 11.95 10*3/MM3 (ref 3.4–10.8)

## 2023-01-10 PROCEDURE — 85025 COMPLETE CBC W/AUTO DIFF WBC: CPT | Performed by: INTERNAL MEDICINE

## 2023-01-10 PROCEDURE — 97110 THERAPEUTIC EXERCISES: CPT

## 2023-01-10 PROCEDURE — 80053 COMPREHEN METABOLIC PANEL: CPT | Performed by: INTERNAL MEDICINE

## 2023-01-10 PROCEDURE — 92526 ORAL FUNCTION THERAPY: CPT

## 2023-01-10 PROCEDURE — 76775 US EXAM ABDO BACK WALL LIM: CPT

## 2023-01-10 PROCEDURE — 94799 UNLISTED PULMONARY SVC/PX: CPT

## 2023-01-10 PROCEDURE — 94761 N-INVAS EAR/PLS OXIMETRY MLT: CPT

## 2023-01-10 PROCEDURE — 25010000002 CEFTRIAXONE PER 250 MG: Performed by: INTERNAL MEDICINE

## 2023-01-10 PROCEDURE — 97530 THERAPEUTIC ACTIVITIES: CPT

## 2023-01-10 PROCEDURE — 25010000002 AZITHROMYCIN PER 500 MG: Performed by: INTERNAL MEDICINE

## 2023-01-10 RX ORDER — CARVEDILOL 6.25 MG/1
12.5 TABLET ORAL 2 TIMES DAILY WITH MEALS
Status: DISCONTINUED | OUTPATIENT
Start: 2023-01-10 | End: 2023-01-15

## 2023-01-10 RX ORDER — DEXTROSE MONOHYDRATE 50 MG/ML
75 INJECTION, SOLUTION INTRAVENOUS CONTINUOUS
Status: DISCONTINUED | OUTPATIENT
Start: 2023-01-10 | End: 2023-01-13

## 2023-01-10 RX ADMIN — IPRATROPIUM BROMIDE 0.5 MG: 0.5 SOLUTION RESPIRATORY (INHALATION) at 20:58

## 2023-01-10 RX ADMIN — IPRATROPIUM BROMIDE 0.5 MG: 0.5 SOLUTION RESPIRATORY (INHALATION) at 14:00

## 2023-01-10 RX ADMIN — Medication 10 ML: at 21:50

## 2023-01-10 RX ADMIN — CEFTRIAXONE 1 G: 1 INJECTION, POWDER, FOR SOLUTION INTRAMUSCULAR; INTRAVENOUS at 21:49

## 2023-01-10 RX ADMIN — METOPROLOL TARTRATE 5 MG: 5 INJECTION INTRAVENOUS at 06:19

## 2023-01-10 RX ADMIN — ALBUTEROL SULFATE 1.25 MG: 2.5 SOLUTION RESPIRATORY (INHALATION) at 06:37

## 2023-01-10 RX ADMIN — ALBUTEROL SULFATE 1.25 MG: 2.5 SOLUTION RESPIRATORY (INHALATION) at 14:00

## 2023-01-10 RX ADMIN — CARVEDILOL 12.5 MG: 6.25 TABLET, FILM COATED ORAL at 17:53

## 2023-01-10 RX ADMIN — IPRATROPIUM BROMIDE 0.5 MG: 0.5 SOLUTION RESPIRATORY (INHALATION) at 10:24

## 2023-01-10 RX ADMIN — ALBUTEROL SULFATE 1.25 MG: 2.5 SOLUTION RESPIRATORY (INHALATION) at 10:24

## 2023-01-10 RX ADMIN — ALBUTEROL SULFATE 1.25 MG: 2.5 SOLUTION RESPIRATORY (INHALATION) at 20:58

## 2023-01-10 RX ADMIN — AZITHROMYCIN MONOHYDRATE 500 MG: 500 INJECTION, POWDER, LYOPHILIZED, FOR SOLUTION INTRAVENOUS at 00:02

## 2023-01-10 RX ADMIN — METOPROLOL TARTRATE 5 MG: 5 INJECTION INTRAVENOUS at 00:08

## 2023-01-10 RX ADMIN — IPRATROPIUM BROMIDE 0.5 MG: 0.5 SOLUTION RESPIRATORY (INHALATION) at 06:37

## 2023-01-10 RX ADMIN — DEXTROSE MONOHYDRATE 100 ML/HR: 50 INJECTION, SOLUTION INTRAVENOUS at 11:54

## 2023-01-10 NOTE — PLAN OF CARE
Goal Outcome Evaluation:  Plan of Care Reviewed With: patient        Progress: improving  Outcome Evaluation: VSS.  BP controlled.  S 72-97 with PVC/PAC/BBB on tele.  No indicators of pain.  Pt only pulled off nc once.  Redirected.  Cushions applied to earpiece of nc for skin protection.  Little urine output this shift; urine contaminated and unable to use for urine specimen.  Turned q2h.  Pt more interactive tonight compared to previous night.  Continues to be confused.  New iv placed after existing iv infiltrated.  IV ABX infused.  Safety maintained.

## 2023-01-10 NOTE — PROGRESS NOTES
[facility]  Progress Note  Patient Name: Susana Delcid  Date of Admission: 1/5/2023  Today's Date: 01/10/23     Code Status and Medical Interventions:   Ordered at: 01/05/23 2218     Level Of Support Discussed With:    Patient     Code Status (Patient has no pulse and is not breathing):    CPR (Attempt to Resuscitate)     Medical Interventions (Patient has pulse or is breathing):    Full Support     Subjective   Chief complaint/Reason for Referral/Visit: Follow up on Chart review.    Medical record reviewed. Events noted.  Underwent small bowel follow-through on 1/9/2023 which revealed no evidence of SBO.  NG tube removed and her diet was advanced.  SLP evaluated and recommended nectar thickened liquids secondary to oropharyngeal dysphagia.  Nephrology consulted due to hypernatremia and made recommendations including IV HydroDIURIL.  Labs collected this morning reveals sodium is improved to 153.  Additional doses being held at this time due to increasing creatinine.  Anticipate she will return to SNF once medically stable.     Advanced Directives: Guardianship paper on file naming Margarito Steel as her guardian.     Objective   Diagnostics: Reviewed      Intake/Output Summary (Last 24 hours) at 1/10/2023 1136  Last data filed at 1/10/2023 1000  Gross per 24 hour   Intake 832 ml   Output 580 ml   Net 252 ml     Current Facility-Administered Medications   Medication Dose Route Frequency Provider Last Rate Last Admin   • acetaminophen (TYLENOL) tablet 650 mg  650 mg Oral Q4H PRN Dony Samuels, DO       • albuterol (PROVENTIL) nebulizer solution 0.5% 2.5 mg/0.5mL  1.25 mg Nebulization 4x Daily - RT Dony Samuels DO   1.25 mg at 01/10/23 1024    And   • ipratropium (ATROVENT) nebulizer solution 0.5 mg  0.5 mg Nebulization 4x Daily - RT Dony Samuels DO   0.5 mg at 01/10/23 1024   • azithromycin (ZITHROMAX) 500 mg in sodium chloride 0.9 % 250 mL IVPB-VTB  500 mg  "Intravenous Q24H Dony Samuels DO   500 mg at 01/10/23 0002   • cefTRIAXone (ROCEPHIN) 1 g in sodium chloride 0.9 % 100 mL IVPB-VTB  1 g Intravenous Q24H Dony Samuels  mL/hr at 01/09/23 2323 Restarted at 01/09/23 2323   • dextrose (D5W) 5 % infusion  100 mL/hr Intravenous Continuous Flavio Jaquez APRN       • HYDROmorphone (DILAUDID) injection 0.25 mg  0.25 mg Intravenous Q4H PRN Dony Samuels DO       • metoprolol tartrate (LOPRESSOR) injection 5 mg  5 mg Intravenous Q6H Seamus Ruvalcaba APRN   5 mg at 01/10/23 0619   • ondansetron (ZOFRAN) injection 4 mg  4 mg Intravenous Q6H PRN Dony Samuels DO       • silver sulfadiazine (SILVADENE, SSD) 1 % cream   Topical Q12H PRN Dony Samuels DO       • sodium chloride 0.9 % flush 10 mL  10 mL Intravenous Q12H Dony Samuels DO   10 mL at 01/09/23 2125   • sodium chloride 0.9 % flush 10 mL  10 mL Intravenous PRN Dony Samuels DO       • sodium chloride 0.9 % infusion 40 mL  40 mL Intravenous PRN Dony Samuels DO       • sodium chloride nasal spray 2 spray  2 spray Each Nare PRN Claudio Cobb MD   2 spray at 01/08/23 1634     dextrose, 100 mL/hr      •  acetaminophen  •  HYDROmorphone  •  ondansetron  •  silver sulfadiazine  •  sodium chloride  •  sodium chloride  •  sodium chloride    Assessment:  Vital Signs: /49 (BP Location: Left arm, Patient Position: Lying)   Pulse 77   Temp 97.5 °F (36.4 °C) (Oral)   Resp 16   Ht 175.3 cm (69\")   Wt 49.5 kg (109 lb 1.6 oz) Comment: 1 blanket, 1 pillow, patient unable to stand ; KAYLI Tobin aware  SpO2 95%   BMI 16.11 kg/m²     Functional status: Palliative Performance Scale Score: Performance 30% based on the following measures: Ambulation: Totally bed bound, Activity and Evidence of Disease: Unable to do any work, extensive evidence of disease, Self-Care: Total care required,  Intake: Reduced, LOC: Full, drowsy or confusion.  Nutritional status: Albumin 2.8. " "Body mass index is 16.11 kg/m².   Patient status: Disease state: Controlled with current treatments.    Impression/Problem List:  1. Small bowel obstruction - Resolved  2. Acute kidney injury  3. Hypernatremia  4. Acute respiratory failure with hypoxia  5. Underweight (BMI 16.11)  6. Pneumonia of LLL   7. Pleural effusion, left  8. Leukocytosis  9. Essential hypertension  10. Advanced age  11. Chronic combined systolic and diastolic CHF - Per echocardiogram 2018   12. Impaired hearing         Plan / Recommendations     1. Palliative Care Encounter   - Goals of care include CODE STATUS CPR with full support interventions.    - Prognosis is guarded long-term secondary to LETITIA, hyponatremia, acute respiratory failure with hypoxia, underweight, pneumonia of LLL, pleural effusion, left, advanced age, SBO and other comorbidities listed above.    - Discussed condition with patients guardian, Margarito Steel, on 1/6/2022 and he expressed plans to continue \"all possible assistance/interventions and until doctors determine she is beyond their medical science to prolong life or to the point that medical science and interventions to prolong life causes more harm than good full support measures should be continued.\"     - Appears to have made some improvements given resolution of SBO and removal of NG tube.    Thank you for allowing us to participate in patient's plan of care. Palliative Care Team will continue to follow patient. Will plan to follow up as needed.    Time spent:20 minutes spent reviewing medical and medication records, assessing and examining patient, discussing with family, answering questions, providing some guidance about a plan and documentation of care, and coordinating care with other healthcare members, with > 50% time spent face to face.   Electronically signed by, JAMIR Beaver, 01/10/23.    "

## 2023-01-10 NOTE — PROGRESS NOTES
Nephrology ([facility]) Progress Note      Patient:  Susana Delcid  YOB: 1940  Date of Service: 1/10/2023  MRN: [id number]   Acct: [id number]   Primary Care Physician: Provider, No Known  Advance Directive:   Code Status and Medical Interventions:   Ordered at: 01/05/23 9621     Level Of Support Discussed With:    Patient     Code Status (Patient has no pulse and is not breathing):    CPR (Attempt to Resuscitate)     Medical Interventions (Patient has pulse or is breathing):    Full Support     Admit Date: 1/5/2023       Hospital Day: 5  Referring Provider: Dony Samuels, DO      Patient personally seen and examined.  Complete chart including Consults, Notes, Operative Reports, Labs, Cardiology, and Radiology studies reviewed as able.        Subjective:  Susana Delcid is a 82 y.o. female for whom we were consulted for evaluation and treatment of acute kidney injury, hypernatremia, hypokalemia.  History of dementia.  [place] [facility]  Brought to [facility] with nausea and vomiting on 1/05.  Imaging revealed small bowel obstruction and pneumonia. Creatinine was 3.4. Hospital course remarkable for some improvement of renal function and for some fluctuation of sodium and potassium levels.    Today is awake, confused. No apparent distress.    Allergies:  Aspirin, Codeine, and Motrin [ibuprofen]    Home Meds:  Medications Prior to Admission   Medication Sig Dispense Refill Last Dose   • acetaminophen (TYLENOL) 325 MG tablet Take 650 mg by mouth Every 6 (Six) Hours As Needed for Mild Pain .   Past Week   • ARIPiprazole (ABILIFY) 5 MG tablet Take 5 mg by mouth Daily.   1/5/2023   • carvedilol (COREG) 12.5 MG tablet Take 12.5 mg by mouth 2 (Two) Times a Day With Meals.   1/5/2023   • cholecalciferol (VITAMIN D3) 1.25 MG (92339 UT) capsule Take 50,000 Units by mouth 1 (One) Time Per Week. One time a day every Tue for supplement   Past Week   • cloNIDine (CATAPRES) 0.1 MG  tablet Take 0.1 mg by mouth Daily As Needed for High Blood Pressure. 1 tablet every 24 hours as needed for high BP for SBP greater than 180, DBP greater than 90   Past Month   • furosemide (LASIX) 40 MG tablet Take 40 mg by mouth Daily.   1/5/2023   • hydrALAZINE (APRESOLINE) 50 MG tablet Take 1 tablet by mouth Every 8 (Eight) Hours.   1/5/2023   • isosorbide dinitrate (ISORDIL) 20 MG tablet Take 1 tablet by mouth Every 8 (Eight) Hours.   1/5/2023   • potassium chloride (MICRO-K) 10 MEQ CR capsule Take 4 capsules by mouth Daily.   1/5/2023   • PARoxetine (PAXIL) 10 MG tablet Take 10 mg by mouth Every Morning.   Unknown       Medicines:  Current Facility-Administered Medications   Medication Dose Route Frequency Provider Last Rate Last Admin   • acetaminophen (TYLENOL) tablet 650 mg  650 mg Oral Q4H PRN Dony Samuels DO       • albuterol (PROVENTIL) nebulizer solution 0.5% 2.5 mg/0.5mL  1.25 mg Nebulization 4x Daily - RT Dony Samuels DO   1.25 mg at 01/10/23 1024    And   • ipratropium (ATROVENT) nebulizer solution 0.5 mg  0.5 mg Nebulization 4x Daily - RT Dony Samuels DO   0.5 mg at 01/10/23 1024   • azithromycin (ZITHROMAX) 500 mg in sodium chloride 0.9 % 250 mL IVPB-VTB  500 mg Intravenous Q24H Dony Samuels DO   500 mg at 01/10/23 0002   • cefTRIAXone (ROCEPHIN) 1 g in sodium chloride 0.9 % 100 mL IVPB-VTB  1 g Intravenous Q24H Dony Samuels  mL/hr at 01/09/23 2323 Restarted at 01/09/23 2323   • dextrose 5 % 985 mL with potassium chloride 30 mEq infusion   Intravenous Continuous Tayo Napoles  mL/hr at 01/09/23 2356 Restarted at 01/09/23 2356   • HYDROmorphone (DILAUDID) injection 0.25 mg  0.25 mg Intravenous Q4H PRN Dony Samuels DO       • metoprolol tartrate (LOPRESSOR) injection 5 mg  5 mg Intravenous Q6H Seamus Ruvalcaba APRN   5 mg at 01/10/23 0619   • ondansetron (ZOFRAN) injection 4 mg  4 mg Intravenous Q6H PRN Dony Samuels DO       • silver  sulfadiazine (SILVADENE, SSD) 1 % cream   Topical Q12H PRN Dony Samuels DO       • sodium chloride 0.9 % flush 10 mL  10 mL Intravenous Q12H Dony Samuels DO   10 mL at 01/09/23 2125   • sodium chloride 0.9 % flush 10 mL  10 mL Intravenous PRN Dony Samuels DO       • sodium chloride 0.9 % infusion 40 mL  40 mL Intravenous PRN Dony Samuels DO       • sodium chloride nasal spray 2 spray  2 spray Each Nare PRN Claudio Cobb MD   2 spray at 01/08/23 1634       Past Medical History:  Past Medical History:   Diagnosis Date   • Hyperlipidemia    • Hypertension        Past Surgical History:  Past Surgical History:   Procedure Laterality Date   • APPENDECTOMY     • HYSTERECTOMY     • TONSILLECTOMY     • WRIST SURGERY Right        Family History  History reviewed. No pertinent family history.    Social History  Social History     Socioeconomic History   • Marital status:    Tobacco Use   • Smoking status: Never   • Smokeless tobacco: Never   Substance and Sexual Activity   • Alcohol use: No   • Drug use: No   • Sexual activity: Defer       Review of Systems:  History obtained from chart review and the patient  General ROS: No fever or chills  Respiratory ROS: No cough, shortness of breath, wheezing  Cardiovascular ROS: No chest pain or palpitations  Gastrointestinal ROS: No abdominal pain or melena  Genito-Urinary ROS: No dysuria or hematuria  Psych ROS: No anxiety and depression  14 point ROS reviewed with the patient and negative except as noted above and in the HPI unless unable to obtain.    Objective:  Patient Vitals for the past 24 hrs:   BP Temp Temp src Pulse Resp SpO2 Weight   01/10/23 1030 -- -- -- 77 16 95 % --   01/10/23 1024 -- -- -- 77 16 95 % --   01/10/23 0734 128/49 97.5 °F (36.4 °C) Oral 85 18 100 % --   01/10/23 0659 -- -- -- -- -- 100 % --   01/10/23 0645 -- -- -- 59 18 99 % --   01/10/23 0637 -- -- -- 62 16 98 % --   01/10/23 0617 112/48 -- -- 64 -- -- --   01/10/23  0427 119/51 97.8 °F (36.6 °C) Axillary 90 18 92 % --   01/10/23 0328 -- -- -- 78 16 97 % --   01/10/23 0035 -- -- -- 79 16 96 % --   01/10/23 0008 101/46 -- -- 88 -- -- --   01/09/23 2037 108/49 98.4 °F (36.9 °C) Axillary 89 16 93 % 49.5 kg (109 lb 1.6 oz)   01/09/23 2005 -- -- -- 98 16 100 % --   01/09/23 1509 111/47 98.6 °F (37 °C) Axillary 88 16 90 % --   01/09/23 1459 -- -- -- 77 16 90 % --   01/09/23 1453 -- -- -- 83 20 92 % --       Intake/Output Summary (Last 24 hours) at 1/10/2023 1120  Last data filed at 1/10/2023 1000  Gross per 24 hour   Intake 832 ml   Output 580 ml   Net 252 ml     General: awake/alert   Chest:  clear to auscultation bilaterally without respiratory distress  CVS: regular rate and rhythm  Abdominal: soft, nontender, positive bowel sounds  Extremities: no cyanosis or edema  Skin: warm and dry without rash      Labs:  Results from last 7 days   Lab Units 01/10/23  0457 01/09/23  0525 01/08/23  0323   WBC 10*3/mm3 11.95* 13.83* 14.36*   HEMOGLOBIN g/dL 11.8* 13.3 13.1   HEMATOCRIT % 40.8 44.8 43.7   PLATELETS 10*3/mm3 310 298 332         Results from last 7 days   Lab Units 01/10/23  0456 01/09/23  2346 01/09/23  1743 01/08/23  1228 01/08/23  0323 01/07/23  0444   SODIUM mmol/L 153* 154* 158*   < > 160* 152*   POTASSIUM mmol/L 3.7 3.8 3.2*   < > 3.1* 4.0   CHLORIDE mmol/L 101 99 103   < > 105 105   CO2 mmol/L 46.0* 47.0* 48.0*   < > 44.0* 34.0*   BUN mg/dL 60* 53* 49*   < > 64* 91*   CREATININE mg/dL 2.49* 2.24* 1.75*   < > 1.87* 2.87*   CALCIUM mg/dL 9.5 9.8 9.7   < > 9.2 9.1   EGFR mL/min/1.73 19.0* 21.5* 29.0*   < > 26.8* 16.0*   BILIRUBIN mg/dL 0.2  --   --   --  0.2 0.3   ALK PHOS U/L 85  --   --   --  68 67   ALT (SGPT) U/L 20  --   --   --  12 15   AST (SGOT) U/L 28  --   --   --  14 17   GLUCOSE mg/dL 175* 213* 131*   < > 154* 121*    < > = values in this interval not displayed.       Radiology:   Imaging Results (Last 72 Hours)     Procedure Component Value Units Date/Time    US  Renal Bilateral [[id number]] Resulted: 01/10/23 1041     Updated: 01/10/23 1105    XR Chest 1 View [[id number]] Collected: 01/09/23 1429     Updated: 01/09/23 1434    Narrative:      EXAMINATION: XR CHEST 1 VW- 1/9/2023 2:29 PM CST     HISTORY: Worsening pulmonary status; K56.609-Unspecified intestinal  obstruction, unspecified as to partial versus complete obstruction;  Z78.9-Other specified health status; Z74.09-Other reduced mobility     REPORT: A frontal view the chest was obtained.     COMPARISON: Chest x-ray 01/05/2023.     The right lung is clear, there is partial consolidation of the left base  with most of the left hemidiaphragm is obscured. There is also mild  elevation of left hemidiaphragm. The side-port and tip of the NG tube  appears to be at the gastric fundus level, satisfactory. No pneumothorax  is identified. A small left pleural effusion is not excluded. Contrast  is noted within the upper colon. Diffuse osteopenia is present. There  appear to be multiple chronic insufficiency compression deformities in  the thoracic spine.       Impression:      Continued consolidation of the medial left lung base with  obscuration of most of the left hemidiaphragm, which may represent  pneumonia or atelectasis. There also appears to be a small left pleural  effusion. The newly inserted NG tube appears to be in satisfactory  position.  This report was finalized on 01/09/2023 14:31 by Dr. Gilmer Santizo MD.    XR Abdomen KUB [[id number]] Collected: 01/09/23 0931     Updated: 01/09/23 1350    Narrative:      EXAMINATION: XR ABDOMEN KUB- 1/9/2023 9:31 AM CST     HISTORY: NG tube placement; K56.609-Unspecified intestinal obstruction,  unspecified as to partial versus complete obstruction.     REPORT: A supine view of the upper abdomen was obtained.     COMPARISON: KUB 01/09/2023 0338 hours.     The tip and side-port of the NG tube project over the gastric fundus in  satisfactory position. This is stable. There is  moderate volume loss in  the left lung base. The visualized bowel gas pattern appears  nonobstructive. Moderately heavy calcified plaque is noted within the  abdominal aorta and iliac arteries.       Impression:      Stable satisfactory position of the NG tube with the tip and  side-port in the gastric fundus.  This report was finalized on 01/09/2023 09:33 by Dr. Gilmer Santizo MD.    FL Small Bowel Follow Through Single-Contrast [[id number]] Collected: 01/09/23 1238     Updated: 01/09/23 1244    Narrative:      EXAMINATION: FL SMALL BOWEL FOLLOW THROUGH SINGLE-CONTRAST- 1/9/2023  12:38 PM CST     HISTORY: Patient with small bowel obstruction please evaluate with a  small bowel follow-through can  complete through the NG tube;  K56.609-Unspecified intestinal obstruction, unspecified as to partial  versus complete obstruction.     REPORT: Small bowel follow-through was performed with administration of  contrast through the existing NG tube.     COMPARISON: KUB on same day.     After contrast was administered through the NG tube, images were  obtained at 15 minutes, 30 minutes and 2 hours. At 15 minutes, the  stomach is mostly opacified and there is contrast within the upper  abdominal loops of jejunum, which are mildly distended, measuring up to  4.2 cm. No jejunal mucosal thickening is identified. There is a small  duodenal diverticulum. At 30 minutes, most of the contrast is seen in  the jejunum and proximal ileum, without mass effect. At 2 hours,  contrast is seen throughout the small intestine and in the colon to the  level of the rectum. There is residual contrast in the stomach as well.       Impression:      Contrast reaches the rectum by 2 hours and there is no  evidence of small bowel obstruction.  This report was finalized on 01/09/2023 12:41 by Dr. Gilmer Santizo MD.    XR Abdomen KUB [[id number]] Collected: 01/09/23 0606     Updated: 01/09/23 0612    Narrative:      EXAMINATION: XR ABDOMEN KUB-      1/9/2023 3:30 AM CST     HISTORY: new NG tube placement verification; K56.609-Unspecified  intestinal obstruction, unspecified as to partial versus complete  obstruction     A frontal projection of the upper abdomen including the lower chest is  compared with the previous study dated 01/08/2023     There is interval minimal advancement of the gastric tube. The distal  sidehole now appears to be distal to the gastroesophageal junction. The  distal end of the tube is in the proximal stomach.     There is a left lower lung consolidation with complete obliteration left  diaphragm.     The limited visualized abdomen show moderate gas and stool in the colon.  No evidence of dilatation or large bowel loops.     Both kidneys are obscured by the bowel contents. Small calcification is  seen projected over the right midabdomen medially. These may represent  calcified lymph nodes, granulomatous or calculi?. These are similar to  the previous study.     Severe atheromatous changes thoracic and abdominal aorta are noted.     No acute bony abnormality.       Impression:      1. Distal end of the nasogastric tube is in the proximal stomach. The  distal sidehole is adjacent and below the gastroesophageal junction.  This report was finalized on 01/09/2023 06:09 by Dr. Lisa Meng MD.    XR Abdomen KUB [[id number]] Collected: 01/08/23 1543     Updated: 01/08/23 1547    Narrative:      XR ABDOMEN KUB- 1/8/2023 3:38 PM CST     HISTORY: PLACEMENT OF NEW NG TUBE; K56.609-Unspecified intestinal  obstruction, unspecified as to partial versus complete obstruction     FINDINGS:  Feeding tube is seen with the tip located in the upper  gastric body. Catheter sidehole remains at the gastroesophageal  junction.     No gross intraperitoneal free air. Atelectasis in the left lung base.  Right lung base is clear.          Impression:      Feeding tube tip located in the upper gastric body. Catheter  sidehole remains at the gastroesophageal  junction. Consider additional  slight advancement.     This report was finalized on 01/08/2023 15:44 by Dr Joe Perez, .    XR Abdomen Flat & Upright [[id number]] Collected: 01/08/23 0838     Updated: 01/08/23 0844    Narrative:      EXAM/TECHNIQUE: XR ABDOMEN FLAT AND UPRIGHT-     INDICATION: Small bowel obstruction; K56.609-Unspecified intestinal  obstruction, unspecified as to partial versus complete obstruction     COMPARISON: 01/07/2023     FINDINGS:     Slight decrease in diffuse small bowel distention with loops measuring  up to 3.5 cm diameter (previously 4 cm). Multiple bowel loops in the  LEFT abdomen that were previously dilated, no longer appear dilated. No  visible pneumatosis or portal venous gas. Enteric tube is in the mid  stomach. LEFT lower lobe consolidation is again noted. Degenerative  change in the lumbar spine. Multiple pelvic embolus and atherosclerotic  vascular calcifications.       Impression:         Slight decrease in persistent small bowel distention.  This report was finalized on 01/08/2023 08:40 by Dr. Zan Cantor MD.    XR Abdomen Flat & Upright [[id number]] Collected: 01/07/23 1452     Updated: 01/07/23 1457    Narrative:      EXAM/TECHNIQUE: XR ABDOMEN FLAT AND UPRIGHT-     INDICATION: Follow-up small bowel obstruction KUB flat and upright;  K56.609-Unspecified intestinal obstruction, unspecified as to partial  versus complete obstruction     COMPARISON: 01/06/2023     FINDINGS:     Enteric tube is in the mid stomach. Gaseous bowel distention persists  but appears decreased. Small bowel loops measure up to 4 cm (previously  5.5 cm). No visible pneumatosis or portal venous gas. Moderate to large  volume stool in the rectum. Redemonstrated LEFT lower lobe  consolidation. No acute osseous finding.       Impression:         1.  Persistent but decreased gaseous bowel distention.  2.  Feeding tube tip is in the mid stomach.  This report was finalized on 01/07/2023 14:54 by Dr. Zuluaga  MD Devaughn.          Culture:  Blood Culture   Date Value Ref Range Status   01/05/2023 No growth at 4 days  Preliminary   01/05/2023 No growth at 4 days  Preliminary         Assessment   1.  Acute kidney injury, worse today  2.  Hypernatremia--improving  3.  Hypokalemia--improved  4.  Small bowel obstruction  5.  LLL pneumonia  6.  Combined systolic/diastolic CHF    Plan:  1.  Excellent response to diuretics. Will hold additional doses for now due to increased creatinine  2.  Continue hypotonic fluids  3.  Monitor labs      Flavio Jaquez, APRN  1/10/2023  11:20 CST

## 2023-01-10 NOTE — THERAPY TREATMENT NOTE
Acute Care - Physical Therapy Treatment Note  [facility]     Patient Name: Susana Delcid  : 1940  MRN: [id number]  Today's Date: 1/10/2023      Visit Dx:     ICD-10-CM ICD-9-CM   1. Small bowel obstruction (HCC)  K56.609 560.9   2. Decreased activities of daily living (ADL)  Z78.9 V49.89   3. Impaired mobility  Z74.09 799.89   4. Dysphagia, unspecified type  R13.10 787.20     Patient Active Problem List   Diagnosis   • Acute systolic congestive heart failure (HCC)   • Elevated troponin level   • Iron deficiency anemia   • Tachycardia   • Elevated d-dimer   • UTI (urinary tract infection)   • Essential hypertension   • Mixed hyperlipidemia   • Pleural effusion, bilateral   • SBO (small bowel obstruction) (HCC)   • LETITIA (acute kidney injury) (HCC)   • Stage 3b chronic kidney disease (CKD) (HCC)   • Left lower lobe pneumonia   • Hyperkalemia   • Chronic combined systolic (congestive) and diastolic (congestive) heart failure (HCC)   • Acute respiratory failure with hypoxia (HCC)   • Unspecified bacterial pneumonia   • Acute on chronic combined systolic and diastolic CHF (congestive heart failure) (HCC)     Past Medical History:   Diagnosis Date   • Hyperlipidemia    • Hypertension      Past Surgical History:   Procedure Laterality Date   • APPENDECTOMY     • HYSTERECTOMY     • TONSILLECTOMY     • WRIST SURGERY Right      PT Assessment (last 12 hours)     PT Evaluation and Treatment     Row Name 01/10/23 0804          Physical Therapy Time and Intention    Document Type therapy note (daily note)  -TB     Mode of Treatment physical therapy  -TB     Comment Pt very confused  -TB     Row Name 01/10/23 0804          General Information    Existing Precautions/Restrictions oxygen therapy device and L/min;fall  Vapotherm  -TB     Row Name 01/10/23 0804          Bed Mobility    Bed Mobility rolling left;rolling right;scooting/bridging;supine-sit;sit-supine  -TB     Rolling Left [place] (Bed Mobility) minimum assist  "(75% patient effort);moderate assist (50% patient effort);verbal cues  -TB     Rolling Right [place] (Bed Mobility) minimum assist (75% patient effort);moderate assist (50% patient effort);verbal cues  -TB     Scooting/Bridging [place] (Bed Mobility) maximum assist (25% patient effort);verbal cues  -TB     Assistive Device (Bed Mobility) bed rails;draw sheet  -TB     Row Name 01/10/23 0804          Motor Skills    Therapeutic Exercise --  AAROM-PROM BLE 2x15  -TB     Row Name 01/10/23 0804          Plan of Care Review    Plan of Care Reviewed With patient  -TB     Outcome Evaluation Pt lying in bed w/ no complaints. Pt kept saying \"no, no\". When asked what was wrong pt wouldn't say anything other than \"no\". Performed AAROM-PROM 2x15 w/ rest breaks as needed. Pt followed less than 50% of 1 step commands. Exercises were mostly passive. Practice rolling in bed w/ Min-Mod A. Pt assisted but still neded cues for technique and completion of task. Scoot pt up in Max A in [facility]. Attempted to set up pts breakfast tray but she kept pushing the table away. Tried to give pt a bite of yoggurt and she spit it out. Let w/ exit alram on. RN notified.  -TB     Row Name 01/10/23 0804          Positioning and Restraints    Pre-Treatment Position in bed  -TB     Post Treatment Position bed  -TB     In Bed fowlers;call light within reach;encouraged to call for assist;exit alarm on;side rails up x2  -TB           User Key  (r) = Recorded By, (t) = Taken By, (c) = Cosigned By    Initials Name Provider Type    TB Bina Chun, PTA Physical Therapist Assistant                Physical Therapy Education     Title: PT OT SLP Therapies (In Progress)     Topic: Physical Therapy (In Progress)     Point: Mobility training (In Progress)     Learning Progress Summary           Patient Acceptance, E, NR by ALEC at 1/9/2023 0929    Comment: benefits of activity, progression of PT POC                   Point: Home exercise " "program (Not Started)     Learner Progress:  Not documented in this visit.          Point: Body mechanics (Not Started)     Learner Progress:  Not documented in this visit.          Point: Precautions (Not Started)     Learner Progress:  Not documented in this visit.                      User Key     Initials Effective Dates Name Provider Type Discipline    AL[facility] 08/02/16 -  Fabian Pena, PT DPT Physical Therapist PT              PT Recommendation and Plan     Plan of Care Reviewed With: patient  Outcome Evaluation: Pt lying in bed w/ no complaints. Pt kept saying \"no, no\". When asked what was wrong pt wouldn't say anything other than \"no\". Performed AAROM-PROM 2x15 w/ rest breaks as needed. Pt followed less than 50% of 1 step commands. Exercises were mostly passive. Practice rolling in bed w/ Min-Mod A. Pt assisted but still neded cues for technique and completion of task. Scoot pt up in Max A in [facility]. Attempted to set up pts breakfast tray but she kept pushing the table away. Tried to give pt a bite of yoggurt and she spit it out. Let w/ exit alram on. RN notified.   Outcome Measures     Row Name 01/10/23 0804             How much help from another person do you currently need...    Turning from your back to your side while in flat bed without using bedrails? 2  -TB      Moving from lying on back to sitting on the side of a flat bed without bedrails? 2  -TB      Moving to and from a bed to a chair (including a wheelchair)? 1  -TB      Standing up from a chair using your arms (e.g., wheelchair, bedside chair)? 1  -TB      Climbing 3-5 steps with a railing? 1  -TB      To walk in hospital room? 1  -TB      AM-PAC 6 Clicks Score (PT) 8  -TB         Functional Assessment    Outcome Measure Options AM-PAC 6 Clicks Basic Mobility (PT)  -TB            User Key  (r) = Recorded By, (t) = Taken By, (c) = Cosigned By    Initials Name Provider Type    TB Bina Chun, PTA Physical Therapist Assistant       "           Time Calculation:    PT Charges     Row Name 01/10/23 1128             Time Calculation    Start Time 0804  -TB      Stop Time 0842  -TB      Time Calculation (min) 38 min  -TB      PT Received On 01/10/23  -TB         Time Calculation- PT    Total Timed Code Minutes- PT 38 minute(s)  -TB            User Key  (r) = Recorded By, (t) = Taken By, (c) = Cosigned By    Initials Name Provider Type    TB Bina Chun, CARLOS Physical Therapist Assistant              Therapy Charges for Today     Code Description Service Date Service Provider Modifiers Qty    88936386676 HC PT THERAPEUTIC ACT EA 15 MIN 1/10/2023 Bina Chun, CARLOS GP 1    06523569337 HC PT THER PROC EA 15 MIN 1/10/2023 Bina Chun, CARLOS GP 2          PT G-Codes  Outcome Measure Options: AM-PAC 6 Clicks Basic Mobility (PT)  AM-PAC 6 Clicks Score (PT): 8  AM-PAC 6 Clicks Score (OT): 9    Bina Chun PTA  1/10/2023

## 2023-01-10 NOTE — PLAN OF CARE
Goal Outcome Evaluation:  Plan of Care Reviewed With: patient        Progress: improving  Outcome Evaluation: SMALL BOWEL FOLLOW THROUGH DONE TODAY. NG TUBE D/C'D. RESTRAINTS REMOVED. PATIENT OK'D FOR FULL LIQUIDS, BUT D/T COUGHING WITH LIQUIDS- SLP WAS CONSULTED AND PLACED PATIENT ON NECTAR THICK LIQUIDS. REMAINS ON VAPOTHERM. NEPHRO CONSULTED TODAY, FLUIDS ADJUSTED. MULTIPLE LOOSE BMs TODAY. TURN Q2H, BOTTOM RED/BLANCHABLE, CREAM APPLIED. CONT TO MONITOR.

## 2023-01-10 NOTE — PLAN OF CARE
"Goal Outcome Evaluation:  Plan of Care Reviewed With: patient           Outcome Evaluation: Pt lying in bed w/ no complaints. Pt kept saying \"no, no\". When asked what was wrong pt wouldn't say anything other than \"no\". Performed AAROM-PROM 2x15 w/ rest breaks as needed. Pt followed less than 50% of 1 step commands. Exercises were mostly passive. Practice rolling in bed w/ Min-Mod A. Pt assisted but still neded cues for technique and completion of task. Scoot pt up in Max A in [facility]. Attempted to set up pts breakfast tray but she kept pushing the table away. Tried to give pt a bite of yoggurt and she spit it out. Let w/ exit alram on. RN notified.  "

## 2023-01-10 NOTE — THERAPY TREATMENT NOTE
Acute Care - Speech Language Pathology   Swallow Treatment Note [facility]     Patient Name: Susana Delcid  : 1940  MRN: [id number]  Today's Date: 1/10/2023               Admit Date: 2023  Diet order has been changed to thin full liquids by physician. Pt completed trials of thin water. Pt was impulsive taking large consecutive sips. SLP had to take straw away as pt wouldn't follow verbal commands to take smaller sips. She inconsistently displayed coughing and wet vocal quality after thin trials, but typically did clear her vocal quality. She took one nectar thick milk trial via straw, but exhibited a cough, wet vocal quality, and then spit out the rest. She refused anything else, but thin water. NurseKerrie reports that the pt took an entire cup of nectar thick milk earlier today via spoon with no s/s of aspiration.     Will change pt back to nectar thick liquids. Try presenting via spoon. Ok for ice chips or small sips of water between meals, but staff will have to control amount pt takes at a time. Pt unlikely to meet nutritional needs via PO due to limited intake of anything other than water.     Pt appears to have some level of pharyngeal dysphagia. Typically SLP would recommend further assessment via instrumental methods such as modified barium swallow or flexible endoscopic evaluation of the swallow. Feel this pt would be inappropriate for either as she would likely spit out barium for MBS and would be unlikely to allow SLP to pass scope due to confusion and agitation.     For this reason, if the pt continues to exhibit s/s of aspiration or increased lung congestion despite diet modifications, PEG may need to be considered if aggressive measures are still desired. SLP will continue to follow and treat as appropriate.   Rolando Mayes, CCC-SLP 1/10/2023 16:07 CST    Visit Dx:     ICD-10-CM ICD-9-CM   1. Small bowel obstruction (HCC)  K56.609 560.9   2. Decreased activities of daily living (ADL)   Z78.9 V49.89   3. Impaired mobility  Z74.09 799.89   4. Dysphagia, unspecified type  R13.10 787.20     Patient Active Problem List   Diagnosis   • Acute systolic congestive heart failure (HCC)   • Elevated troponin level   • Iron deficiency anemia   • Tachycardia   • Elevated d-dimer   • UTI (urinary tract infection)   • Essential hypertension   • Mixed hyperlipidemia   • Pleural effusion, bilateral   • SBO (small bowel obstruction) (HCC)   • LETITIA (acute kidney injury) (HCC)   • Stage 3b chronic kidney disease (CKD) (HCC)   • Left lower lobe pneumonia   • Hyperkalemia   • Chronic combined systolic (congestive) and diastolic (congestive) heart failure (HCC)   • Acute respiratory failure with hypoxia (HCC)   • Unspecified bacterial pneumonia   • Acute on chronic combined systolic and diastolic CHF (congestive heart failure) (HCC)     Past Medical History:   Diagnosis Date   • Hyperlipidemia    • Hypertension      Past Surgical History:   Procedure Laterality Date   • APPENDECTOMY     • HYSTERECTOMY     • TONSILLECTOMY     • WRIST SURGERY Right        SLP Recommendation and Plan                                                                            Plan of Care Reviewed With: patient  Outcome Evaluation: See note      SWALLOW EVALUATION (last 72 hours)     SLP Adult Swallow Evaluation     Row Name 01/09/23 1521                   Rehab Evaluation    Document Type evaluation  -MB        Subjective Information complains of  wanting water  -MB        Patient Observations alert;poorly cooperative  -MB        Patient/Family/Caregiver Comments/Observations No family present, [place] of the state  -MB           General Information    Patient Profile Reviewed yes  -MB        Pertinent History Of Current Problem Vomiting, bowel obstruction, dementia, LLL pneumonia, LETITIA, hyperkalemia, HTN, bandemia, [place], CXR: continued consolidation of medial L lung base, pneumonia vs atelectasis  -MB        Current Method of Nutrition thin  liquids;full liquids  -MB        Precautions/Limitations, Vision WFL;for purposes of eval  -MB        Precautions/Limitations, Hearing hearing impairment, bilaterally  -MB        Prior Level of Function-Communication cognitive-linguistic impairment;other (see comments)  dementia  -MB        Prior Level of Function-Swallowing unknown  -MB        Plans/Goals Discussed with patient  -MB        Barriers to Rehab cognitive status;hearing deficit  -MB        Patient's Goals for Discharge --  Pt wants water  -MB           Pain    Additional Documentation Pain Scale: FACES Pre/Post-Treatment (Group)  -MB           Pain Scale: FACES Pre/Post-Treatment    Pain: FACES Scale, Pretreatment 0-->no hurt  -MB           Oral Motor Structure and Function    Secretion Management WNL/WFL  -MB        Mucosal Quality moist, healthy  -MB           Oral Musculature and Cranial Nerve Assessment    Oral Motor General Assessment unable to assess  -MB           General Eating/Swallowing Observations    Respiratory Support Currently in Use high-flow nasal cannula  -MB        Eating/Swallowing Skills fed by SLP  -MB        Positioning During Eating upright in bed  -MB        Utensils Used spoon;straw  -MB        Consistencies Trialed thin liquids;nectar/syrup-thick liquids;honey-thick liquids;pudding thick  -MB           Clinical Swallow Eval    Oral Prep Phase impaired  -MB        Oral Transit WFL  -MB        Oral Residue WFL  -MB        Pharyngeal Phase suspected pharyngeal impairment  -MB        Esophageal Phase unremarkable  -MB        Clinical Swallow Evaluation Summary See note  -MB           Oral Prep Concerns    Oral Prep Concerns spits out food prior to swallow  -MB        Spits Out Food Prior to Swallow nectar;honey;pudding  -MB           Pharyngeal Phase Concerns    Pharyngeal Phase Concerns wet vocal quality;cough  -MB        Wet Vocal Quality thin  -MB        Cough thin  -MB           SLP Evaluation Clinical Impression    SLP  Swallowing Diagnosis mild-moderate;oral dysphagia;mod-severe;suspected pharyngeal dysphagia  -MB        Functional Impact risk of aspiration/pneumonia;risk of malnutrition;risk of dehydration  -MB        Rehab Potential/Prognosis, Swallowing re-evaluate goals as necessary  -MB        Swallow Criteria for Skilled Therapeutic Interventions Met demonstrates skilled criteria  -MB           Recommendations    Therapy Frequency (Swallow) 2 days per week  -MB        Predicted Duration Therapy Intervention (Days) until discharge  -MB        SLP Diet Recommendation nectar thick liquids;full liquid diet;ice chips between meals after oral care, with supervision  -MB        Recommended Diagnostics reassess via clinical swallow evaluation  -MB        Recommended Precautions and Strategies upright posture during/after eating;small bites of food and sips of liquid;general aspiration precautions  -MB        Oral Care Recommendations Oral Care BID/PRN  -MB        SLP Rec. for Method of Medication Administration meds crushed;with thick liquids;with puree  -MB        Monitor for Signs of Aspiration yes;cough;gurgly voice;throat clearing;notify SLP if any concerns  -MB        Anticipated Discharge Disposition (SLP) skilled nursing facility  -MB           Swallow Goals (SLP)    Swallow LTGs Swallow Long Term Goal (free text)  -MB        Swallow STGs diet tolerance goal selection (SLP)  -MB        Diet Tolerance Goal Selection (SLP) Patient will tolerate trials of  -MB           (LTG) Swallow    (LTG) Swallow Pt will tolerate least restrictive diet with no overt s/s of aspiration.  -MB        [place] (Swallow Long Term Goal) with moderate cues (50-74% accuracy)  -MB        Time Frame (Swallow Long Term Goal) by discharge  -MB        Barriers (Swallow Long Term Goal) n/a  -MB        Progress/Outcomes (Swallow Long Term Goal) new goal  -MB        Comment (Swallow Long Term Goal) n/a  -MB           (STG) Patient will tolerate trials of     Consistencies Trialed (Tolerate trials) nectar/ mildly thick liquids  -MB        Desired Outcome (Tolerate trials) without signs/symptoms of aspiration;with adequate oral prep/transit/clearance  -MB        [place] (Tolerate trials) with moderate cues (50-74% accuracy)  -MB        Time Frame (Tolerate trials) by discharge  -MB        Progress/Outcomes (Tolerate trials) new goal  -MB        Comment (Tolerate trials) n/a  -MB              User Key  (r) = Recorded By, (t) = Taken By, (c) = Cosigned By    Initials Name Effective Dates    Rolando Vo CCC-SLP 06/16/21 -                 EDUCATION  The patient has been educated in the following areas:   Dysphagia (Swallowing Impairment).        SLP GOALS     Row Name 01/09/23 1521             (LTG) Swallow    (LTG) Swallow Pt will tolerate least restrictive diet with no overt s/s of aspiration.  -MB      [place] (Swallow Long Term Goal) with moderate cues (50-74% accuracy)  -MB      Time Frame (Swallow Long Term Goal) by discharge  -MB      Barriers (Swallow Long Term Goal) n/a  -MB      Progress/Outcomes (Swallow Long Term Goal) new goal  -MB      Comment (Swallow Long Term Goal) n/a  -MB         (STG) Patient will tolerate trials of    Consistencies Trialed (Tolerate trials) nectar/ mildly thick liquids  -MB      Desired Outcome (Tolerate trials) without signs/symptoms of aspiration;with adequate oral prep/transit/clearance  -MB      [place] (Tolerate trials) with moderate cues (50-74% accuracy)  -MB      Time Frame (Tolerate trials) by discharge  -MB      Progress/Outcomes (Tolerate trials) new goal  -MB      Comment (Tolerate trials) n/a  -MB            User Key  (r) = Recorded By, (t) = Taken By, (c) = Cosigned By    Initials Name Provider Type    Rolando Vo CCC-SLP Speech and Language Pathologist                   Time Calculation:    Time Calculation- SLP     Row Name 01/10/23 8376             Time Calculation- SLP    SLP Start  Time 1505  -MB      SLP Stop Time 1607  -MB      SLP Time Calculation (min) 62 min  -MB      SLP Received On 01/10/23  -MB         Untimed Charges    76480-UP Treatment Swallow Minutes 62  -MB         Total Minutes    Untimed Charges Total Minutes 62  -MB       Total Minutes 62  -MB            User Key  (r) = Recorded By, (t) = Taken By, (c) = Cosigned By    Initials Name Provider Type    Rolando Vo CCC-SLP Speech and Language Pathologist                Therapy Charges for Today     Code Description Service Date Service Provider Modifiers Qty    00687441810 HC ST EVAL ORAL PHARYNG SWALLOW 4 1/9/2023 Rolando Mayes CCC-SLP GN 1    [id number] HC ST TREATMENT SWALLOW 4 1/10/2023 Rolando Mayes CCC-SLP GN 1               LORETTA Suresh  1/10/2023

## 2023-01-10 NOTE — PLAN OF CARE
Goal Outcome Evaluation:  Plan of Care Reviewed With: patient        Progress: no change  Outcome Evaluation: Bed alarm on, safety maintained. Pulls off oxygen and pulls at wires, pushes food away, but did drink some thickened milk. Renal US done, see results. D5 infusing. Continue to monitor.

## 2023-01-10 NOTE — PROGRESS NOTES
[facility]  INPATIENT PROGRESS NOTE    Patient Name: Susana Delcid  Date of Admission: 1/5/2023  Today's Date: 01/10/23  Length of Stay: 5  Primary Care Physician: Provider, No Known    Subjective   Chief Complaint: Bowel obstruction       Patient examined lying in bed, had recently had her oxygen removed.  Her respiratory status is greatly improved today after her diuretics.  I asked her if she needed anything or was hurting.  She told me she would like some chocolate milk, this is a change from the red top milk she usually requests.  I informed her we would now be starting a diet that speech therapy recommended was safe for her and her small bowel obstruction was resolved.    ROS:  Unable to do obtain due to dementia and altered mental status.  Otherwise as stated above.  All pertinent negatives and positives are as above. All other systems have been reviewed and are negative unless otherwise stated.     Objective    Temp:  [97.5 °F (36.4 °C)-98.6 °F (37 °C)] 97.5 °F (36.4 °C)  Heart Rate:  [59-98] 77  Resp:  [16-20] 16  BP: (101-128)/(46-51) 128/49  Physical Exam  Vitals and nursing note reviewed.   Constitutional:       Comments: Cachectic.  Chronically ill.  Advanced age.   HENT:      Head: Normocephalic and atraumatic.   Eyes:      Conjunctiva/sclera: Conjunctivae normal.      Pupils: Pupils are equal, round, and reactive to light.   Neck:      Vascular: No JVD.   Cardiovascular:      Rate and Rhythm: Normal rate and regular rhythm.      Heart sounds: Normal heart sounds.   Pulmonary:      Effort: No respiratory distress.      Breath sounds: No wheezing or rales.      Comments: Diminished breath sound bilateral, clear, on room air  Chest:      Chest wall: No tenderness.   Abdominal:      General: Bowel sounds are normal. There is no distension.      Palpations: Abdomen is soft.      Tenderness: There is no abdominal tenderness.   Musculoskeletal:          General: No tenderness or deformity.      Cervical back: Neck supple.   Skin:     General: Skin is warm and dry.      Capillary Refill: Capillary refill takes 2 to 3 seconds.      Findings: No rash.   Neurological:      Cranial Nerves: No cranial nerve deficit.      Motor: Weakness present. No abnormal muscle tone.      Coordination: Coordination abnormal.      Gait: Gait abnormal.      Deep Tendon Reflexes: Reflexes normal.           Results Review:  I have reviewed the labs, radiology results, and diagnostic studies.    Laboratory Data:   Results from last 7 days   Lab Units 01/10/23  0457 01/09/23  0525 01/08/23  0323   WBC 10*3/mm3 11.95* 13.83* 14.36*   HEMOGLOBIN g/dL 11.8* 13.3 13.1   HEMATOCRIT % 40.8 44.8 43.7   PLATELETS 10*3/mm3 310 298 332        Results from last 7 days   Lab Units 01/10/23  0456 01/09/23  2346 01/09/23  1743 01/08/23  1228 01/08/23  0323 01/07/23  0444   SODIUM mmol/L 153* 154* 158*   < > 160* 152*   POTASSIUM mmol/L 3.7 3.8 3.2*   < > 3.1* 4.0   CHLORIDE mmol/L 101 99 103   < > 105 105   CO2 mmol/L 46.0* 47.0* 48.0*   < > 44.0* 34.0*   BUN mg/dL 60* 53* 49*   < > 64* 91*   CREATININE mg/dL 2.49* 2.24* 1.75*   < > 1.87* 2.87*   CALCIUM mg/dL 9.5 9.8 9.7   < > 9.2 9.1   BILIRUBIN mg/dL 0.2  --   --   --  0.2 0.3   ALK PHOS U/L 85  --   --   --  68 67   ALT (SGPT) U/L 20  --   --   --  12 15   AST (SGOT) U/L 28  --   --   --  14 17   GLUCOSE mg/dL 175* 213* 131*   < > 154* 121*    < > = values in this interval not displayed.       Culture Data:   Blood Culture   Date Value Ref Range Status   01/05/2023 No growth at 2 days  Preliminary   01/05/2023 No growth at 2 days  Preliminary       Radiology Data:   Imaging Results (Last 24 Hours)     Procedure Component Value Units Date/Time    US Renal Bilateral [[id number]] Resulted: 01/10/23 1041     Updated: 01/10/23 1105    XR Chest 1 View [[id number]] Collected: 01/09/23 1429     Updated: 01/09/23 1434    Narrative:      EXAMINATION: XR CHEST 1  VW- 1/9/2023 2:29 PM CST     HISTORY: Worsening pulmonary status; K56.609-Unspecified intestinal  obstruction, unspecified as to partial versus complete obstruction;  Z78.9-Other specified health status; Z74.09-Other reduced mobility     REPORT: A frontal view the chest was obtained.     COMPARISON: Chest x-ray 01/05/2023.     The right lung is clear, there is partial consolidation of the left base  with most of the left hemidiaphragm is obscured. There is also mild  elevation of left hemidiaphragm. The side-port and tip of the NG tube  appears to be at the gastric fundus level, satisfactory. No pneumothorax  is identified. A small left pleural effusion is not excluded. Contrast  is noted within the upper colon. Diffuse osteopenia is present. There  appear to be multiple chronic insufficiency compression deformities in  the thoracic spine.       Impression:      Continued consolidation of the medial left lung base with  obscuration of most of the left hemidiaphragm, which may represent  pneumonia or atelectasis. There also appears to be a small left pleural  effusion. The newly inserted NG tube appears to be in satisfactory  position.  This report was finalized on 01/09/2023 14:31 by Dr. Gilmer Santizo MD.    XR Abdomen KUB [[id number]] Collected: 01/09/23 0931     Updated: 01/09/23 1350    Narrative:      EXAMINATION: XR ABDOMEN KUB- 1/9/2023 9:31 AM CST     HISTORY: NG tube placement; K56.609-Unspecified intestinal obstruction,  unspecified as to partial versus complete obstruction.     REPORT: A supine view of the upper abdomen was obtained.     COMPARISON: KUB 01/09/2023 0338 hours.     The tip and side-port of the NG tube project over the gastric fundus in  satisfactory position. This is stable. There is moderate volume loss in  the left lung base. The visualized bowel gas pattern appears  nonobstructive. Moderately heavy calcified plaque is noted within the  abdominal aorta and iliac arteries.        Impression:      Stable satisfactory position of the NG tube with the tip and  side-port in the gastric fundus.  This report was finalized on 01/09/2023 09:33 by Dr. Gilmer Santizo MD.    FL Small Bowel Follow Through Single-Contrast [[id number]] Collected: 01/09/23 1238     Updated: 01/09/23 1244    Narrative:      EXAMINATION: FL SMALL BOWEL FOLLOW THROUGH SINGLE-CONTRAST- 1/9/2023  12:38 PM CST     HISTORY: Patient with small bowel obstruction please evaluate with a  small bowel follow-through can  complete through the NG tube;  K56.609-Unspecified intestinal obstruction, unspecified as to partial  versus complete obstruction.     REPORT: Small bowel follow-through was performed with administration of  contrast through the existing NG tube.     COMPARISON: KUB on same day.     After contrast was administered through the NG tube, images were  obtained at 15 minutes, 30 minutes and 2 hours. At 15 minutes, the  stomach is mostly opacified and there is contrast within the upper  abdominal loops of jejunum, which are mildly distended, measuring up to  4.2 cm. No jejunal mucosal thickening is identified. There is a small  duodenal diverticulum. At 30 minutes, most of the contrast is seen in  the jejunum and proximal ileum, without mass effect. At 2 hours,  contrast is seen throughout the small intestine and in the colon to the  level of the rectum. There is residual contrast in the stomach as well.       Impression:      Contrast reaches the rectum by 2 hours and there is no  evidence of small bowel obstruction.  This report was finalized on 01/09/2023 12:41 by Dr. Gilmer Santizo MD.          I have reviewed the patient's current medications.     Assessment/Plan   Assessment  Active Hospital Problems    Diagnosis    • **SBO (small bowel obstruction) ([facility])    • Unspecified bacterial pneumonia    • Acute on chronic combined systolic and diastolic CHF (congestive heart failure) ([facility])    • Acute respiratory failure with  hypoxia (HCC)    • LETITIA (acute kidney injury) (HCC)    • Stage 3b chronic kidney disease (CKD) (HCC)    • Hyperkalemia    • Essential hypertension        Treatment Plan  Patient is a 82-year-old female who presents to [facility] emergency department from [facility].  Patient has history of dementia, hyperlipidemia, hypertension.  This list is not at all exclusive as full history is limited at this time with patient being a poor historian. She presents with chief complaint of nausea and vomiting, the patient has abdominal distention, NG tube placed in emergency department.  CT scan abdomen shows small bowel obstruction.  Patient initially tachycardic and hypoxic requiring Vapotherm nasal cannula supplementation.  Creatinine on presentation 3.4, creatinine of 1.35 and September 2021.  GFR on presentation was 13, GFR in September 2021 was 38.     Small bowel obstruction-   NG tube initially placed in stomach and hooked to low intermittent suction.  Patient pulled NG tube out.  Plan to reinstate NG tube.  Dr. Gabriel with general surgery following.    Small bowel follow-through conducted today 1/9/2023.  Bowel obstruction resolved.  General surgery recommended advancing diet with discontinuation of NG tube.  SLP evaluated.  Currently on full liquid diet with thin liquids.     Acute kidney injury superimposed on stage 3b chronic kidney disease-  Creatinine 3.4, BUN 83, GFR 13.1 on arrival.  These values are worsened from baseline of stage IIIb chronic kidney disease. Creatinine 1.35 and GFR 38 in September 2021.  Nephrology consulted.  Diuretic initiated yesterday and discontinued this morning due to slight increase in creatinine.  Diuresis was successful and allowed for improved respiratory status.  Renal ultrasound pending.  D5 IV infusion at 100 mL/h.     Hypoxia-  Respiratory status worsened and required Vapotherm 60% on 1/9/2023.  Chest x-ray obtained.  Decision to diurese.  Diuresis  successful and patient currently on room air.    Hypernatremia-  Sodium improved to 153 today.  Continue D5 IV infusion at 100 mL/h.     Left lower lobe pneumonia-  WBC 13.83, afebrile.   Azithromycin and Rocephin IV.  Blood cultures x2 no growth at 3 days.  COVID-negative.  Repeat chest x-ray 1/9/2023.  Patient currently on room air with O2 saturation 94%.     Chronic combined systolic and diastolic heart failure/hypertension-  Echocardiogram from January 2018 shows EF 31 to 35% with diastolic dysfunction.  We will be mindful of this with IV hydration.  We will hold home Lasix for now.   Patient diuresed on 1/9/2023 for worsened respiratory status.  Diuretic discontinued the morning of 1/10/2023 for increased creatinine.  Diuresis was successful.  Patient on room air.  Home Coreg restarted 1/10/2023 as patient is now cleared for p.o. intake.  Metoprolol IV discontinued.     Hypokalemia  Potassium 3.7.  Nephrology following.    Palliative care consulted.     Patient [place] a [place] the state and a full code.     Medical Decision Making  Number and Complexity of problems: 3 high complexity problems in acute kidney injury and small bowel obstruction and hypoxia.  3 moderate complexity problems in pneumonia and chronic combined systolic and diastolic heart failure and hypokalemia and hypernatremia  Differential Diagnosis: None     Conditions and Status  Small bowel obstruction resolved  Acute kidney injury improving  Left lower lobe pneumonia stable  Chronic combined systolic and diastolic heart failure requiring more oxygen  Hypokalemia resolved.  Hypernatremia improving.  Hypoxia worsening     MDM Data  External documents reviewed: Echocardiogram from 2018  Cardiac tracing (EKG, telemetry) interpretation: Telemetry shows trigeminy  Radiology interpretation: Reviewed radiologist interpretation of chest x-ray/KUB  Labs reviewed: Arterial blood gas, CMP, CBC with differential, urinalysis  Any tests that were considered but  not ordered: Echocardiogram     Decision rules/scores evaluated (example [id number], [place], etc): None     Discussed with: Patient, although I do not believe she understands.     Care Planning  Shared decision making: Discussed plan of care with patient in the presence of cognitive disability.  Code status and discussions: Full code, patient is a [place] the [place].     Disposition  Social Determinants of Health that impact treatment or disposition: Patient resides at a skilled nursing facility  2 to 6 days.    Electronically signed by JAMIR Worthy, 01/10/23, 11:48 CST.

## 2023-01-11 LAB
ALBUMIN SERPL-MCNC: 2.7 G/DL (ref 3.5–5.2)
ALBUMIN/GLOB SERPL: 0.9 G/DL
ALP SERPL-CCNC: 94 U/L (ref 39–117)
ALT SERPL W P-5'-P-CCNC: 39 U/L (ref 1–33)
ANION GAP SERPL CALCULATED.3IONS-SCNC: 11 MMOL/L (ref 5–15)
ANION GAP SERPL CALCULATED.3IONS-SCNC: 12 MMOL/L (ref 5–15)
ANION GAP SERPL CALCULATED.3IONS-SCNC: 15 MMOL/L (ref 5–15)
AST SERPL-CCNC: 40 U/L (ref 1–32)
BASOPHILS # BLD AUTO: 0.05 10*3/MM3 (ref 0–0.2)
BASOPHILS NFR BLD AUTO: 0.4 % (ref 0–1.5)
BILIRUB SERPL-MCNC: 0.3 MG/DL (ref 0–1.2)
BUN SERPL-MCNC: 69 MG/DL (ref 8–23)
BUN SERPL-MCNC: 73 MG/DL (ref 8–23)
BUN SERPL-MCNC: 73 MG/DL (ref 8–23)
BUN/CREAT SERPL: 16.3 (ref 7–25)
BUN/CREAT SERPL: 17.7 (ref 7–25)
BUN/CREAT SERPL: 18.1 (ref 7–25)
CALCIUM SPEC-SCNC: 8 MG/DL (ref 8.6–10.5)
CALCIUM SPEC-SCNC: 8.6 MG/DL (ref 8.6–10.5)
CALCIUM SPEC-SCNC: 8.7 MG/DL (ref 8.6–10.5)
CHLORIDE SERPL-SCNC: 92 MMOL/L (ref 98–107)
CHLORIDE SERPL-SCNC: 93 MMOL/L (ref 98–107)
CHLORIDE SERPL-SCNC: 96 MMOL/L (ref 98–107)
CO2 SERPL-SCNC: 37 MMOL/L (ref 22–29)
CO2 SERPL-SCNC: 38 MMOL/L (ref 22–29)
CO2 SERPL-SCNC: 38 MMOL/L (ref 22–29)
CREAT SERPL-MCNC: 3.89 MG/DL (ref 0.57–1)
CREAT SERPL-MCNC: 4.03 MG/DL (ref 0.57–1)
CREAT SERPL-MCNC: 4.49 MG/DL (ref 0.57–1)
DEPRECATED RDW RBC AUTO: 49.5 FL (ref 37–54)
DEPRECATED RDW RBC AUTO: 52.9 FL (ref 37–54)
EGFRCR SERPLBLD CKD-EPI 2021: 10.6 ML/MIN/1.73
EGFRCR SERPLBLD CKD-EPI 2021: 11.1 ML/MIN/1.73
EGFRCR SERPLBLD CKD-EPI 2021: 9.4 ML/MIN/1.73
EOSINOPHIL # BLD AUTO: 0.18 10*3/MM3 (ref 0–0.4)
EOSINOPHIL NFR BLD AUTO: 1.5 % (ref 0.3–6.2)
ERYTHROCYTE [DISTWIDTH] IN BLOOD BY AUTOMATED COUNT: 14.8 % (ref 12.3–15.4)
ERYTHROCYTE [DISTWIDTH] IN BLOOD BY AUTOMATED COUNT: 15.2 % (ref 12.3–15.4)
GLOBULIN UR ELPH-MCNC: 3.1 GM/DL
GLUCOSE SERPL-MCNC: 103 MG/DL (ref 65–99)
GLUCOSE SERPL-MCNC: 104 MG/DL (ref 65–99)
GLUCOSE SERPL-MCNC: 104 MG/DL (ref 65–99)
HCT VFR BLD AUTO: 39.1 % (ref 34–46.6)
HCT VFR BLD AUTO: 43.1 % (ref 34–46.6)
HGB BLD-MCNC: 11.6 G/DL (ref 12–15.9)
HGB BLD-MCNC: 12.3 G/DL (ref 12–15.9)
IMM GRANULOCYTES # BLD AUTO: 0.32 10*3/MM3 (ref 0–0.05)
IMM GRANULOCYTES NFR BLD AUTO: 2.6 % (ref 0–0.5)
LYMPHOCYTES # BLD AUTO: 2.07 10*3/MM3 (ref 0.7–3.1)
LYMPHOCYTES NFR BLD AUTO: 17 % (ref 19.6–45.3)
MCH RBC QN AUTO: 26.8 PG (ref 26.6–33)
MCH RBC QN AUTO: 26.9 PG (ref 26.6–33)
MCHC RBC AUTO-ENTMCNC: 28.5 G/DL (ref 31.5–35.7)
MCHC RBC AUTO-ENTMCNC: 29.7 G/DL (ref 31.5–35.7)
MCV RBC AUTO: 90.7 FL (ref 79–97)
MCV RBC AUTO: 93.9 FL (ref 79–97)
MONOCYTES # BLD AUTO: 1.22 10*3/MM3 (ref 0.1–0.9)
MONOCYTES NFR BLD AUTO: 10 % (ref 5–12)
NEUTROPHILS NFR BLD AUTO: 68.5 % (ref 42.7–76)
NEUTROPHILS NFR BLD AUTO: 8.37 10*3/MM3 (ref 1.7–7)
NRBC BLD AUTO-RTO: 0 /100 WBC (ref 0–0.2)
PLATELET # BLD AUTO: 225 10*3/MM3 (ref 140–450)
PLATELET # BLD AUTO: 247 10*3/MM3 (ref 140–450)
PMV BLD AUTO: 11.8 FL (ref 6–12)
PMV BLD AUTO: 11.9 FL (ref 6–12)
POTASSIUM SERPL-SCNC: 3.8 MMOL/L (ref 3.5–5.2)
POTASSIUM SERPL-SCNC: 4 MMOL/L (ref 3.5–5.2)
POTASSIUM SERPL-SCNC: 4 MMOL/L (ref 3.5–5.2)
PROT SERPL-MCNC: 5.8 G/DL (ref 6–8.5)
RBC # BLD AUTO: 4.31 10*6/MM3 (ref 3.77–5.28)
RBC # BLD AUTO: 4.59 10*6/MM3 (ref 3.77–5.28)
SODIUM SERPL-SCNC: 141 MMOL/L (ref 136–145)
SODIUM SERPL-SCNC: 142 MMOL/L (ref 136–145)
SODIUM SERPL-SCNC: 149 MMOL/L (ref 136–145)
WBC NRBC COR # BLD: 10.38 10*3/MM3 (ref 3.4–10.8)
WBC NRBC COR # BLD: 12.21 10*3/MM3 (ref 3.4–10.8)

## 2023-01-11 PROCEDURE — 85025 COMPLETE CBC W/AUTO DIFF WBC: CPT

## 2023-01-11 PROCEDURE — 94799 UNLISTED PULMONARY SVC/PX: CPT

## 2023-01-11 PROCEDURE — 25010000002 AZITHROMYCIN PER 500 MG: Performed by: INTERNAL MEDICINE

## 2023-01-11 PROCEDURE — 99232 SBSQ HOSP IP/OBS MODERATE 35: CPT

## 2023-01-11 PROCEDURE — 25010000002 CEFTRIAXONE PER 250 MG: Performed by: INTERNAL MEDICINE

## 2023-01-11 PROCEDURE — 99231 SBSQ HOSP IP/OBS SF/LOW 25: CPT | Performed by: SPECIALIST

## 2023-01-11 PROCEDURE — 80053 COMPREHEN METABOLIC PANEL: CPT | Performed by: INTERNAL MEDICINE

## 2023-01-11 PROCEDURE — 85027 COMPLETE CBC AUTOMATED: CPT

## 2023-01-11 RX ADMIN — IPRATROPIUM BROMIDE 0.5 MG: 0.5 SOLUTION RESPIRATORY (INHALATION) at 10:14

## 2023-01-11 RX ADMIN — DEXTROSE MONOHYDRATE 50 ML/HR: 50 INJECTION, SOLUTION INTRAVENOUS at 12:47

## 2023-01-11 RX ADMIN — ALBUTEROL SULFATE 1.25 MG: 2.5 SOLUTION RESPIRATORY (INHALATION) at 22:29

## 2023-01-11 RX ADMIN — IPRATROPIUM BROMIDE 0.5 MG: 0.5 SOLUTION RESPIRATORY (INHALATION) at 06:07

## 2023-01-11 RX ADMIN — IPRATROPIUM BROMIDE 0.5 MG: 0.5 SOLUTION RESPIRATORY (INHALATION) at 22:29

## 2023-01-11 RX ADMIN — ALBUTEROL SULFATE 1.25 MG: 2.5 SOLUTION RESPIRATORY (INHALATION) at 06:07

## 2023-01-11 RX ADMIN — ALBUTEROL SULFATE 1.25 MG: 2.5 SOLUTION RESPIRATORY (INHALATION) at 14:13

## 2023-01-11 RX ADMIN — SODIUM CHLORIDE 500 ML: 9 INJECTION, SOLUTION INTRAVENOUS at 17:06

## 2023-01-11 RX ADMIN — CARVEDILOL 12.5 MG: 6.25 TABLET, FILM COATED ORAL at 10:18

## 2023-01-11 RX ADMIN — AZITHROMYCIN MONOHYDRATE 500 MG: 500 INJECTION, POWDER, LYOPHILIZED, FOR SOLUTION INTRAVENOUS at 23:08

## 2023-01-11 RX ADMIN — CARVEDILOL 12.5 MG: 6.25 TABLET, FILM COATED ORAL at 17:06

## 2023-01-11 RX ADMIN — AZITHROMYCIN MONOHYDRATE 500 MG: 500 INJECTION, POWDER, LYOPHILIZED, FOR SOLUTION INTRAVENOUS at 00:26

## 2023-01-11 RX ADMIN — ALBUTEROL SULFATE 1.25 MG: 2.5 SOLUTION RESPIRATORY (INHALATION) at 10:14

## 2023-01-11 RX ADMIN — CEFTRIAXONE 1 G: 1 INJECTION, POWDER, FOR SOLUTION INTRAMUSCULAR; INTRAVENOUS at 20:54

## 2023-01-11 RX ADMIN — IPRATROPIUM BROMIDE 0.5 MG: 0.5 SOLUTION RESPIRATORY (INHALATION) at 14:13

## 2023-01-11 NOTE — PROGRESS NOTES
Nephrology ([facility]) Progress Note      Patient:  Susana Delcid  YOB: 1940  Date of Service: 1/11/2023  MRN: [id number]   Acct: [id number]   Primary Care Physician: Provider, No Known  Advance Directive:   Code Status and Medical Interventions:   Ordered at: 01/05/23 0315     Level Of Support Discussed With:    Patient     Code Status (Patient has no pulse and is not breathing):    CPR (Attempt to Resuscitate)     Medical Interventions (Patient has pulse or is breathing):    Full Support     Admit Date: 1/5/2023       Hospital Day: 6  Referring Provider: Dony Samuels, DO      Patient personally seen and examined.  Complete chart including Consults, Notes, Operative Reports, Labs, Cardiology, and Radiology studies reviewed as able.        Subjective:  Susana Delcid is a 82 y.o. female for whom we were consulted for evaluation and treatment of acute kidney injury, hypernatremia, hypokalemia.  History of dementia.  Resides at [facility].  Brought to ER with nausea and vomiting on 1/05.  Imaging revealed small bowel obstruction and pneumonia. Creatinine was 3.4. Hospital course remarkable for some improvement of renal function and for some fluctuation of sodium and potassium levels. Sodium improving with change to hypotonic IV fluids.    Today is awake, remains confused per her baseline. Nursing reports decline in number of voids and subjective decline in quantity of each void.    Allergies:  Aspirin, Codeine, and Motrin [ibuprofen]    Home Meds:  Medications Prior to Admission   Medication Sig Dispense Refill Last Dose   • acetaminophen (TYLENOL) 325 MG tablet Take 650 mg by mouth Every 6 (Six) Hours As Needed for Mild Pain .   Past Week   • ARIPiprazole (ABILIFY) 5 MG tablet Take 5 mg by mouth Daily.   1/5/2023   • carvedilol (COREG) 12.5 MG tablet Take 12.5 mg by mouth 2 (Two) Times a Day With Meals.   1/5/2023   • cholecalciferol (VITAMIN D3) 1.25 MG (92653 UT)  capsule Take 50,000 Units by mouth 1 (One) Time Per Week. One time a day every Tue for supplement   Past Week   • cloNIDine (CATAPRES) 0.1 MG tablet Take 0.1 mg by mouth Daily As Needed for High Blood Pressure. 1 tablet every 24 hours as needed for high BP for SBP greater than 180, DBP greater than 90   Past Month   • furosemide (LASIX) 40 MG tablet Take 40 mg by mouth Daily.   1/5/2023   • hydrALAZINE (APRESOLINE) 50 MG tablet Take 1 tablet by mouth Every 8 (Eight) Hours.   1/5/2023   • isosorbide dinitrate (ISORDIL) 20 MG tablet Take 1 tablet by mouth Every 8 (Eight) Hours.   1/5/2023   • potassium chloride (MICRO-K) 10 MEQ CR capsule Take 4 capsules by mouth Daily.   1/5/2023   • PARoxetine (PAXIL) 10 MG tablet Take 10 mg by mouth Every Morning.   Unknown       Medicines:  Current Facility-Administered Medications   Medication Dose Route Frequency Provider Last Rate Last Admin   • acetaminophen (TYLENOL) tablet 650 mg  650 mg Oral Q4H PRN Dony Samuels DO       • albuterol (PROVENTIL) nebulizer solution 0.5% 2.5 mg/0.5mL  1.25 mg Nebulization 4x Daily - RT Dony Samuels DO   1.25 mg at 01/11/23 1014    And   • ipratropium (ATROVENT) nebulizer solution 0.5 mg  0.5 mg Nebulization 4x Daily - RT Dony Samuels DO   0.5 mg at 01/11/23 1014   • azithromycin (ZITHROMAX) 500 mg in sodium chloride 0.9 % 250 mL IVPB-VTB  500 mg Intravenous Q24H Dony Samuels DO   500 mg at 01/11/23 0026   • carvedilol (COREG) tablet 12.5 mg  12.5 mg Oral BID With Meals Seamus Ruvalcaba APRN   12.5 mg at 01/11/23 1018   • cefTRIAXone (ROCEPHIN) 1 g in sodium chloride 0.9 % 100 mL IVPB-VTB  1 g Intravenous Q24H Dony Samuels  mL/hr at 01/10/23 2149 1 g at 01/10/23 2149   • dextrose (D5W) 5 % infusion  100 mL/hr Intravenous Continuous Flavio Jaquez APRN 100 mL/hr at 01/10/23 1154 100 mL/hr at 01/10/23 1154   • ondansetron (ZOFRAN) injection 4 mg  4 mg Intravenous Q6H PRN Dony Samuels DO       •  silver sulfadiazine (SILVADENE, SSD) 1 % cream   Topical Q12H PRN Dony Samuels DO       • sodium chloride 0.9 % flush 10 mL  10 mL Intravenous Q12H Dony Samuels DO   10 mL at 01/10/23 2150   • sodium chloride 0.9 % flush 10 mL  10 mL Intravenous PRN Dony Samuels DO       • sodium chloride 0.9 % infusion 40 mL  40 mL Intravenous PRN Dony Samuels DO       • sodium chloride nasal spray 2 spray  2 spray Each Nare PRN Claudio Cobb MD   2 spray at 01/08/23 1634       Past Medical History:  Past Medical History:   Diagnosis Date   • Hyperlipidemia    • Hypertension        Past Surgical History:  Past Surgical History:   Procedure Laterality Date   • APPENDECTOMY     • HYSTERECTOMY     • TONSILLECTOMY     • WRIST SURGERY Right        Family History  History reviewed. No pertinent family history.    Social History  Social History     Socioeconomic History   • Marital status:    Tobacco Use   • Smoking status: Never   • Smokeless tobacco: Never   Substance and Sexual Activity   • Alcohol use: No   • Drug use: No   • Sexual activity: Defer       Review of Systems:  History obtained from chart review and the patient  General ROS: No fever or chills  Respiratory ROS: No cough, shortness of breath, wheezing  Cardiovascular ROS: No chest pain or palpitations  Gastrointestinal ROS: No abdominal pain or melena  Genito-Urinary ROS: No dysuria or hematuria  Psych ROS: No anxiety and depression  14 point ROS reviewed with the patient and negative except as noted above and in the HPI unless unable to obtain.    Objective:  Patient Vitals for the past 24 hrs:   BP Temp Temp src Pulse Resp SpO2 Weight   01/11/23 1021 -- -- -- 78 16 90 % --   01/11/23 1015 -- -- -- 75 18 91 % --   01/11/23 0843 139/75 97.2 °F (36.2 °C) Axillary 90 18 90 % --   01/11/23 0613 -- -- -- 83 18 92 % --   01/11/23 0607 -- -- -- 82 16 91 % --   01/11/23 0335 119/62 97.8 °F (36.6 °C) Oral 91 18 92 % 53.2 kg (117 lb 4.8 oz)    01/10/23 2337 -- -- -- -- -- 91 % --   01/10/23 2326 95/57 98 °F (36.7 °C) Oral 63 16 90 % --   01/10/23 2216 -- -- -- -- -- 96 % --   01/10/23 2213 -- -- -- -- -- (!) 89 % --   01/10/23 2102 -- -- -- 89 -- 94 % --   01/10/23 2058 -- -- -- 74 16 90 % --   01/10/23 2012 (!) 89/51 98.3 °F (36.8 °C) Oral 92 16 92 % --   01/10/23 1500 -- 97.9 °F (36.6 °C) Oral 92 16 92 % --   01/10/23 1406 -- -- -- 103 16 91 % --   01/10/23 1400 -- -- -- 100 16 97 % --   01/10/23 1150 152/68 97.5 °F (36.4 °C) Oral 76 16 98 % --       Intake/Output Summary (Last 24 hours) at 1/11/2023 1033  Last data filed at 1/11/2023 0700  Gross per 24 hour   Intake 60 ml   Output 0 ml   Net 60 ml     General: awake, oriented X 1  Chest:  clear to auscultation bilaterally without respiratory distress  CVS: regular rate and rhythm  Abdominal: soft, nontender, positive bowel sounds  Extremities: no cyanosis or edema  Skin: warm and dry without rash      Labs:  Results from last 7 days   Lab Units 01/11/23  0504 01/10/23  0457 01/09/23  0525   WBC 10*3/mm3 12.21* 11.95* 13.83*   HEMOGLOBIN g/dL 12.3 11.8* 13.3   HEMATOCRIT % 43.1 40.8 44.8   PLATELETS 10*3/mm3 225 310 298         Results from last 7 days   Lab Units 01/11/23  0504 01/10/23  1850 01/10/23  1232 01/10/23  0456 01/08/23  1228 01/08/23  0323   SODIUM mmol/L 149* 151* 155* 153*   < > 160*   POTASSIUM mmol/L 3.8 4.1 3.7 3.7   < > 3.1*   CHLORIDE mmol/L 96* 98 100 101   < > 105   CO2 mmol/L 38.0* 43.0* 45.0* 46.0*   < > 44.0*   BUN mg/dL 69* 67* 66* 60*   < > 64*   CREATININE mg/dL 3.89* 3.41* 2.93* 2.49*   < > 1.87*   CALCIUM mg/dL 8.7 9.2 9.2 9.5   < > 9.2   EGFR mL/min/1.73 11.1* 13.0* 15.6* 19.0*   < > 26.8*   BILIRUBIN mg/dL 0.3  --   --  0.2  --  0.2   ALK PHOS U/L 94  --   --  85  --  68   ALT (SGPT) U/L 39*  --   --  20  --  12   AST (SGOT) U/L 40*  --   --  28  --  14   GLUCOSE mg/dL 103* 135* 144* 175*   < > 154*    < > = values in this interval not displayed.       Radiology:    Imaging Results (Last 72 Hours)     Procedure Component Value Units Date/Time    US Renal Bilateral [[id number]] Collected: 01/10/23 1430     Updated: 01/10/23 1436    Narrative:      HISTORY: Acute kidney injury     Renal ultrasound: Sonographic imaging of the kidneys and bladder is  performed.     COMPARISON: CT abdomen pelvis on 01/05/2023     FINDINGS: The kidneys appear isoechoic compared to the liver. Right  kidney measures 8.7 x 5.0 x 4.8 cm. There is a 1.7 cm inferior right  renal cyst with few internal echoes supporting a proteinaceous cyst. The  left kidney measures 9.6 x 5.2 x 4.9 cm. There is a 2.6 cm superior  right renal cyst with a 3.7 cm mid lateral left renal cyst. No  convincing solid renal mass. No hydronephrosis. No obstructing  intrarenal stones. No abnormal perinephric fluid collection.     Bladder appears normal. Visible abdominal aorta is normal in caliber.  Proximal IVC is patent.       Impression:      1. Simple appearing left renal cysts with a 1.7 cm  proteinaceous/hemorrhagic right renal cyst. No convincing solid renal  mass. No hydronephrosis.  This report was finalized on 01/10/2023 14:33 by Dr. Mindy Park MD.    XR Chest 1 View [[id number]] Collected: 01/09/23 1429     Updated: 01/09/23 1434    Narrative:      EXAMINATION: XR CHEST 1 VW- 1/9/2023 2:29 PM CST     HISTORY: Worsening pulmonary status; K56.609-Unspecified intestinal  obstruction, unspecified as to partial versus complete obstruction;  Z78.9-Other specified health status; Z74.09-Other reduced mobility     REPORT: A frontal view the chest was obtained.     COMPARISON: Chest x-ray 01/05/2023.     The right lung is clear, there is partial consolidation of the left base  with most of the left hemidiaphragm is obscured. There is also mild  elevation of left hemidiaphragm. The side-port and tip of the NG tube  appears to be at the gastric fundus level, satisfactory. No pneumothorax  is identified. A small left pleural  effusion is not excluded. Contrast  is noted within the upper colon. Diffuse osteopenia is present. There  appear to be multiple chronic insufficiency compression deformities in  the thoracic spine.       Impression:      Continued consolidation of the medial left lung base with  obscuration of most of the left hemidiaphragm, which may represent  pneumonia or atelectasis. There also appears to be a small left pleural  effusion. The newly inserted NG tube appears to be in satisfactory  position.  This report was finalized on 01/09/2023 14:31 by Dr. Gilmer Santizo MD.    XR Abdomen KUB [[id number]] Collected: 01/09/23 0931     Updated: 01/09/23 1350    Narrative:      EXAMINATION: XR ABDOMEN KUB- 1/9/2023 9:31 AM CST     HISTORY: NG tube placement; K56.609-Unspecified intestinal obstruction,  unspecified as to partial versus complete obstruction.     REPORT: A supine view of the upper abdomen was obtained.     COMPARISON: KUB 01/09/2023 0338 hours.     The tip and side-port of the NG tube project over the gastric fundus in  satisfactory position. This is stable. There is moderate volume loss in  the left lung base. The visualized bowel gas pattern appears  nonobstructive. Moderately heavy calcified plaque is noted within the  abdominal aorta and iliac arteries.       Impression:      Stable satisfactory position of the NG tube with the tip and  side-port in the gastric fundus.  This report was finalized on 01/09/2023 09:33 by Dr. Gilmer Santizo MD.    FL Small Bowel Follow Through Single-Contrast [[id number]] Collected: 01/09/23 1238     Updated: 01/09/23 1244    Narrative:      EXAMINATION: FL SMALL BOWEL FOLLOW THROUGH SINGLE-CONTRAST- 1/9/2023  12:38 PM CST     HISTORY: Patient with small bowel obstruction please evaluate with a  small bowel follow-through can  complete through the NG tube;  K56.609-Unspecified intestinal obstruction, unspecified as to partial  versus complete obstruction.     REPORT: Small bowel  follow-through was performed with administration of  contrast through the existing NG tube.     COMPARISON: KUB on same day.     After contrast was administered through the NG tube, images were  obtained at 15 minutes, 30 minutes and 2 hours. At 15 minutes, the  stomach is mostly opacified and there is contrast within the upper  abdominal loops of jejunum, which are mildly distended, measuring up to  4.2 cm. No jejunal mucosal thickening is identified. There is a small  duodenal diverticulum. At 30 minutes, most of the contrast is seen in  the jejunum and proximal ileum, without mass effect. At 2 hours,  contrast is seen throughout the small intestine and in the colon to the  level of the rectum. There is residual contrast in the stomach as well.       Impression:      Contrast reaches the rectum by 2 hours and there is no  evidence of small bowel obstruction.  This report was finalized on 01/09/2023 12:41 by Dr. Gilmer Santizo MD.    XR Abdomen KUB [[id number]] Collected: 01/09/23 0606     Updated: 01/09/23 0612    Narrative:      EXAMINATION: XR ABDOMEN KUB-     1/9/2023 3:30 AM CST     HISTORY: new NG tube placement verification; K56.609-Unspecified  intestinal obstruction, unspecified as to partial versus complete  obstruction     A frontal projection of the upper abdomen including the lower chest is  compared with the previous study dated 01/08/2023     There is interval minimal advancement of the gastric tube. The distal  sidehole now appears to be distal to the gastroesophageal junction. The  distal end of the tube is in the proximal stomach.     There is a left lower lung consolidation with complete obliteration left  diaphragm.     The limited visualized abdomen show moderate gas and stool in the colon.  No evidence of dilatation or large bowel loops.     Both kidneys are obscured by the bowel contents. Small calcification is  seen projected over the right midabdomen medially. These may represent  calcified  lymph nodes, granulomatous or calculi?. These are similar to  the previous study.     Severe atheromatous changes thoracic and abdominal aorta are noted.     No acute bony abnormality.       Impression:      1. Distal end of the nasogastric tube is in the proximal stomach. The  distal sidehole is adjacent and below the gastroesophageal junction.  This report was finalized on 01/09/2023 06:09 by Dr. Lisa Meng MD.    XR Abdomen KUB [[id number]] Collected: 01/08/23 1543     Updated: 01/08/23 1547    Narrative:      XR ABDOMEN KUB- 1/8/2023 3:38 PM CST     HISTORY: PLACEMENT OF NEW NG TUBE; K56.609-Unspecified intestinal  obstruction, unspecified as to partial versus complete obstruction     FINDINGS:  Feeding tube is seen with the tip located in the upper  gastric body. Catheter sidehole remains at the gastroesophageal  junction.     No gross intraperitoneal free air. Atelectasis in the left lung base.  Right lung base is clear.          Impression:      Feeding tube tip located in the upper gastric body. Catheter  sidehole remains at the gastroesophageal junction. Consider additional  slight advancement.     This report was finalized on 01/08/2023 15:44 by Dr Joe Perez, .          Culture:  Blood Culture   Date Value Ref Range Status   01/05/2023 No growth at 4 days  Preliminary   01/05/2023 No growth at 4 days  Preliminary         Assessment   1.  Acute kidney injury, worse today  2.  Hypernatremia--improving  3.  Hypokalemia--improved  4.  Small bowel obstruction  5.  LLL pneumonia  6.  Combined systolic/diastolic CHF    Plan:  1.  Continue IV fluids, decrease rate to avoid volume overload  2.  Sodium level improving but unfortunately renal function has steadily worsened for several days. Urine output now also declining. Dialysis is indicated but patient is an exceedingly poor candidate for any form of RRT due to her advanced dementia. Unsure if she would be able to tolerate sitting in dialysis chair for a  full treatment or if she would tolerate having permcath in place without pulling at it? Would need to clarify with patient's guardian what they want done.    Flavio Jaquez, APRN  1/11/2023  10:33 CST

## 2023-01-11 NOTE — PROGRESS NOTES
LOS: 6 days   Patient Care Team:  Provider, No Known as PCP - General    Chief Complaint: Small bowel obstruction  Subjective     Subjective     Hospital day #6, patient is tolerating her full liquids, no particular problems she is resting well, NG tube is been out for 2 days, she is taking liquids, having several bowel movements today, no particular problems no pain  Objective      Objective     Vital Signs  Temp:  [97.5 °F (36.4 °C)-98.3 °F (36.8 °C)] 97.8 °F (36.6 °C)  Heart Rate:  [] 83  Resp:  [16-18] 18  BP: ()/(51-68) 119/62    Intake & Output (last 3 days)       01/08 0701 01/09 0700 01/09 0701  01/10 0700 01/10 0701  01/11 0700 01/11 0701 01/12 0700    P.O.  712 180     Total Intake(mL/kg)  712 (14.4) 180 (3.4)     Urine (mL/kg/hr) 1350 (1.1) 30 (0) 0 (0)     Emesis/NG output 1550 550      Stool  0 0     Total Output 2900 580 0     Net -2900 +132 +180             Urine Unmeasured Occurrence 1 x 3 x 1 x     Stool Unmeasured Occurrence  4 x 1 x           Physical Exam:     General Appearance:    Alert, cooperative, in no acute distress   Lungs:     Clear to auscultation,respirations regular, even and                  unlabored    Heart:    Regular rhythm and normal rate, normal S1 and S2, no            murmur, no gallop, no rub, no click   Chest Wall:    No abnormalities observed   Abdomen:    Soft, flat, nontender, normal bowel sounds, no masses, nontender, white count is 12, electrolytes are returning to normal, sodium of 149, potassium increased to 3.8.        Results Review:     I reviewed the patient's new clinical results.  I reviewed the patient's new imaging results and agree with the interpretation.    Results from last 7 days   Lab Units 01/11/23  0504 01/10/23  0457 01/09/23  0525   WBC 10*3/mm3 12.21* 11.95* 13.83*   HEMOGLOBIN g/dL 12.3 11.8* 13.3   HEMATOCRIT % 43.1 40.8 44.8   PLATELETS 10*3/mm3 225 310 298        Results from last 7 days   Lab Units 01/11/23  6550  01/10/23  1850 01/10/23  1232 01/10/23  0456 01/08/23  1228 01/08/23  0323   SODIUM mmol/L 149* 151* 155* 153*   < > 160*   POTASSIUM mmol/L 3.8 4.1 3.7 3.7   < > 3.1*   CHLORIDE mmol/L 96* 98 100 101   < > 105   CO2 mmol/L 38.0* 43.0* 45.0* 46.0*   < > 44.0*   BUN mg/dL 69* 67* 66* 60*   < > 64*   CREATININE mg/dL 3.89* 3.41* 2.93* 2.49*   < > 1.87*   CALCIUM mg/dL 8.7 9.2 9.2 9.5   < > 9.2   BILIRUBIN mg/dL 0.3  --   --  0.2  --  0.2   ALK PHOS U/L 94  --   --  85  --  68   ALT (SGPT) U/L 39*  --   --  20  --  12   AST (SGOT) U/L 40*  --   --  28  --  14   GLUCOSE mg/dL 103* 135* 144* 175*   < > 154*    < > = values in this interval not displayed.       Assessment/Plan     Assessment & Plan       SBO (small bowel obstruction) (HCC)    Essential hypertension    LETITIA (acute kidney injury) (HCC)    Stage 3b chronic kidney disease (CKD) (HCC)    Hyperkalemia    Acute respiratory failure with hypoxia (HCC)    Unspecified bacterial pneumonia    Acute on chronic combined systolic and diastolic CHF (congestive heart failure) (HCC)      Patient with the above problems noted, she is slowly improving, no evidence of any small bowel obstruction, okay with me for discharge anytime, follow-up with me as needed.      Tam Gabriel MD  01/11/23  07:57 CST      Time: time spent with patient 15 minutes     Part of this note may be an electronic transcription/translation of spoken language to printed text using the Dragon Dictation System.

## 2023-01-11 NOTE — PROGRESS NOTES
"    [facility] Palliative Care Services  Progress Note  Patient Name: Susana Delcid  Date of Admission: 1/5/2023  Today's Date: 01/11/23     Code Status and Medical Interventions:   Ordered at: 01/05/23 1735     Level Of Support Discussed With:    Patient     Code Status (Patient has no pulse and is not breathing):    CPR (Attempt to Resuscitate)     Medical Interventions (Patient has pulse or is breathing):    Full Support     Subjective   Chief complaint/Reason for Referral/Visit: Follow up on Goals of Care/Advance Care Planning.    Medical record reviewed. Events noted.  Labs collected this morning reveal further decline in renal function with creatinine rising back to 3.89, BUN 69 and GFR 11.1.  Sodium remains elevated however improving and is now 149.  ALT and AST also elevated again and albumin remains low at 2.7.  According to chart review she has had decreased urine output over the last 24 hours.  Nephrology notes reviewed which revealed dialysis is indicated however noted to be exceedingly poor candidate of any form of RRT due to advanced dementia.  She is lying in bed at time of exam alert and in no apparent distress.  When asked how she was feeling she stated \"I am fine I do not want anything.\"  Explained was trying to check in on her and determine if she had any questions and/or concerns.  She stated again again I do not want anything.\"  No visitors at bedside.    Advance Care Planning   Advanced Directives: Guardianship paper on file naming Margarito Steel as her guardian.     Call placed to patient's guardian, Margarito Steel, to discuss current condition and possibility of dialysis.  Unable to reach at this time.  Voicemail left with request for return phone call.    Patient's guardian, Margarito Steel, return phone call shortly after initial phone call placed.  Updated him on patient condition and explored whether he had additional details regarding past medical history including " "anything that would affect decision making resulting in guardian.  He shared information for medical records on file and stated in 2018 she had a psychiatry report completed stating she had \"periods of confusion related to cognitive deficiency syndrome.\"  Reports she was then deemed unable to make decisions for herself and was appointed guardian.  Margarito also shared she has significant history including COPD and CHF.  He shared also shared there had previously been concern for cognitive brain injury related to hypoxia.  We discussed current condition and explained potential need for hemodialysis soon due to decline in renal function.  Explored whether she had ever discussed this previously.  He states \"Ms. Delcid never voluntarily or electively shared wishes regarding medical decisions.\"  Explained concerns with introducing hemodialysis given cognitive status and how it might affect her quality of life.  Margarito states \"I am obligated to continue unless the doctor says it would not benefit her wellbeing.\"  Concerns acknowledged.  Explained concerns with how she might tolerate hemodialysis.  He again restated he is obligated \"until the doctor says it would not benefit her wellbeing.\"        Review of Systems   Unable to perform ROS: other (Patient is extremely hard of hearing and uncooperative at this time.)     Objective   Diagnostics: Reviewed      Intake/Output Summary (Last 24 hours) at 1/11/2023 1216  Last data filed at 1/11/2023 0700  Gross per 24 hour   Intake 60 ml   Output 0 ml   Net 60 ml     Current Facility-Administered Medications   Medication Dose Route Frequency Provider Last Rate Last Admin   • acetaminophen (TYLENOL) tablet 650 mg  650 mg Oral Q4H PRN Dony Samuels, DO       • albuterol (PROVENTIL) nebulizer solution 0.5% 2.5 mg/0.5mL  1.25 mg Nebulization 4x Daily - RT Dony Samuels, DO   1.25 mg at 01/11/23 1014    And   • ipratropium (ATROVENT) nebulizer solution 0.5 mg  0.5 mg " "Nebulization 4x Daily - RT Dony Samuels DO   0.5 mg at 01/11/23 1014   • azithromycin (ZITHROMAX) 500 mg in sodium chloride 0.9 % 250 mL IVPB-VTB  500 mg Intravenous Q24H Dony Samuels DO   500 mg at 01/11/23 0026   • carvedilol (COREG) tablet 12.5 mg  12.5 mg Oral BID With Meals Seamus Ruvalcaba APRN   12.5 mg at 01/11/23 1018   • cefTRIAXone (ROCEPHIN) 1 g in sodium chloride 0.9 % 100 mL IVPB-VTB  1 g Intravenous Q24H Dony Samuels  mL/hr at 01/10/23 2149 1 g at 01/10/23 2149   • dextrose (D5W) 5 % infusion  50 mL/hr Intravenous Continuous Flavio Jaquez APRN 50 mL/hr at 01/11/23 1042 50 mL/hr at 01/11/23 1042   • ondansetron (ZOFRAN) injection 4 mg  4 mg Intravenous Q6H PRN Dony Samuels DO       • silver sulfadiazine (SILVADENE, SSD) 1 % cream   Topical Q12H PRN Dony Samuels DO       • sodium chloride 0.9 % flush 10 mL  10 mL Intravenous Q12H Dony Samuels DO   10 mL at 01/10/23 2150   • sodium chloride 0.9 % flush 10 mL  10 mL Intravenous PRN Dony Samuels DO       • sodium chloride 0.9 % infusion 40 mL  40 mL Intravenous PRN Dony Samuels DO       • sodium chloride nasal spray 2 spray  2 spray Each Nare PRN Claudio Cobb MD   2 spray at 01/08/23 1634     dextrose, 50 mL/hr, Last Rate: 50 mL/hr (01/11/23 1042)      •  acetaminophen  •  ondansetron  •  silver sulfadiazine  •  sodium chloride  •  sodium chloride  •  sodium chloride    Assessment:  Vital Signs: /75 (BP Location: Right arm, Patient Position: Lying)   Pulse 78   Temp 97.2 °F (36.2 °C) (Axillary)   Resp 16   Ht 175.3 cm (69\")   Wt 53.2 kg (117 lb 4.8 oz)   SpO2 90%   BMI 17.32 kg/m²     Physical Exam  Vitals and nursing note reviewed.   Constitutional:       General: She is not in acute distress.     Appearance: She is underweight. She is ill-appearing.   HENT:      Head: Normocephalic and atraumatic.      Right Ear: Decreased hearing noted.      Left Ear: Decreased hearing " noted.   Eyes:      General: Lids are normal.   Neck:      Vascular: No JVD.      Trachea: Trachea normal.   Cardiovascular:      Rate and Rhythm: Normal rate.      Heart sounds: Normal heart sounds.   Pulmonary:      Effort: Pulmonary effort is normal.   Chest:      Chest wall: No deformity or swelling.   Abdominal:      General: There is no distension.      Palpations: Abdomen is soft.   Musculoskeletal:      Cervical back: Neck supple.   Skin:     General: Skin is warm and dry.   Neurological:      Mental Status: She is alert. She is disoriented.   Psychiatric:         Behavior: Behavior is uncooperative.         Cognition and Memory: Cognition is impaired.     Functional status: Palliative Performance Scale Score: Performance 30% based on the following measures: Ambulation: Totally bed bound, Activity and Evidence of Disease: Unable to do any work, extensive evidence of disease, Self-Care: Total care required,  Intake: Reduced, LOC: Full, drowsy or confusion.  Nutritional status: Albumin 2.7. Body mass index is 17.32 kg/m².   Patient status: Disease state: Deteriorating despite treatments.    Impression/Problem List:  1. Chronic cerebral microvascular disease - Per CT of head 2018  2. Cognitive deficiency syndrome - Per guardian   3. Small bowel obstruction - Resolved  4. Acute kidney injury  5. Hypernatremia  6. Acute respiratory failure with hypoxia  7. Underweight (BMI 16.11)  8. Pneumonia of LLL   9. Pleural effusion, left  10. Leukocytosis  11. Essential hypertension  12. Advanced age  13. Chronic combined systolic and diastolic CHF - Per echocardiogram 2018   14. Impaired hearing         Plan / Recommendations     1. Palliative Care Encounter   - Goals of care include CODE STATUS CPR with full support interventions.    - Prognosis is guarded long-term secondary to LETITIA, hyponatremia, acute respiratory failure with hypoxia, underweight, pneumonia of LLL, pleural effusion, left, advanced age, SBO and other  "comorbidities listed above.    - Spoke with patient's guardian, Margarito Steel, via telephone regarding condition and medical priorities including hemodialysis.  Details of discussion above.    - Shared concerns with introducing hemodialysis given cognitive status and how it might affect her quality of life.  Margarito states \"I am obligated to continue unless the doctor says it would not benefit her wellbeing.\"      - Explained concerns with how she might tolerate hemodialysis.  He again restated he is obligated \"until the doctor says it would not benefit her wellbeing.\"      - Discussed with Dr. Gunn.    ADDENDUM:   - Call placed back to guardian, Margarito Steel, to discuss documentation regarding code status and medical priorities.  Reports he will send documentation to be filled out by physician however document would likely not be sent until in the morning. Updated physician. Will await for documentation to arrive.     Thank you for allowing us to participate in patient's plan of care. Palliative Care Team will continue to follow patient.     Time spent:35 minutes spent reviewing medical and medication records, assessing and examining patient, discussing with family, answering questions, providing some guidance about a plan and documentation of care, and coordinating care with other healthcare members, with > 50% time spent face to face.   Electronically signed by, JAMIR Beaver, 01/11/23.  "

## 2023-01-11 NOTE — PLAN OF CARE
Goal Outcome Evaluation:              Outcome Evaluation: NTN follow up. Pt refusing PO; supplements offered. Remains on FLD. Surgery signed off; SBO resolved. No plans for enteral TF at this time. RD available for recommendations as needed. NTN following per protocol.    MSA 1/9 - severe malnutrition - see note.

## 2023-01-11 NOTE — CASE MANAGEMENT/SOCIAL WORK
Continued Stay Note   [place]     Patient Name: Susana Delcid  MRN: [id number]  Today's Date: 1/11/2023    Admit Date: 1/5/2023    Plan: [facility]   Discharge Plan     Row Name 01/11/23 0903       Plan    Plan [facility]    Patient/Family in Agreement with Plan yes    Final Discharge Disposition Code 03 - skilled nursing facility (SNF)    Final Note Pt can dc to [facility] today or once medically stable.  [phone].  Pt will need covid swab.               Discharge Codes    No documentation.               Expected Discharge Date and Time     Expected Discharge Date Expected Discharge Time    Jan 12, 2023             ASHLEY MarcusW

## 2023-01-11 NOTE — PROGRESS NOTES
[facility] Medicine Services  INPATIENT PROGRESS NOTE    Patient Name: Susana Delcid  Date of Admission: 1/5/2023  Today's Date: 01/11/23  Length of Stay: 6  Primary Care Physician: Provider, No Known    Subjective   Chief Complaint: Bowel obstruction    Patient examined lying in bed on room air.  I was unable to get her to verbally respond to me when I asked her if she was doing okay or if she was in pain.  I suspect metabolic etiology related to this as her renal function has worsened.     ROS:  Unable to do obtain due to dementia and altered mental status.  Otherwise as stated above.  All pertinent negatives and positives are as above. All other systems have been reviewed and are negative unless otherwise stated.     Objective    Temp:  [97.2 °F (36.2 °C)-98.3 °F (36.8 °C)] 97.2 °F (36.2 °C)  Heart Rate:  [] 78  Resp:  [16-18] 16  BP: ()/(51-75) 139/75  Physical Exam  Vitals and nursing note reviewed.   Constitutional:       Comments: Cachectic.  Chronically ill.  Advanced age.   HENT:      Head: Normocephalic and atraumatic.   Eyes:      Conjunctiva/sclera: Conjunctivae normal.      Pupils: Pupils are equal, round, and reactive to light.   Neck:      Vascular: No JVD.   Cardiovascular:      Rate and Rhythm: Normal rate and regular rhythm.      Heart sounds: Normal heart sounds.   Pulmonary:      Effort: No respiratory distress.      Breath sounds: No wheezing or rales.      Comments: Diminished breath sound bilateral, clear, on room air  Chest:      Chest wall: No tenderness.   Abdominal:      General: Bowel sounds are normal. There is no distension.      Palpations: Abdomen is soft.      Tenderness: There is no abdominal tenderness.   Musculoskeletal:         General: No tenderness or deformity.      Cervical back: Neck supple.   Skin:     General: Skin is warm and dry.      Capillary Refill: Capillary refill takes 2 to 3 seconds.      Findings: No rash.    Neurological:      Cranial Nerves: No cranial nerve deficit.      Motor: Weakness present. No abnormal muscle tone.      Coordination: Coordination abnormal.      Gait: Gait abnormal.      Deep Tendon Reflexes: Reflexes normal.           Results Review:  I have reviewed the labs, radiology results, and diagnostic studies.    Laboratory Data:   Results from last 7 days   Lab Units 01/11/23  0504 01/10/23  0457 01/09/23  0525   WBC 10*3/mm3 12.21* 11.95* 13.83*   HEMOGLOBIN g/dL 12.3 11.8* 13.3   HEMATOCRIT % 43.1 40.8 44.8   PLATELETS 10*3/mm3 225 310 298        Results from last 7 days   Lab Units 01/11/23  0504 01/10/23  1850 01/10/23  1232 01/10/23  0456 01/08/23  1228 01/08/23  0323   SODIUM mmol/L 149* 151* 155* 153*   < > 160*   POTASSIUM mmol/L 3.8 4.1 3.7 3.7   < > 3.1*   CHLORIDE mmol/L 96* 98 100 101   < > 105   CO2 mmol/L 38.0* 43.0* 45.0* 46.0*   < > 44.0*   BUN mg/dL 69* 67* 66* 60*   < > 64*   CREATININE mg/dL 3.89* 3.41* 2.93* 2.49*   < > 1.87*   CALCIUM mg/dL 8.7 9.2 9.2 9.5   < > 9.2   BILIRUBIN mg/dL 0.3  --   --  0.2  --  0.2   ALK PHOS U/L 94  --   --  85  --  68   ALT (SGPT) U/L 39*  --   --  20  --  12   AST (SGOT) U/L 40*  --   --  28  --  14   GLUCOSE mg/dL 103* 135* 144* 175*   < > 154*    < > = values in this interval not displayed.       Culture Data:   Blood Culture   Date Value Ref Range Status   01/05/2023 No growth at 2 days  Preliminary   01/05/2023 No growth at 2 days  Preliminary       Radiology Data:   Imaging Results (Last 24 Hours)     Procedure Component Value Units Date/Time    US Renal Bilateral [[id number]] Collected: 01/10/23 1430     Updated: 01/10/23 1436    Narrative:      HISTORY: Acute kidney injury     Renal ultrasound: Sonographic imaging of the kidneys and bladder is  performed.     COMPARISON: CT abdomen pelvis on 01/05/2023     FINDINGS: The kidneys appear isoechoic compared to the liver. Right  kidney measures 8.7 x 5.0 x 4.8 cm. There is a 1.7 cm inferior  right  renal cyst with few internal echoes supporting a proteinaceous cyst. The  left kidney measures 9.6 x 5.2 x 4.9 cm. There is a 2.6 cm superior  right renal cyst with a 3.7 cm mid lateral left renal cyst. No  convincing solid renal mass. No hydronephrosis. No obstructing  intrarenal stones. No abnormal perinephric fluid collection.     Bladder appears normal. Visible abdominal aorta is normal in caliber.  Proximal IVC is patent.       Impression:      1. Simple appearing left renal cysts with a 1.7 cm  proteinaceous/hemorrhagic right renal cyst. No convincing solid renal  mass. No hydronephrosis.  This report was finalized on 01/10/2023 14:33 by Dr. Mindy Park MD.          I have reviewed the patient's current medications.     Assessment/Plan   Assessment  Active Hospital Problems    Diagnosis    • **SBO (small bowel obstruction) ([facility])    • Unspecified bacterial pneumonia    • Acute on chronic combined systolic and diastolic CHF (congestive heart failure) ([facility])    • Acute respiratory failure with hypoxia ([facility])    • LETITIA (acute kidney injury) ([facility])    • Stage 3b chronic kidney disease (CKD) ([facility])    • Hyperkalemia    • Essential hypertension        Treatment Plan  Patient is a 82-year-old female who presents to [facility] emergency department from [facility].  Patient has history of dementia, hyperlipidemia, hypertension.  This list is not at all exclusive as full history is limited at this time with patient being a poor historian. She presents with chief complaint of nausea and vomiting, the patient has abdominal distention, NG tube placed in emergency department.  CT scan abdomen shows small bowel obstruction.  Patient initially tachycardic and hypoxic requiring Vapotherm nasal cannula supplementation.  Creatinine on presentation 3.4, creatinine of 1.35 and September 2021.  GFR on presentation was 13, GFR in September 2021 was 38.     Small bowel obstruction-   NG tube initially  placed in stomach and hooked to low intermittent suction. Patient pulled NG tube out.  Plan to reinstate NG tube.  Dr. Gabriel with general surgery consulted.Small bowel follow-through conducted 1/9/2023.  Bowel obstruction resolved.  General surgery recommended advancing diet with discontinuation of NG tube.  SLP evaluated.  Currently on full liquid diet with thin liquids.  General surgery signing off unless otherwise needed.     Acute kidney injury superimposed on stage 3b chronic kidney disease-  Creatinine 3.4, BUN 83, GFR 13.1 on arrival.  These values are worsened from baseline of stage IIIb chronic kidney disease. Creatinine 1.35 and GFR 38 in September 2021.  Nephrology consulted.  Diuretic initiated 1/9/2023 in the presence of congestive  heart failure and worsening respiratory status and discontinued 1/10/2023 due to slight increase in creatinine.  Diuresis was successful and allowed for improved respiratory status.  Renal ultrasound obtained.  Creatinine 3.89, BUN 69, GFR 11.1.  Awaiting nephrology recommendation.  D5 IV infusion at 50 mL/h.     Hypoxia-  Respiratory status worsened and required Vapotherm 60% on 1/9/2023.  Chest x-ray obtained.  Decision to diurese.  Diuresis successful and patient currently on room air.    Hypernatremia-  Sodium improved to 149 today.  Continue D5 IV infusion at 50 mL/h.     Left lower lobe pneumonia-  WBC  12.2, afebrile.   Azithromycin and Rocephin IV.  Blood cultures x2 no growth at 5 days.  COVID-negative.  Repeat chest x-ray 1/9/2023.  Patient currently on room air with O2 saturation 91%.      Chronic combined systolic and diastolic heart failure/hypertension-  Echocardiogram from January 2018 shows EF 31 to 35% with diastolic dysfunction.  We will be mindful of this with IV hydration.  We will hold home Lasix for now.   Patient diuresed on 1/9/2023 for worsened respiratory status.  Diuretic discontinued the morning of 1/10/2023 for increased creatinine.  Diuresis  was successful.  Patient on room air.  IV metoprolol transitioned to home Coreg on 1/10/2023 as patient as patient was cleared by SLP for p.o. intake.     Hypokalemia  Potassium 3.8.  Nephrology following.    Palliative care consulted.     Patient is a marquez of the [facility] [employer] and a full code.     Medical Decision Making  Number and Complexity of problems: 3 high complexity problems in acute kidney injury and small bowel obstruction and hypoxia.  3 moderate complexity problems in pneumonia and chronic combined systolic and diastolic heart failure and hypokalemia and hypernatremia  Differential Diagnosis: None     Conditions and Status  Small bowel obstruction resolved  Acute kidney injury  worsening  Left lower lobe pneumonia stable  Chronic combined systolic and diastolic heart failure  stable  Hypokalemia resolved.  Hypernatremia improving.  Hypoxia worsening     MDM Data  External documents reviewed: Echocardiogram from 2018  Cardiac tracing (EKG, telemetry) interpretation: Telemetry shows trigeminy  Radiology interpretation: Reviewed radiologist interpretation of chest x-ray/KUB  Labs reviewed: Arterial blood gas, CMP, CBC with differential, urinalysis  Any tests that were considered but not ordered: Echocardiogram     Decision rules/scores evaluated (example [id number], [place], etc): None     Discussed with: Patient, although I do not believe she understands.     Care Planning  Shared decision making: Discussed plan of care with patient in the presence of cognitive disability.  Code status and discussions: Full code, patient is a marquez of the [facility].     Disposition  Social Determinants of Health that impact treatment or disposition: Patient resides at a skilled nursing facility  Discharge disposition unable to determine at this time due to worsening renal function.    Electronically signed by JAMIR Worthy, 01/11/23, 11:50 CST.

## 2023-01-12 LAB
ANION GAP SERPL CALCULATED.3IONS-SCNC: 10 MMOL/L (ref 5–15)
ANION GAP SERPL CALCULATED.3IONS-SCNC: 10 MMOL/L (ref 5–15)
ANION GAP SERPL CALCULATED.3IONS-SCNC: 12 MMOL/L (ref 5–15)
BASOPHILS # BLD AUTO: 0.02 10*3/MM3 (ref 0–0.2)
BASOPHILS NFR BLD AUTO: 0.2 % (ref 0–1.5)
BUN SERPL-MCNC: 74 MG/DL (ref 8–23)
BUN SERPL-MCNC: 74 MG/DL (ref 8–23)
BUN SERPL-MCNC: 78 MG/DL (ref 8–23)
BUN/CREAT SERPL: 17.1 (ref 7–25)
BUN/CREAT SERPL: 17.4 (ref 7–25)
BUN/CREAT SERPL: 18.2 (ref 7–25)
CALCIUM SPEC-SCNC: 8 MG/DL (ref 8.6–10.5)
CALCIUM SPEC-SCNC: 8.4 MG/DL (ref 8.6–10.5)
CALCIUM SPEC-SCNC: 8.5 MG/DL (ref 8.6–10.5)
CHLORIDE SERPL-SCNC: 90 MMOL/L (ref 98–107)
CHLORIDE SERPL-SCNC: 93 MMOL/L (ref 98–107)
CHLORIDE SERPL-SCNC: 93 MMOL/L (ref 98–107)
CO2 SERPL-SCNC: 35 MMOL/L (ref 22–29)
CO2 SERPL-SCNC: 37 MMOL/L (ref 22–29)
CO2 SERPL-SCNC: 37 MMOL/L (ref 22–29)
CREAT SERPL-MCNC: 4.25 MG/DL (ref 0.57–1)
CREAT SERPL-MCNC: 4.29 MG/DL (ref 0.57–1)
CREAT SERPL-MCNC: 4.32 MG/DL (ref 0.57–1)
CREAT UR-MCNC: 23.2 MG/DL
DEPRECATED RDW RBC AUTO: 47.4 FL (ref 37–54)
EGFRCR SERPLBLD CKD-EPI 2021: 10 ML/MIN/1.73
EGFRCR SERPLBLD CKD-EPI 2021: 9.8 ML/MIN/1.73
EGFRCR SERPLBLD CKD-EPI 2021: 9.9 ML/MIN/1.73
EOSINOPHIL # BLD AUTO: 0.12 10*3/MM3 (ref 0–0.4)
EOSINOPHIL NFR BLD AUTO: 1.1 % (ref 0.3–6.2)
ERYTHROCYTE [DISTWIDTH] IN BLOOD BY AUTOMATED COUNT: 14.6 % (ref 12.3–15.4)
GLUCOSE SERPL-MCNC: 111 MG/DL (ref 65–99)
GLUCOSE SERPL-MCNC: 189 MG/DL (ref 65–99)
GLUCOSE SERPL-MCNC: 223 MG/DL (ref 65–99)
HCT VFR BLD AUTO: 37.4 % (ref 34–46.6)
HGB BLD-MCNC: 11.8 G/DL (ref 12–15.9)
IMM GRANULOCYTES # BLD AUTO: 0.31 10*3/MM3 (ref 0–0.05)
IMM GRANULOCYTES NFR BLD AUTO: 3 % (ref 0–0.5)
LYMPHOCYTES # BLD AUTO: 1.6 10*3/MM3 (ref 0.7–3.1)
LYMPHOCYTES NFR BLD AUTO: 15.3 % (ref 19.6–45.3)
MCH RBC QN AUTO: 27.6 PG (ref 26.6–33)
MCHC RBC AUTO-ENTMCNC: 31.6 G/DL (ref 31.5–35.7)
MCV RBC AUTO: 87.6 FL (ref 79–97)
MONOCYTES # BLD AUTO: 0.72 10*3/MM3 (ref 0.1–0.9)
MONOCYTES NFR BLD AUTO: 6.9 % (ref 5–12)
NEUTROPHILS NFR BLD AUTO: 7.71 10*3/MM3 (ref 1.7–7)
NEUTROPHILS NFR BLD AUTO: 73.5 % (ref 42.7–76)
NRBC BLD AUTO-RTO: 0 /100 WBC (ref 0–0.2)
PLATELET # BLD AUTO: 250 10*3/MM3 (ref 140–450)
PMV BLD AUTO: 11.8 FL (ref 6–12)
POTASSIUM SERPL-SCNC: 3.6 MMOL/L (ref 3.5–5.2)
POTASSIUM SERPL-SCNC: 3.7 MMOL/L (ref 3.5–5.2)
POTASSIUM SERPL-SCNC: 3.8 MMOL/L (ref 3.5–5.2)
RBC # BLD AUTO: 4.27 10*6/MM3 (ref 3.77–5.28)
SODIUM SERPL-SCNC: 137 MMOL/L (ref 136–145)
SODIUM SERPL-SCNC: 140 MMOL/L (ref 136–145)
SODIUM SERPL-SCNC: 140 MMOL/L (ref 136–145)
SODIUM UR-SCNC: 92 MMOL/L
WBC NRBC COR # BLD: 10.48 10*3/MM3 (ref 3.4–10.8)

## 2023-01-12 PROCEDURE — 94799 UNLISTED PULMONARY SVC/PX: CPT

## 2023-01-12 PROCEDURE — 80048 BASIC METABOLIC PNL TOTAL CA: CPT | Performed by: FAMILY MEDICINE

## 2023-01-12 PROCEDURE — 85025 COMPLETE CBC W/AUTO DIFF WBC: CPT | Performed by: INTERNAL MEDICINE

## 2023-01-12 PROCEDURE — 84300 ASSAY OF URINE SODIUM: CPT | Performed by: INTERNAL MEDICINE

## 2023-01-12 PROCEDURE — 99232 SBSQ HOSP IP/OBS MODERATE 35: CPT

## 2023-01-12 PROCEDURE — 99231 SBSQ HOSP IP/OBS SF/LOW 25: CPT | Performed by: SPECIALIST

## 2023-01-12 PROCEDURE — 82570 ASSAY OF URINE CREATININE: CPT | Performed by: INTERNAL MEDICINE

## 2023-01-12 PROCEDURE — 92526 ORAL FUNCTION THERAPY: CPT

## 2023-01-12 PROCEDURE — 94761 N-INVAS EAR/PLS OXIMETRY MLT: CPT

## 2023-01-12 RX ADMIN — DEXTROSE MONOHYDRATE 75 ML/HR: 50 INJECTION, SOLUTION INTRAVENOUS at 11:29

## 2023-01-12 RX ADMIN — CARVEDILOL 12.5 MG: 6.25 TABLET, FILM COATED ORAL at 09:17

## 2023-01-12 RX ADMIN — ALBUTEROL SULFATE 1.25 MG: 2.5 SOLUTION RESPIRATORY (INHALATION) at 06:51

## 2023-01-12 RX ADMIN — IPRATROPIUM BROMIDE 0.5 MG: 0.5 SOLUTION RESPIRATORY (INHALATION) at 06:51

## 2023-01-12 RX ADMIN — IPRATROPIUM BROMIDE 0.5 MG: 0.5 SOLUTION RESPIRATORY (INHALATION) at 10:33

## 2023-01-12 RX ADMIN — ALBUTEROL SULFATE 1.25 MG: 2.5 SOLUTION RESPIRATORY (INHALATION) at 14:43

## 2023-01-12 RX ADMIN — ALBUTEROL SULFATE 1.25 MG: 2.5 SOLUTION RESPIRATORY (INHALATION) at 10:33

## 2023-01-12 RX ADMIN — Medication 10 ML: at 21:02

## 2023-01-12 RX ADMIN — IPRATROPIUM BROMIDE 0.5 MG: 0.5 SOLUTION RESPIRATORY (INHALATION) at 19:36

## 2023-01-12 RX ADMIN — ALBUTEROL SULFATE 1.25 MG: 2.5 SOLUTION RESPIRATORY (INHALATION) at 19:36

## 2023-01-12 RX ADMIN — IPRATROPIUM BROMIDE 0.5 MG: 0.5 SOLUTION RESPIRATORY (INHALATION) at 14:43

## 2023-01-12 RX ADMIN — CARVEDILOL 12.5 MG: 6.25 TABLET, FILM COATED ORAL at 17:33

## 2023-01-12 RX ADMIN — Medication 10 ML: at 09:17

## 2023-01-12 NOTE — THERAPY TREATMENT NOTE
Acute Care - Speech Language Pathology   Swallow Treatment Note [facility]     Patient Name: Susana Delcid  : 1940  MRN: [id number]  Today's Date: 2023               Admit Date: 2023     SLP dysphagia tx completed. Pt sleeping upon SLP arrival, required mod verbal cues to awaken. Pt exhibited significant difficulty understanding spoken communication given hearing impairment, yet stated she would be unable to see when SLP attempted to write as an alternative method of communication. Pt requested a strawberry milkshake, RN reported pt has consumed strawberry Boost more than most provided PO. SLP obtained Boost (observed to be a slightly thick vs thin consistency) from dietary staff, provided initial trials via spoon to control bolus size given concerns with Rolando Mayes MS, CCC-SLP at bedside during prior tx session. Pt c/o dislike for SLP controlling method of presentation, yet consumed trials via spoon with cues. She was observed to have reduced labial seal and resistance on spoon, yet no further concerns and appeared to tolerate, as no overt s/s of aspiration or additional concerns were observed. She continued to request to hold the cup and self feed, therefore, pt was allowed to consume trials via straw, which she consumed in serial form at times. Mild increase in work of breathing, 1x delayed cough, 1x vocal quality change. SLP then thickened Boost to a nectar thick consistency, which pt continued to consume via straw. She made verbal mention of realizing a difference in the consistency, required max cues to understand basic reasoning for SLP thickening the provided liquid. She was agreeable to regular solid trials, initially only agreeing to one trial, yet later requesting further trials. She self fed the regular solid trial with mildly to moderately reduced mandibular bite strength, reduced and slowed rotary mandibular movement with mastication, yet functional and tolerated without overt s/s  of aspiration or significant oral residue. Educated RN on observed concerns and plan. Cannot fully r/o aspiration at this time. RECS: Upgrade to mechanical soft diet consistency (as Dr. Cordova's note on 01/09/23 showed clearance for diet advancement), continue with nectar thick liquid consistency; Meds whole in pudding/applesauce and/or with nectar thick liquids; RN to monitor for increased lung congestion; Distant supervision from staff with PO intake. ST to continue to follow and tx for dysphagia. Thanks!   Fatmata Lino, CCC-SLP 1/12/2023 14:59 CST    Visit Dx:     ICD-10-CM ICD-9-CM   1. Small bowel obstruction (HCC)  K56.609 560.9   2. Decreased activities of daily living (ADL)  Z78.9 V49.89   3. Impaired mobility  Z74.09 799.89   4. Dysphagia, unspecified type  R13.10 787.20     Patient Active Problem List   Diagnosis   • Acute systolic congestive heart failure (HCC)   • Elevated troponin level   • Iron deficiency anemia   • Tachycardia   • Elevated d-dimer   • UTI (urinary tract infection)   • Essential hypertension   • Mixed hyperlipidemia   • Pleural effusion, bilateral   • SBO (small bowel obstruction) (HCC)   • LETITIA (acute kidney injury) (HCC)   • Stage 3b chronic kidney disease (CKD) (HCC)   • Left lower lobe pneumonia   • Hyperkalemia   • Chronic combined systolic (congestive) and diastolic (congestive) heart failure (HCC)   • Acute respiratory failure with hypoxia (HCC)   • Unspecified bacterial pneumonia   • Acute on chronic combined systolic and diastolic CHF (congestive heart failure) (HCC)     Past Medical History:   Diagnosis Date   • Hyperlipidemia    • Hypertension      Past Surgical History:   Procedure Laterality Date   • APPENDECTOMY     • HYSTERECTOMY     • TONSILLECTOMY     • WRIST SURGERY Right        SLP Recommendation and Plan                                                                            Plan of Care Reviewed With: patient, other (see comments) (RN, Joaquín)  Progress:  improving  Outcome Evaluation: SLP dysphagia tx completed. Pt sleeping upon SLP arrival, required mod verbal cues to awaken. Pt exhibited significant difficulty understanding spoken communication given hearing impairment, yet stated she would be unable to see when SLP attempted to write as an alternative method of communication.      SWALLOW EVALUATION (last 72 hours)     SLP Adult Swallow Evaluation     Row Name 01/12/23 1335 01/09/23 1523                Rehab Evaluation    Document Type therapy note (daily note)  -TM evaluation  -MB       Subjective Information --  Significantly hearing impaired  -TM complains of  wanting water  -MB       Patient Observations alert  Cooperative with encouragement and reinforcements  -TM alert;poorly cooperative  -MB       Patient/Family/Caregiver Comments/Observations No family present.  -TM No family present, [place]  -MB       Patient Effort fair  -TM --          General Information    Patient Profile Reviewed -- yes  -MB       Pertinent History Of Current Problem -- Vomiting, bowel obstruction, dementia, LLL pneumonia, LETITIA, hyperkalemia, HTN, bandemia, [place], CXR: continued consolidation of medial L lung base, pneumonia vs atelectasis  -MB       Current Method of Nutrition -- thin liquids;full liquids  -MB       Precautions/Limitations, Vision -- WFL;for purposes of eval  -MB       Precautions/Limitations, Hearing -- hearing impairment, bilaterally  -MB       Prior Level of Function-Communication -- cognitive-linguistic impairment;other (see comments)  dementia  -MB       Prior Level of Function-Swallowing -- unknown  -MB       Plans/Goals Discussed with -- patient  -MB       Barriers to Rehab -- cognitive status;hearing deficit  -MB       Patient's Goals for Discharge -- --  Pt wants water  -MB          Pain    Additional Documentation Pain Scale: FACES Pre/Post-Treatment (Group)  -TM Pain Scale: FACES Pre/Post-Treatment (Group)  -MB          Pain Scale: FACES  Pre/Post-Treatment    Pain: FACES Scale, Pretreatment 2-->hurts little bit  -TM 0-->no hurt  -MB       Posttreatment Pain Rating 2-->hurts little bit  -TM --          Oral Motor Structure and Function    Secretion Management -- WNL/WFL  -MB       Mucosal Quality -- moist, healthy  -MB          Oral Musculature and Cranial Nerve Assessment    Oral Motor General Assessment -- unable to assess  -MB          General Eating/Swallowing Observations    Respiratory Support Currently in Use -- high-flow nasal cannula  -MB       Eating/Swallowing Skills -- fed by SLP  -MB       Positioning During Eating -- upright in bed  -MB       Utensils Used -- spoon;straw  -MB       Consistencies Trialed -- thin liquids;nectar/syrup-thick liquids;honey-thick liquids;pudding thick  -MB          Clinical Swallow Eval    Oral Prep Phase -- impaired  -MB       Oral Transit -- WFL  -MB       Oral Residue -- WFL  -MB       Pharyngeal Phase -- suspected pharyngeal impairment  -MB       Esophageal Phase -- unremarkable  -MB       Clinical Swallow Evaluation Summary -- See note  -MB          Oral Prep Concerns    Oral Prep Concerns -- spits out food prior to swallow  -MB       Spits Out Food Prior to Swallow -- nectar;honey;pudding  -MB          Pharyngeal Phase Concerns    Pharyngeal Phase Concerns -- wet vocal quality;cough  -MB       Wet Vocal Quality -- thin  -MB       Cough -- thin  -MB          SLP Evaluation Clinical Impression    SLP Swallowing Diagnosis -- mild-moderate;oral dysphagia;mod-severe;suspected pharyngeal dysphagia  -MB       Functional Impact -- risk of aspiration/pneumonia;risk of malnutrition;risk of dehydration  -MB       Rehab Potential/Prognosis, Swallowing -- re-evaluate goals as necessary  -MB       Swallow Criteria for Skilled Therapeutic Interventions Met -- demonstrates skilled criteria  -MB          Recommendations    Therapy Frequency (Swallow) -- 2 days per week  -MB       Predicted Duration Therapy Intervention  (Days) -- until discharge  -MB       SLP Diet Recommendation -- nectar thick liquids;full liquid diet;ice chips between meals after oral care, with supervision  -MB       Recommended Diagnostics -- reassess via clinical swallow evaluation  -MB       Recommended Precautions and Strategies -- upright posture during/after eating;small bites of food and sips of liquid;general aspiration precautions  -MB       Oral Care Recommendations -- Oral Care BID/PRN  -MB       SLP Rec. for Method of Medication Administration -- meds crushed;with thick liquids;with puree  -MB       Monitor for Signs of Aspiration -- yes;cough;gurgly voice;throat clearing;notify SLP if any concerns  -MB       Anticipated Discharge Disposition (SLP) -- skilled nursing facility  -MB          Swallow Goals (SLP)    Swallow LTGs -- Swallow Long Term Goal (free text)  -MB       Swallow STGs -- diet tolerance goal selection (SLP)  -MB       Diet Tolerance Goal Selection (SLP) -- Patient will tolerate trials of  -MB          (LTG) Swallow    (LTG) Swallow Pt will tolerate least restrictive diet with no overt s/s of aspiration.  -TM Pt will tolerate least restrictive diet with no overt s/s of aspiration.  -MB       [place] (Swallow Long Term Goal) with moderate cues (50-74% accuracy)  -TM with moderate cues (50-74% accuracy)  -MB       Time Frame (Swallow Long Term Goal) by discharge  -TM by discharge  -MB       Barriers (Swallow Long Term Goal) n/a  -TM n/a  -MB       Progress/Outcomes (Swallow Long Term Goal) continuing progress toward goal  -TM new goal  -MB       Comment (Swallow Long Term Goal) -- n/a  -MB          (STG) Patient will tolerate trials of    Consistencies Trialed (Tolerate trials) nectar/ mildly thick liquids  -TM nectar/ mildly thick liquids  -MB       Desired Outcome (Tolerate trials) without signs/symptoms of aspiration;with adequate oral prep/transit/clearance  -TM without signs/symptoms of aspiration;with adequate oral  prep/transit/clearance  -MB       [place]ares (Tolerate trials) with moderate cues (50-74% accuracy)  -TM with moderate cues (50-74% accuracy)  -MB       Time Frame (Tolerate trials) by discharge  -TM by discharge  -MB       Progress/Outcomes (Tolerate trials) continuing progress toward goal  -TM new goal  -MB       Comment (Tolerate trials) n/a  -TM n/a  -MB             User Key  (r) = Recorded By, (t) = Taken By, (c) = Cosigned By    Initials Name Effective Dates    MB Rolando Mayes, CCC-SLP 06/16/21 -     TM Fatmata Lino CCC-SLP 06/16/21 -                 EDUCATION  The patient has been educated in the following areas:   Dysphagia (Swallowing Impairment).        SLP GOALS     Row Name 01/12/23 1335 01/09/23 1521          (LTG) Swallow    (LTG) Swallow Pt will tolerate least restrictive diet with no overt s/s of aspiration.  -TM Pt will tolerate least restrictive diet with no overt s/s of aspiration.  -MB     [place]ares (Swallow Long Term Goal) with moderate cues (50-74% accuracy)  -TM with moderate cues (50-74% accuracy)  -MB     Time Frame (Swallow Long Term Goal) by discharge  -TM by discharge  -MB     Barriers (Swallow Long Term Goal) n/a  -TM n/a  -MB     Progress/Outcomes (Swallow Long Term Goal) continuing progress toward goal  -TM new goal  -MB     Comment (Swallow Long Term Goal) -- n/a  -MB        (STG) Patient will tolerate trials of    Consistencies Trialed (Tolerate trials) nectar/ mildly thick liquids  -TM nectar/ mildly thick liquids  -MB     Desired Outcome (Tolerate trials) without signs/symptoms of aspiration;with adequate oral prep/transit/clearance  -TM without signs/symptoms of aspiration;with adequate oral prep/transit/clearance  -MB     [place]ares (Tolerate trials) with moderate cues (50-74% accuracy)  -TM with moderate cues (50-74% accuracy)  -MB     Time Frame (Tolerate trials) by discharge  -TM by discharge  -MB     Progress/Outcomes (Tolerate trials) continuing progress  toward goal  -TM new goal  -MB     Comment (Tolerate trials) n/a  -TM n/a  -MB           User Key  (r) = Recorded By, (t) = Taken By, (c) = Cosigned By    Initials Name Provider Type    Rolando Vo, CCC-SLP Speech and Language Pathologist    Fatmata Le CCC-SLP Speech and Language Pathologist                   Time Calculation:    Time Calculation- SLP     Row Name 01/12/23 1458             Time Calculation- SLP    SLP Start Time 1335  -TM      SLP Stop Time 1445  -TM      SLP Time Calculation (min) 70 min  -TM      SLP Received On 01/12/23  -TM         Untimed Charges    [id number] Treatment Swallow Minutes 70  -TM         Total Minutes    Untimed Charges Total Minutes 70  -TM       Total Minutes 70  -TM            User Key  (r) = Recorded By, (t) = Taken By, (c) = Cosigned By    Initials Name Provider Type     Fatmata Lino CCC-SLP Speech and Language Pathologist                Therapy Charges for Today     Code Description Service Date Service Provider Modifiers Qty    [id number]  ST TREATMENT SWALLOW 5 1/12/2023 Fatmata Lino CCC-SLP GN 1               LORETTA Ogden  1/12/2023

## 2023-01-12 NOTE — PROGRESS NOTES
Nephrology ([facility]) Progress Note      Patient:  Susana Delcid  YOB: 1940  Date of Service: 1/12/2023  MRN: [id number]   Acct: [id number]   Primary Care Physician: Provider, No Known  Advance Directive:   Code Status and Medical Interventions:   Ordered at: 01/05/23 3185     Level Of Support Discussed With:    Patient     Code Status (Patient has no pulse and is not breathing):    CPR (Attempt to Resuscitate)     Medical Interventions (Patient has pulse or is breathing):    Full Support     Admit Date: 1/5/2023       Hospital Day: 7  Referring Provider: Dony Samuels, DO      Patient personally seen and examined.  Complete chart including Consults, Notes, Operative Reports, Labs, Cardiology, and Radiology studies reviewed as able.        Subjective:  Susana Delcid is a 82 y.o. female for whom we were consulted for evaluation and treatment of acute kidney injury, hypernatremia, hypokalemia.  History of dementia.  Resides at [facility].  Brought to ER with nausea and vomiting on 1/05.  Imaging revealed small bowel obstruction and pneumonia. Creatinine was 3.4. Hospital course remarkable for some improvement of renal function and for some fluctuation of sodium and potassium levels. Sodium improving with change to hypotonic IV fluids.    Today is awake. She does seem a little more aware and interactive compared to previous days. No new overnight issues.    Allergies:  Aspirin, Codeine, and Motrin [ibuprofen]    Home Meds:  Medications Prior to Admission   Medication Sig Dispense Refill Last Dose   • acetaminophen (TYLENOL) 325 MG tablet Take 650 mg by mouth Every 6 (Six) Hours As Needed for Mild Pain .   Past Week   • ARIPiprazole (ABILIFY) 5 MG tablet Take 5 mg by mouth Daily.   1/5/2023   • carvedilol (COREG) 12.5 MG tablet Take 12.5 mg by mouth 2 (Two) Times a Day With Meals.   1/5/2023   • cholecalciferol (VITAMIN D3) 1.25 MG (42123 UT) capsule Take 50,000  Units by mouth 1 (One) Time Per Week. One time a day every Tue for supplement   Past Week   • cloNIDine (CATAPRES) 0.1 MG tablet Take 0.1 mg by mouth Daily As Needed for High Blood Pressure. 1 tablet every 24 hours as needed for high BP for SBP greater than 180, DBP greater than 90   Past Month   • furosemide (LASIX) 40 MG tablet Take 40 mg by mouth Daily.   1/5/2023   • hydrALAZINE (APRESOLINE) 50 MG tablet Take 1 tablet by mouth Every 8 (Eight) Hours.   1/5/2023   • isosorbide dinitrate (ISORDIL) 20 MG tablet Take 1 tablet by mouth Every 8 (Eight) Hours.   1/5/2023   • potassium chloride (MICRO-K) 10 MEQ CR capsule Take 4 capsules by mouth Daily.   1/5/2023   • PARoxetine (PAXIL) 10 MG tablet Take 10 mg by mouth Every Morning.   Unknown       Medicines:  Current Facility-Administered Medications   Medication Dose Route Frequency Provider Last Rate Last Admin   • acetaminophen (TYLENOL) tablet 650 mg  650 mg Oral Q4H PRN Dony Samuels DO       • albuterol (PROVENTIL) nebulizer solution 0.5% 2.5 mg/0.5mL  1.25 mg Nebulization 4x Daily - RT Dony Samuels DO   1.25 mg at 01/12/23 1033    And   • ipratropium (ATROVENT) nebulizer solution 0.5 mg  0.5 mg Nebulization 4x Daily - RT Dony Samuels DO   0.5 mg at 01/12/23 1033   • carvedilol (COREG) tablet 12.5 mg  12.5 mg Oral BID With Meals Seamus Ruvalcaba APRN   12.5 mg at 01/12/23 0917   • cefTRIAXone (ROCEPHIN) 1 g in sodium chloride 0.9 % 100 mL IVPB-VTB  1 g Intravenous Q24H Dony Samuels  mL/hr at 01/11/23 2054 1 g at 01/11/23 2054   • dextrose (D5W) 5 % infusion  75 mL/hr Intravenous Continuous Tayo Napoles MD 75 mL/hr at 01/11/23 1634 75 mL/hr at 01/11/23 1634   • ondansetron (ZOFRAN) injection 4 mg  4 mg Intravenous Q6H PRN Dony Samuels, DO       • silver sulfadiazine (SILVADENE, SSD) 1 % cream   Topical Q12H PRN Dony Samuels, DO       • sodium chloride 0.9 % flush 10 mL  10 mL Intravenous Q12H Dony Samuels, DO   10  mL at 01/12/23 0917   • sodium chloride 0.9 % flush 10 mL  10 mL Intravenous PRN Dony Samuels DO       • sodium chloride 0.9 % infusion 40 mL  40 mL Intravenous PRN Dony Samuels DO       • sodium chloride nasal spray 2 spray  2 spray Each Nare PRClaudio Karimi MD   2 spray at 01/08/23 1634       Past Medical History:  Past Medical History:   Diagnosis Date   • Hyperlipidemia    • Hypertension        Past Surgical History:  Past Surgical History:   Procedure Laterality Date   • APPENDECTOMY     • HYSTERECTOMY     • TONSILLECTOMY     • WRIST SURGERY Right        Family History  History reviewed. No pertinent family history.    Social History  Social History     Socioeconomic History   • Marital status:    Tobacco Use   • Smoking status: Never   • Smokeless tobacco: Never   Substance and Sexual Activity   • Alcohol use: No   • Drug use: No   • Sexual activity: Defer       Review of Systems:  History obtained from chart review and the patient  General ROS: No fever or chills  Respiratory ROS: No cough, shortness of breath, wheezing  Cardiovascular ROS: No chest pain or palpitations  Gastrointestinal ROS: No abdominal pain or melena  Genito-Urinary ROS: No dysuria or hematuria  Psych ROS: No anxiety and depression  14 point ROS reviewed with the patient and negative except as noted above and in the HPI unless unable to obtain.    Objective:  Patient Vitals for the past 24 hrs:   BP Temp Temp src Pulse Resp SpO2 Weight   01/12/23 1047 -- -- -- 82 16 96 % --   01/12/23 1033 -- -- -- 84 16 94 % --   01/12/23 0800 158/84 97.6 °F (36.4 °C) Oral 72 18 94 % --   01/12/23 0651 -- -- -- -- -- 93 % --   01/12/23 0324 155/86 97.9 °F (36.6 °C) Axillary 75 18 94 % 47 kg (103 lb 11.2 oz)   01/11/23 2343 125/65 98.2 °F (36.8 °C) Axillary 75 18 90 % --   01/11/23 2234 -- -- -- 89 16 94 % --   01/11/23 2229 -- -- -- 87 18 93 % --   01/11/23 1943 145/70 98.2 °F (36.8 °C) Axillary 80 16 90 % --   01/11/23 3257  170/86 98.6 °F (37 °C) Axillary 89 18 91 % --   01/11/23 1420 -- -- -- 92 18 94 % --   01/11/23 1413 -- -- -- 68 20 93 % --   01/11/23 1218 129/72 97.8 °F (36.6 °C) Axillary 74 18 92 % --       Intake/Output Summary (Last 24 hours) at 1/12/2023 1106  Last data filed at 1/12/2023 0800  Gross per 24 hour   Intake --   Output 1100 ml   Net -1100 ml     General: awake, oriented X 1  Chest:  clear to auscultation bilaterally without respiratory distress  CVS: regular rate and rhythm  Abdominal: soft, nontender, positive bowel sounds  Extremities: no cyanosis or edema  Skin: warm and dry without rash      Labs:  Results from last 7 days   Lab Units 01/12/23  0754 01/11/23  1330 01/11/23  0504   WBC 10*3/mm3 10.48 10.38 12.21*   HEMOGLOBIN g/dL 11.8* 11.6* 12.3   HEMATOCRIT % 37.4 39.1 43.1   PLATELETS 10*3/mm3 250 247 225         Results from last 7 days   Lab Units 01/12/23  0754 01/11/23  1731 01/11/23  1330 01/11/23  0504 01/10/23  1232 01/10/23  0456 01/08/23  1228 01/08/23  0323   SODIUM mmol/L 140 141 142 149*   < > 153*   < > 160*   POTASSIUM mmol/L 3.7 4.0 4.0 3.8   < > 3.7   < > 3.1*   CHLORIDE mmol/L 93* 92* 93* 96*   < > 101   < > 105   CO2 mmol/L 37.0* 37.0* 38.0* 38.0*   < > 46.0*   < > 44.0*   BUN mg/dL 74* 73* 73* 69*   < > 60*   < > 64*   CREATININE mg/dL 4.25* 4.49* 4.03* 3.89*   < > 2.49*   < > 1.87*   CALCIUM mg/dL 8.4* 8.6 8.0* 8.7   < > 9.5   < > 9.2   EGFR mL/min/1.73 10.0* 9.4* 10.6* 11.1*   < > 19.0*   < > 26.8*   BILIRUBIN mg/dL  --   --   --  0.3  --  0.2  --  0.2   ALK PHOS U/L  --   --   --  94  --  85  --  68   ALT (SGPT) U/L  --   --   --  39*  --  20  --  12   AST (SGOT) U/L  --   --   --  40*  --  28  --  14   GLUCOSE mg/dL 111* 104* 104* 103*   < > 175*   < > 154*    < > = values in this interval not displayed.       Radiology:   Imaging Results (Last 72 Hours)     Procedure Component Value Units Date/Time    US Renal Bilateral [[id number]] Collected: 01/10/23 1430     Updated: 01/10/23  1436    Narrative:      HISTORY: Acute kidney injury     Renal ultrasound: Sonographic imaging of the kidneys and bladder is  performed.     COMPARISON: CT abdomen pelvis on 01/05/2023     FINDINGS: The kidneys appear isoechoic compared to the liver. Right  kidney measures 8.7 x 5.0 x 4.8 cm. There is a 1.7 cm inferior right  renal cyst with few internal echoes supporting a proteinaceous cyst. The  left kidney measures 9.6 x 5.2 x 4.9 cm. There is a 2.6 cm superior  right renal cyst with a 3.7 cm mid lateral left renal cyst. No  convincing solid renal mass. No hydronephrosis. No obstructing  intrarenal stones. No abnormal perinephric fluid collection.     Bladder appears normal. Visible abdominal aorta is normal in caliber.  Proximal IVC is patent.       Impression:      1. Simple appearing left renal cysts with a 1.7 cm  proteinaceous/hemorrhagic right renal cyst. No convincing solid renal  mass. No hydronephrosis.  This report was finalized on 01/10/2023 14:33 by Dr. Mindy Park MD.    XR Chest 1 View [[id number]] Collected: 01/09/23 1429     Updated: 01/09/23 1434    Narrative:      EXAMINATION: XR CHEST 1 VW- 1/9/2023 2:29 PM CST     HISTORY: Worsening pulmonary status; K56.609-Unspecified intestinal  obstruction, unspecified as to partial versus complete obstruction;  Z78.9-Other specified health status; Z74.09-Other reduced mobility     REPORT: A frontal view the chest was obtained.     COMPARISON: Chest x-ray 01/05/2023.     The right lung is clear, there is partial consolidation of the left base  with most of the left hemidiaphragm is obscured. There is also mild  elevation of left hemidiaphragm. The side-port and tip of the NG tube  appears to be at the gastric fundus level, satisfactory. No pneumothorax  is identified. A small left pleural effusion is not excluded. Contrast  is noted within the upper colon. Diffuse osteopenia is present. There  appear to be multiple chronic insufficiency compression  deformities in  the thoracic spine.       Impression:      Continued consolidation of the medial left lung base with  obscuration of most of the left hemidiaphragm, which may represent  pneumonia or atelectasis. There also appears to be a small left pleural  effusion. The newly inserted NG tube appears to be in satisfactory  position.  This report was finalized on 01/09/2023 14:31 by Dr. Gilmer Santizo MD.    XR Abdomen KUB [[id number]] Collected: 01/09/23 0931     Updated: 01/09/23 1350    Narrative:      EXAMINATION: XR ABDOMEN KUB- 1/9/2023 9:31 AM CST     HISTORY: NG tube placement; K56.609-Unspecified intestinal obstruction,  unspecified as to partial versus complete obstruction.     REPORT: A supine view of the upper abdomen was obtained.     COMPARISON: KUB 01/09/2023 0338 hours.     The tip and side-port of the NG tube project over the gastric fundus in  satisfactory position. This is stable. There is moderate volume loss in  the left lung base. The visualized bowel gas pattern appears  nonobstructive. Moderately heavy calcified plaque is noted within the  abdominal aorta and iliac arteries.       Impression:      Stable satisfactory position of the NG tube with the tip and  side-port in the gastric fundus.  This report was finalized on 01/09/2023 09:33 by Dr. Gilmer Santizo MD.    FL Small Bowel Follow Through Single-Contrast [[id number]] Collected: 01/09/23 1238     Updated: 01/09/23 1244    Narrative:      EXAMINATION: FL SMALL BOWEL FOLLOW THROUGH SINGLE-CONTRAST- 1/9/2023  12:38 PM CST     HISTORY: Patient with small bowel obstruction please evaluate with a  small bowel follow-through can  complete through the NG tube;  K56.609-Unspecified intestinal obstruction, unspecified as to partial  versus complete obstruction.     REPORT: Small bowel follow-through was performed with administration of  contrast through the existing NG tube.     COMPARISON: KUB on same day.     After contrast was administered  through the NG tube, images were  obtained at 15 minutes, 30 minutes and 2 hours. At 15 minutes, the  stomach is mostly opacified and there is contrast within the upper  abdominal loops of jejunum, which are mildly distended, measuring up to  4.2 cm. No jejunal mucosal thickening is identified. There is a small  duodenal diverticulum. At 30 minutes, most of the contrast is seen in  the jejunum and proximal ileum, without mass effect. At 2 hours,  contrast is seen throughout the small intestine and in the colon to the  level of the rectum. There is residual contrast in the stomach as well.       Impression:      Contrast reaches the rectum by 2 hours and there is no  evidence of small bowel obstruction.  This report was finalized on 01/09/2023 12:41 by Dr. Gilmer Santizo MD.          Culture:  Blood Culture   Date Value Ref Range Status   01/05/2023 No growth at 4 days  Preliminary   01/05/2023 No growth at 4 days  Preliminary         Assessment   1.  Acute kidney injury, worse today  2.  Hypernatremia--improving  3.  Hypokalemia--improved  4.  Small bowel obstruction  5.  LLL pneumonia  6.  Combined systolic/diastolic CHF    Plan:  1.  Continue gentle IV fluids  2.  Minimal improvement in renal function overnight. Continue to to recommend not starting dialysis as patient would be very unlikely to tolerate due to her advanced dementia. Palliative care APRN has been pursuing paperwork to permit a change in medical priorities.        1/12/2023  11:06 CST

## 2023-01-12 NOTE — PLAN OF CARE
Goal Outcome Evaluation:  Plan of Care Reviewed With: patient        Progress: no change  Outcome Evaluation: PATIENT ORIENTED TO SELF ONLY. NO C/O PAIN. SATS WNL ON RA. BUN 73, CREAT 4.49. PALLIATIVE CARE CONSULTED. D5 INFUSING AT 75 ML/HR. RECEIVING IV ANTIBIOTICS. PATIENT FRUSTRATED AND REFUSING THICKENED LIQUIDS. TURN Q2H. CONT TO MONITOR.

## 2023-01-12 NOTE — PROGRESS NOTES
[facility] Medicine Services  INPATIENT PROGRESS NOTE    Patient Name: Susana Delcid  Date of Admission: 1/5/2023  Today's Date: 01/12/23  Length of Stay: 7  Primary Care Physician: Provider, No Known    Subjective   Chief Complaint: Bowel obstruction  Patient lying in bed on room air.  She is drinking a strawberry Ensure.  She asked me if she can have another one.  I assured her we would get her another 1 to drink.  This is the most she has spoken to me during my entire time caring for her.  When asked if she was in pain or if there was anything I could do for her, she did not respond.    ROS:  Unable to do obtain due to dementia and altered mental status.  Otherwise as stated above.  All pertinent negatives and positives are as above. All other systems have been reviewed and are negative unless otherwise stated.     Objective    Temp:  [97.6 °F (36.4 °C)-98.6 °F (37 °C)] 97.6 °F (36.4 °C)  Heart Rate:  [68-92] 72  Resp:  [16-20] 18  BP: (125-170)/(65-86) 158/84  Physical Exam  Vitals and nursing note reviewed.   Constitutional:       Comments: Cachectic.  Chronically ill.  Advanced age.   HENT:      Head: Normocephalic and atraumatic.   Eyes:      Conjunctiva/sclera: Conjunctivae normal.      Pupils: Pupils are equal, round, and reactive to light.   Neck:      Vascular: No JVD.   Cardiovascular:      Rate and Rhythm: Normal rate and regular rhythm.      Heart sounds: Normal heart sounds.   Pulmonary:      Effort: No respiratory distress.      Breath sounds: No wheezing or rales.      Comments: Diminished breath sound bilateral, clear, on room air  Chest:      Chest wall: No tenderness.   Abdominal:      General: Bowel sounds are normal. There is no distension.      Palpations: Abdomen is soft.      Tenderness: There is no abdominal tenderness.   Musculoskeletal:         General: No tenderness or deformity.      Cervical back: Neck supple.   Skin:     General: Skin is warm and  dry.      Capillary Refill: Capillary refill takes 2 to 3 seconds.      Findings: No rash.   Neurological:      Cranial Nerves: No cranial nerve deficit.      Motor: Weakness present. No abnormal muscle tone.      Coordination: Coordination abnormal.      Gait: Gait abnormal.      Deep Tendon Reflexes: Reflexes normal.           Results Review:  I have reviewed the labs, radiology results, and diagnostic studies.    Laboratory Data:   Results from last 7 days   Lab Units 01/12/23  0754 01/11/23  1330 01/11/23  0504   WBC 10*3/mm3 10.48 10.38 12.21*   HEMOGLOBIN g/dL 11.8* 11.6* 12.3   HEMATOCRIT % 37.4 39.1 43.1   PLATELETS 10*3/mm3 250 247 225        Results from last 7 days   Lab Units 01/12/23  0754 01/11/23  1731 01/11/23  1330 01/11/23  0504 01/10/23  1232 01/10/23  0456 01/08/23  1228 01/08/23  0323   SODIUM mmol/L 140 141 142 149*   < > 153*   < > 160*   POTASSIUM mmol/L 3.7 4.0 4.0 3.8   < > 3.7   < > 3.1*   CHLORIDE mmol/L 93* 92* 93* 96*   < > 101   < > 105   CO2 mmol/L 37.0* 37.0* 38.0* 38.0*   < > 46.0*   < > 44.0*   BUN mg/dL 74* 73* 73* 69*   < > 60*   < > 64*   CREATININE mg/dL 4.25* 4.49* 4.03* 3.89*   < > 2.49*   < > 1.87*   CALCIUM mg/dL 8.4* 8.6 8.0* 8.7   < > 9.5   < > 9.2   BILIRUBIN mg/dL  --   --   --  0.3  --  0.2  --  0.2   ALK PHOS U/L  --   --   --  94  --  85  --  68   ALT (SGPT) U/L  --   --   --  39*  --  20  --  12   AST (SGOT) U/L  --   --   --  40*  --  28  --  14   GLUCOSE mg/dL 111* 104* 104* 103*   < > 175*   < > 154*    < > = values in this interval not displayed.       Culture Data:   Blood Culture   Date Value Ref Range Status   01/05/2023 No growth at 2 days  Preliminary   01/05/2023 No growth at 2 days  Preliminary       I have reviewed the patient's current medications.     Assessment/Plan   Assessment  Active Hospital Problems    Diagnosis    • **SBO (small bowel obstruction) (HCC)    • Unspecified bacterial pneumonia    • Acute on chronic combined systolic and diastolic  CHF (congestive heart failure) (HCC)    • Acute respiratory failure with hypoxia (HCC)    • LETITIA (acute kidney injury) ([facility])    • Stage 3b chronic kidney disease (CKD) ([facility])    • Hyperkalemia    • Essential hypertension        Treatment Plan  Patient is a 82-year-old female who presents to [facility] emergency department from [facility].  Patient has history of dementia, hyperlipidemia, hypertension.  This list is not at all exclusive as full history is limited at this time with patient being a poor historian. She presents with chief complaint of nausea and vomiting, the patient has abdominal distention, NG tube placed in emergency department.  CT scan abdomen shows small bowel obstruction.  Patient initially tachycardic and hypoxic requiring Vapotherm nasal cannula supplementation.  Creatinine on presentation 3.4, creatinine of 1.35 and September 2021.  GFR on presentation was 13, GFR in September 2021 was 38.     Small bowel obstruction-   NG tube initially placed in stomach and hooked to low intermittent suction. Patient pulled NG tube out.  Plan to reinstate NG tube.  Dr. Gabriel with general surgery consulted.Small bowel follow-through conducted 1/9/2023.  Bowel obstruction resolved.  General surgery recommended advancing diet with discontinuation of NG tube.  SLP evaluated.  Currently on full liquid diet with thin liquids.  General surgery signing off unless otherwise needed.  Full liquid diet tolerated for now.     Acute kidney injury superimposed on stage 3b chronic kidney disease-  Creatinine 3.4, BUN 83, GFR 13.1 on arrival.  These values are worsened from baseline of stage IIIb chronic kidney disease. Creatinine 1.35 and GFR 38 in September 2021.  Nephrology consulted.  Diuretic initiated 1/9/2023 in the presence of congestive  heart failure and worsening respiratory status and discontinued 1/10/2023 due to slight increase in creatinine.  Diuresis was successful and allowed for  improved respiratory status.  Renal ultrasound obtained.  Today, creatinine 4.25, BUN 74, GFR 10, potassium 3.7.  Nephrology continues to follow.  IV D5 at 75 mL an hour.  Serial BMPs continue.     Hypoxia-  Respiratory status worsened and required Vapotherm 60% on 1/9/2023.  Chest x-ray obtained.  Decision to diurese.  Patient on room air at this time.    Hypernatremia-  Sodium improved to 140 today.  IV D5 at 75 mL an hour     Left lower lobe pneumonia-  WBC 10.48  Blood cultures x2 no growth at 5 days.  COVID-negative.  Repeat chest x-ray 1/9/2023.  Patient currently on room air with O2 saturation 92%. Azithromycin and Rocephin IV continued.  CBC in a.m.     Chronic combined systolic and diastolic heart failure/hypertension-  Echocardiogram from January 2018 shows EF 31 to 35% with diastolic dysfunction.  We will be mindful of this with IV hydration.  We will hold home Lasix for now.   Patient diuresed on 1/9/2023 for worsened respiratory status.  Diuretic discontinued the morning of 1/10/2023 for increased creatinine.  Diuresis was successful.  Patient on room air.  IV metoprolol transitioned to home Coreg on 1/10/2023 as patient as patient was cleared by SLP for p.o. intake.     Hypokalemia  Potassium 3.7.  Nephrology following.    Palliative care consulted and closely following and communicating with healthcare surrogate, Margarito Steel.     [place] and a full code.     Medical Decision Making  Number and Complexity of problems: 3 high complexity problems in acute kidney injury and small bowel obstruction and hypoxia.  3 moderate complexity problems in pneumonia and chronic combined systolic and diastolic heart failure and hypokalemia and hypernatremia  Differential Diagnosis: None     Conditions and Status  Small bowel obstruction resolved  Acute kidney injury  severe but stable  Left lower lobe pneumonia stable  Chronic combined systolic and diastolic heart failure   stable  Hypokalemia resolved.    Hypernatremia resolved currently  Hypoxia improved     MDM Data  External documents reviewed: Echocardiogram from 2018  Cardiac tracing (EKG, telemetry) interpretation: Telemetry shows trigeminy  Radiology interpretation: Reviewed radiologist interpretation of chest x-ray/KUB  Labs reviewed: Arterial blood gas, CMP, CBC with differential, urinalysis  Any tests that were considered but not ordered: Echocardiogram     Decision rules/scores evaluated (example [id number], Wells, etc): None     Discussed with: Patient, although I do not believe she understands.     Care Planning  Shared decision making: Discussed plan of care with patient in the presence of cognitive disability.  Code status and discussions: Full code, patient [place] a [place].     Disposition  Social Determinants of Health that impact treatment or disposition: Patient resides at a skilled nursing facility  Discharge disposition unable to determine at this time due to worsening renal function.    Electronically signed by JAMIR Worthy, 01/12/23, 09:34 CST.

## 2023-01-12 NOTE — THERAPY DISCHARGE NOTE
Acute Care - Physical Therapy Treatment Note/Discharge  [facility]     Patient Name: Susana Delcid  : 1940  MRN: [id number]  Today's Date: 2023                Admit Date: 2023    Visit Dx:    ICD-10-CM ICD-9-CM   1. Small bowel obstruction (HCC)  K56.609 560.9   2. Decreased activities of daily living (ADL)  Z78.9 V49.89   3. Impaired mobility  Z74.09 799.89   4. Dysphagia, unspecified type  R13.10 787.20     Patient Active Problem List   Diagnosis   • Acute systolic congestive heart failure (HCC)   • Elevated troponin level   • Iron deficiency anemia   • Tachycardia   • Elevated d-dimer   • UTI (urinary tract infection)   • Essential hypertension   • Mixed hyperlipidemia   • Pleural effusion, bilateral   • SBO (small bowel obstruction) (HCC)   • LETITIA (acute kidney injury) (HCC)   • Stage 3b chronic kidney disease (CKD) (HCC)   • Left lower lobe pneumonia   • Hyperkalemia   • Chronic combined systolic (congestive) and diastolic (congestive) heart failure (HCC)   • Acute respiratory failure with hypoxia (HCC)   • Unspecified bacterial pneumonia   • Acute on chronic combined systolic and diastolic CHF (congestive heart failure) (HCC)     Past Medical History:   Diagnosis Date   • Hyperlipidemia    • Hypertension      Past Surgical History:   Procedure Laterality Date   • APPENDECTOMY     • HYSTERECTOMY     • TONSILLECTOMY     • WRIST SURGERY Right        PT Assessment (last 12 hours)     PT Evaluation and Treatment     Row Name 23 1155          Physical Therapy Time and Intention    Session Not Performed patient/family declined treatment  -     Comment, Session Not Performed Pt. continues to refuse any activity or ROM. Pt's cognitive status is impaired and is not participating. PT will discharge due to lack of participation.  -     Row Name 23 1155          Plan of Care Review    Plan of Care Reviewed With patient  -MF     Progress declining  -     Outcome Evaluation PT session  attempted again today. Pt. continues to refuse any activity or ROM. Pt. is adamant about her refusals. Pt's congitive status is impaired and she has not agreed to therapy since the evaluation. PT will discharge at this time due to lack of participation.  -MF     Row Name 01/12/23 1155          Bed Mobility Goal 1 (PT)    Activity/Assistive Device (Bed Mobility Goal 1, PT) sit to supine/supine to sit  -MF     [place] Level/Cues Needed (Bed Mobility Goal 1, PT) moderate assist (50-74% patient effort)  -MF     Time Frame (Bed Mobility Goal 1, PT) long term goal (LTG);10 days  -MF     Progress/Outcomes (Bed Mobility Goal 1, PT) goal not met  -MF     Row Name 01/12/23 1155          Transfer Goal 1 (PT)    Activity/Assistive Device (Transfer Goal 1, PT) sit-to-stand/stand-to-sit;bed-to-chair/chair-to-bed  -MF     [place] Level/Cues Needed (Transfer Goal 1, PT) minimum assist (75% or more patient effort)  -MF     Time Frame (Transfer Goal 1, PT) long term goal (LTG);10 days  -MF     Progress/Outcome (Transfer Goal 1, PT) goal not met  -MF     Row Name 01/12/23 1155          Gait Training Goal 1 (PT)    Activity/Assistive Device (Gait Training Goal 1, PT) gait (walking locomotion);assistive device use;decrease fall risk;improve balance and speed;increase endurance/gait distance  -     [place] Level (Gait Training Goal 1, PT) minimum assist (75% or more patient effort)  -MF     Distance (Gait Training Goal 1, PT) 15 ft  -MF     Time Frame (Gait Training Goal 1, PT) long term goal (LTG);10 days  -MF     Progress/Outcome (Gait Training Goal 1, PT) goal not met  -           User Key  (r) = Recorded By, (t) = Taken By, (c) = Cosigned By    Initials Name Provider Type    Esther Kaiser PTA Physical Therapist Assistant                  Physical Therapy Education     Title: PT OT SLP Therapies (In Progress)     Topic: Physical Therapy (In Progress)     Point: Mobility training (In Progress)     Learning  Progress Summary           Patient Acceptance, E, NR by [facility] at 1/9/2023 0942    Comment: benefits of activity, progression of PT POC                   Point: Home exercise program (Not Started)     Learner Progress:  Not documented in this visit.          Point: Body mechanics (Not Started)     Learner Progress:  Not documented in this visit.          Point: Precautions (Not Started)     Learner Progress:  Not documented in this visit.                      User Key     Initials Effective Dates Name Provider Type Discipline    [facility] 08/02/16 -  Fabian Pena, PT DPT Physical Therapist PT                PT Recommendation and Plan  Anticipated Discharge Disposition (PT): skilled nursing facility  Plan of Care Reviewed With: patient  Progress: declining  Outcome Evaluation: PT session attempted again today. Pt. continues to refuse any activity or ROM. Pt. is adamant about her refusals. Pt's congitive status is impaired and she has not agreed to therapy since the evaluation. PT will discharge at this time due to lack of participation.     Outcome Measures     Row Name 01/10/23 0804             How much help from another person do you currently need...    Turning from your back to your side while in flat bed without using bedrails? 2  -TB      Moving from lying on back to sitting on the side of a flat bed without bedrails? 2  -TB      Moving to and from a bed to a chair (including a wheelchair)? 1  -TB      Standing up from a chair using your arms (e.g., wheelchair, bedside chair)? 1  -TB      Climbing 3-5 steps with a railing? 1  -TB      To walk in hospital room? 1  -TB      AM-PAC 6 Clicks Score (PT) 8  -TB         Functional Assessment    Outcome Measure Options AM-PAC 6 Clicks Basic Mobility (PT)  -TB            User Key  (r) = Recorded By, (t) = Taken By, (c) = Cosigned By    Initials Name Provider Type    TB Bina Chun, PTA Physical Therapist Assistant                 Time Calculation:         PT  G-Codes  Outcome Measure Options: AM-PAC 6 Clicks Basic Mobility (PT)  AM-PAC 6 Clicks Score (PT): 8  AM-PAC 6 Clicks Score (OT): 9    PT Discharge Summary  Anticipated Discharge Disposition (PT): skilled nursing facility  Reason for Discharge: Unable to participate  Outcomes Achieved: Unable to make functional progress toward goals at this time  Discharge Destination: SNF    Esther Marte, PTA  1/12/2023

## 2023-01-12 NOTE — PLAN OF CARE
Goal Outcome Evaluation:  Plan of Care Reviewed With: patient        Progress: declining  Outcome Evaluation: PT session attempted again today. Pt. continues to refuse any activity or ROM. Pt. is adamant about her refusals. Pt's congitive status is impaired and she has not agreed to therapy since the evaluation. PT will discharge at this time due to lack of participation.

## 2023-01-12 NOTE — PLAN OF CARE
Problem: Adult Inpatient Plan of Care  Goal: Plan of Care Review  Outcome: Ongoing, Progressing  Flowsheets (Taken 1/12/2023 1075)  Progress: improving  Plan of Care Reviewed With: (RN, Joaquín)  • patient  • other (see comments)  Outcome Evaluation: SLP dysphagia tx completed. Pt sleeping upon SLP arrival, required mod verbal cues to awaken. Pt exhibited significant difficulty understanding spoken communication given hearing impairment, yet stated she would be unable to see when SLP attempted to write as an alternative method of communication. Pt requested a strawberry milkshake, RN reported pt has consumed strawberry Boost more than most provided PO. SLP obtained Boost (observed to be a slightly thick vs thin consistency) from dietary staff, provided initial trials via spoon to control bolus size given concerns with Rolando Mayes MS, CCC-SLP at bedside during prior tx session. Pt c/o dislike for SLP controlling method of presentation, yet consumed trials via spoon with cues. She was observed to have reduced labial seal and resistance on spoon, yet no further concerns and appeared to tolerate, as no overt s/s of aspiration or additional concerns were observed. She continued to request to hold the cup and self feed, therefore, pt was allowed to consume trials via straw, which she consumed in serial form at times. Mild increase in work of breathing, 1x delayed cough, 1x vocal quality change. SLP then thickened Boost to a nectar thick consistency, which pt continued to consume via straw. She made verbal mention of realizing a difference in the consistency, required max cues to understand basic reasoning for SLP thickening the provided liquid. She was agreeable to regular solid trials, initially only agreeing to one trial, yet later requesting further trials. She self fed the regular solid trial with mildly to moderately reduced mandibular bite strength, reduced and slowed rotary mandibular movement with  mastication, yet functional and tolerated without overt s/s of aspiration or significant oral residue. Educated RN on observed concerns and plan. Cannot fully r/o aspiration at this time. RECS: Upgrade to mechanical soft diet consistency (as Dr. Cordova's note on 01/09/23 showed clearance for diet advancement), continue with nectar thick liquid consistency; Meds whole in pudding/applesauce and/or with nectar thick liquids; RN to monitor for increased lung congestion; Distant supervision from staff with PO intake. ST to continue to follow and tx for dysphagia. Thanks!

## 2023-01-12 NOTE — PROGRESS NOTES
Appreciate the other's notes, at present no further bowel problems, she is drinking and eating well, question of her mental stability is noted, and considering palliative care moved.  There is no obstructive findings noted she is eating and drinking well gas and bowel movements are occurring, no general surgical issues.  We will sign off please call if questions or problems arise.

## 2023-01-12 NOTE — PROGRESS NOTES
[facility] Palliative Care Services  Progress Note  Patient Name: Susana Delcid  Date of Admission: 1/5/2023  Today's Date: 01/12/23     Code Status and Medical Interventions:   Ordered at: 01/05/23 2214     Level Of Support Discussed With:    Patient     Code Status (Patient has no pulse and is not breathing):    CPR (Attempt to Resuscitate)     Medical Interventions (Patient has pulse or is breathing):    Full Support     Subjective   Chief complaint/Reason for Referral/Visit: Follow up on Goals of Care/Advance Care Planning.    Medical record reviewed. Events noted.  Labs collected this morning reveal slight improvement in renal function with creatinine 4.25, BUN 74 and GFR 10.0 however remains significantly impaired.  Sodium remains improved at 140.  CBC mostly unremarkable.  She is sitting up in bed, alert and in no apparent distress at time of exam.  She is able to correctly state her name.  Denies any pain or discomfort.  Drinking a boost supplement at time of exam and requesting an additional one.  Nursing aware of her request.  No visitors present.       Advanced Directives: Guardianship paper on file naming Margarito Steel as her guardian.     Advance Care Planning Discussion: Patient's guardian, Margarito Steel, has provided documentation to be completed regarding changes to medical priorities. Completed by attending and copy has been returned to guardian.     Review of Systems   Unable to perform ROS: other (disoriented and extremely hard of hearing)     Objective   Diagnostics: Reviewed      Intake/Output Summary (Last 24 hours) at 1/12/2023 0829  Last data filed at 1/12/2023 0800  Gross per 24 hour   Intake --   Output 1100 ml   Net -1100 ml     Current Facility-Administered Medications   Medication Dose Route Frequency Provider Last Rate Last Admin   • acetaminophen (TYLENOL) tablet 650 mg  650 mg Oral Q4H PRN Dony Samuels DO       • albuterol (PROVENTIL) nebulizer solution 0.5%  "2.5 mg/0.5mL  1.25 mg Nebulization 4x Daily - RT Dony Samuels DO   1.25 mg at 01/12/23 0651    And   • ipratropium (ATROVENT) nebulizer solution 0.5 mg  0.5 mg Nebulization 4x Daily - RT Dony Samuels DO   0.5 mg at 01/12/23 0651   • carvedilol (COREG) tablet 12.5 mg  12.5 mg Oral BID With Meals Seamus Ruvalcaba APRN   12.5 mg at 01/11/23 1706   • cefTRIAXone (ROCEPHIN) 1 g in sodium chloride 0.9 % 100 mL IVPB-VTB  1 g Intravenous Q24H Dony Samuels  mL/hr at 01/11/23 2054 1 g at 01/11/23 2054   • dextrose (D5W) 5 % infusion  75 mL/hr Intravenous Continuous Tayo Napoles MD 75 mL/hr at 01/11/23 1634 75 mL/hr at 01/11/23 1634   • ondansetron (ZOFRAN) injection 4 mg  4 mg Intravenous Q6H PRN Dony Samuels DO       • silver sulfadiazine (SILVADENE, SSD) 1 % cream   Topical Q12H PRN Dony Samuels DO       • sodium chloride 0.9 % flush 10 mL  10 mL Intravenous Q12H Dony Samuels DO   10 mL at 01/10/23 2150   • sodium chloride 0.9 % flush 10 mL  10 mL Intravenous PRN Dony Samuels DO       • sodium chloride 0.9 % infusion 40 mL  40 mL Intravenous PRN Dony Samuels DO       • sodium chloride nasal spray 2 spray  2 spray Each Nare PRN Claudio Cobb MD   2 spray at 01/08/23 1634     dextrose, 75 mL/hr, Last Rate: 75 mL/hr (01/11/23 1634)      •  acetaminophen  •  ondansetron  •  silver sulfadiazine  •  sodium chloride  •  sodium chloride  •  sodium chloride    Assessment:  Vital Signs: /84 (BP Location: Left arm, Patient Position: Lying)   Pulse 72   Temp 97.6 °F (36.4 °C) (Oral)   Resp 18   Ht 175.3 cm (69\")   Wt 47 kg (103 lb 11.2 oz)   SpO2 94%   BMI 15.31 kg/m²     Physical Exam  Vitals and nursing note reviewed.   Constitutional:       General: She is not in acute distress.     Appearance: She is underweight. She is ill-appearing.   HENT:      Head: Normocephalic and atraumatic.      Right Ear: Decreased hearing noted.      Left Ear: Decreased hearing " noted.   Eyes:      General: Lids are normal.   Neck:      Vascular: No JVD.      Trachea: Trachea normal.   Cardiovascular:      Rate and Rhythm: Normal rate.      Heart sounds: Normal heart sounds.   Pulmonary:      Effort: Pulmonary effort is normal.   Chest:      Chest wall: No deformity or swelling.   Abdominal:      General: There is no distension.      Palpations: Abdomen is soft.   Musculoskeletal:      Cervical back: Neck supple.   Skin:     General: Skin is warm and dry.   Neurological:      Mental Status: She is alert. She is disoriented.   Psychiatric:         Behavior: Behavior is cooperative.         Cognition and Memory: Cognition is impaired.     Functional status: Palliative Performance Scale Score: Performance 30% based on the following measures: Ambulation: Totally bed bound, Activity and Evidence of Disease: Unable to do any work, extensive evidence of disease, Self-Care: Total care required,  Intake: Reduced, LOC: Full, drowsy or confusion.  Nutritional status: Albumin 2.7. Body mass index is 15.31 kg/m².   Patient status: Disease state: Deteriorating despite treatments.    Impression/Problem List:  1. Chronic cerebral microvascular disease - Per CT of head 2018  2. Cognitive deficiency syndrome - Per guardian   3. Small bowel obstruction - Resolved  4. Acute kidney injury  5. Hypernatremia - Resolved   6. Underweight (BMI 15.31)  7. Pneumonia of LLL   8. Pleural effusion, left  9. Essential hypertension  10. Advanced age  11. Chronic combined systolic and diastolic CHF - Per echocardiogram 2018   12. Impaired hearing       Plan / Recommendations     1. Palliative Care Encounter   - Goals of care include CODE STATUS CPR with full support interventions.    - Prognosis is guarded long-term secondary to LETITIA, hyponatremia, underweight, pneumonia of LLL, pleural effusion, left, advanced age, SBO and other comorbidities listed above.    - Renal function slightly improved today however remains  impaired.    - Do not recommend undergoing dialysis or escalation in the care with aggressive life sustaining measures as this would not lead to overall improvement in her quality of life and could potentially result in undue prolong discomfort and suffering.    - Patient's guardian, Margarito Steel, has provided documentation to be completed regarding changes to medical priorities. Discussed with attending and documentation has been completed per attending. Copy returned to guardian, await decision.     Thank you for allowing us to participate in patient's plan of care. Palliative Care Team will continue to follow patient.     Time spent:35 minutes spent reviewing medical and medication records, assessing and examining patient, discussing with family, answering questions, providing some guidance about a plan and documentation of care, and coordinating care with other healthcare members, with > 50% time spent face to face.   Electronically signed by, JAMIR Beaver, 01/12/23.

## 2023-01-13 LAB
ALBUMIN SERPL-MCNC: 2.4 G/DL (ref 3.5–5.2)
ALBUMIN/GLOB SERPL: 0.9 G/DL
ALP SERPL-CCNC: 102 U/L (ref 39–117)
ALT SERPL W P-5'-P-CCNC: 24 U/L (ref 1–33)
ANION GAP SERPL CALCULATED.3IONS-SCNC: 9 MMOL/L (ref 5–15)
AST SERPL-CCNC: 21 U/L (ref 1–32)
BILIRUB SERPL-MCNC: 0.2 MG/DL (ref 0–1.2)
BUN SERPL-MCNC: 85 MG/DL (ref 8–23)
BUN/CREAT SERPL: 20.6 (ref 7–25)
CALCIUM SPEC-SCNC: 7.8 MG/DL (ref 8.6–10.5)
CHLORIDE SERPL-SCNC: 90 MMOL/L (ref 98–107)
CO2 SERPL-SCNC: 36 MMOL/L (ref 22–29)
CREAT SERPL-MCNC: 4.12 MG/DL (ref 0.57–1)
EGFRCR SERPLBLD CKD-EPI 2021: 10.4 ML/MIN/1.73
GLOBULIN UR ELPH-MCNC: 2.8 GM/DL
GLUCOSE SERPL-MCNC: 154 MG/DL (ref 65–99)
POTASSIUM SERPL-SCNC: 3.5 MMOL/L (ref 3.5–5.2)
PROT SERPL-MCNC: 5.2 G/DL (ref 6–8.5)
SODIUM SERPL-SCNC: 135 MMOL/L (ref 136–145)

## 2023-01-13 PROCEDURE — 94760 N-INVAS EAR/PLS OXIMETRY 1: CPT

## 2023-01-13 PROCEDURE — 94799 UNLISTED PULMONARY SVC/PX: CPT

## 2023-01-13 PROCEDURE — 80053 COMPREHEN METABOLIC PANEL: CPT | Performed by: NURSE PRACTITIONER

## 2023-01-13 PROCEDURE — 92526 ORAL FUNCTION THERAPY: CPT

## 2023-01-13 PROCEDURE — 99232 SBSQ HOSP IP/OBS MODERATE 35: CPT

## 2023-01-13 RX ORDER — SODIUM CHLORIDE 9 MG/ML
75 INJECTION, SOLUTION INTRAVENOUS CONTINUOUS
Status: DISCONTINUED | OUTPATIENT
Start: 2023-01-13 | End: 2023-01-16 | Stop reason: HOSPADM

## 2023-01-13 RX ADMIN — IPRATROPIUM BROMIDE 0.5 MG: 0.5 SOLUTION RESPIRATORY (INHALATION) at 06:50

## 2023-01-13 RX ADMIN — ALBUTEROL SULFATE 1.25 MG: 2.5 SOLUTION RESPIRATORY (INHALATION) at 19:20

## 2023-01-13 RX ADMIN — Medication 10 ML: at 20:50

## 2023-01-13 RX ADMIN — CARVEDILOL 12.5 MG: 6.25 TABLET, FILM COATED ORAL at 17:33

## 2023-01-13 RX ADMIN — SODIUM CHLORIDE 100 ML/HR: 9 INJECTION, SOLUTION INTRAVENOUS at 14:31

## 2023-01-13 RX ADMIN — CARVEDILOL 12.5 MG: 6.25 TABLET, FILM COATED ORAL at 08:46

## 2023-01-13 RX ADMIN — IPRATROPIUM BROMIDE 0.5 MG: 0.5 SOLUTION RESPIRATORY (INHALATION) at 14:17

## 2023-01-13 RX ADMIN — DEXTROSE MONOHYDRATE 75 ML/HR: 50 INJECTION, SOLUTION INTRAVENOUS at 02:44

## 2023-01-13 RX ADMIN — IPRATROPIUM BROMIDE 0.5 MG: 0.5 SOLUTION RESPIRATORY (INHALATION) at 19:20

## 2023-01-13 RX ADMIN — ALBUTEROL SULFATE 1.25 MG: 2.5 SOLUTION RESPIRATORY (INHALATION) at 06:50

## 2023-01-13 RX ADMIN — ALBUTEROL SULFATE 1.25 MG: 2.5 SOLUTION RESPIRATORY (INHALATION) at 10:29

## 2023-01-13 RX ADMIN — ALBUTEROL SULFATE 1.25 MG: 2.5 SOLUTION RESPIRATORY (INHALATION) at 14:16

## 2023-01-13 RX ADMIN — Medication 10 ML: at 08:46

## 2023-01-13 RX ADMIN — IPRATROPIUM BROMIDE 0.5 MG: 0.5 SOLUTION RESPIRATORY (INHALATION) at 10:29

## 2023-01-13 NOTE — PROGRESS NOTES
[facility] Palliative Care Services  Progress Note  Patient Name: Susana Delcid  Date of Admission: 1/5/2023  Today's Date: 01/13/23     Code Status and Medical Interventions:   Ordered at: 01/05/23 8680     Level Of Support Discussed With:    Patient     Code Status (Patient has no pulse and is not breathing):    CPR (Attempt to Resuscitate)     Medical Interventions (Patient has pulse or is breathing):    Full Support     Subjective   Chief complaint/Reason for Referral/Visit: Follow up on Goals of Care/Advance Care Planning.    Medical record reviewed. Events noted.  No labs collected this morning available for review at this time.  She is alert, sitting up in bed in no apparent distress.  Working with SLP.  Denies any pain.  No visitors present.     Documentation has been completed per attending in regards to change of CODE STATUS/level of interventions.  Completed documentation has been sent to patient's guardian, Margarito Steel, this morning.  Await for decision regarding recommendations.    Advanced Directives: Guardianship paper on file naming Margarito Steel as her guardian.     Review of Systems   Unable to perform ROS: other (disoriented and extremely hard of hearing)     Objective   Diagnostics: Reviewed      Intake/Output Summary (Last 24 hours) at 1/13/2023 0908  Last data filed at 1/12/2023 1800  Gross per 24 hour   Intake 1960 ml   Output --   Net 1960 ml     Current Facility-Administered Medications   Medication Dose Route Frequency Provider Last Rate Last Admin   • acetaminophen (TYLENOL) tablet 650 mg  650 mg Oral Q4H PRN Dony Samuels, DO       • albuterol (PROVENTIL) nebulizer solution 0.5% 2.5 mg/0.5mL  1.25 mg Nebulization 4x Daily - RT Dony Samuels DO   1.25 mg at 01/13/23 0650    And   • ipratropium (ATROVENT) nebulizer solution 0.5 mg  0.5 mg Nebulization 4x Daily - RT Dony Samuels DO   0.5 mg at 01/13/23 0650   • carvedilol (COREG) tablet 12.5 mg  12.5  "mg Oral BID With Meals Seamus Ruvalcaba APRN   12.5 mg at 01/13/23 0846   • dextrose (D5W) 5 % infusion  75 mL/hr Intravenous Continuous Tayo Napoles MD 75 mL/hr at 01/13/23 0244 75 mL/hr at 01/13/23 0244   • ondansetron (ZOFRAN) injection 4 mg  4 mg Intravenous Q6H PRN Dony Samuels DO       • silver sulfadiazine (SILVADENE, SSD) 1 % cream   Topical Q12H PRN Dony Samuels DO       • sodium chloride 0.9 % flush 10 mL  10 mL Intravenous Q12H Dony Samuels DO   10 mL at 01/13/23 0846   • sodium chloride 0.9 % flush 10 mL  10 mL Intravenous PRN Dony Samuels DO       • sodium chloride 0.9 % infusion 40 mL  40 mL Intravenous PRN Dony Samuels DO       • sodium chloride nasal spray 2 spray  2 spray Each Nare PRN Claudio Cobb MD   2 spray at 01/08/23 1634     dextrose, 75 mL/hr, Last Rate: 75 mL/hr (01/13/23 0244)      •  acetaminophen  •  ondansetron  •  silver sulfadiazine  •  sodium chloride  •  sodium chloride  •  sodium chloride    Assessment:  Vital Signs: /69 (BP Location: Right arm, Patient Position: Lying)   Pulse 73   Temp 97.9 °F (36.6 °C) (Oral)   Resp 16   Ht 175.3 cm (69\")   Wt 47.4 kg (104 lb 9.6 oz)   SpO2 91%   BMI 15.45 kg/m²     Physical Exam  Vitals and nursing note reviewed.   Constitutional:       General: She is not in acute distress.     Appearance: She is underweight. She is ill-appearing.   HENT:      Head: Normocephalic and atraumatic.      Right Ear: Decreased hearing noted.      Left Ear: Decreased hearing noted.   Eyes:      General: Lids are normal.   Neck:      Vascular: No JVD.      Trachea: Trachea normal.   Cardiovascular:      Rate and Rhythm: Normal rate.      Heart sounds: Normal heart sounds.   Pulmonary:      Effort: Pulmonary effort is normal.   Chest:      Chest wall: No deformity or swelling.   Abdominal:      General: There is no distension.      Palpations: Abdomen is soft.   Musculoskeletal:      Cervical back: Neck supple. "   Skin:     General: Skin is warm and dry.   Neurological:      Mental Status: She is alert. She is disoriented.   Psychiatric:         Behavior: Behavior is cooperative.         Cognition and Memory: Cognition is impaired.     Functional status: Palliative Performance Scale Score: Performance 30% based on the following measures: Ambulation: Totally bed bound, Activity and Evidence of Disease: Unable to do any work, extensive evidence of disease, Self-Care: Total care required,  Intake: Reduced, LOC: Full, drowsy or confusion.  Nutritional status: Albumin 2.7. Body mass index is 15.45 kg/m².   Patient status: Disease state: Deteriorating despite treatments.    Impression/Problem List:  1. Dementia / Chronic cerebral microvascular disease - Per CT of head 2018  2. Cognitive deficiency syndrome - Per guardian   3. Severe malnutrition  4. Acute kidney injury  5. Small bowel obstruction - Resolved  6. Hypernatremia - Resolved   7. Pneumonia of LLL   8. Pleural effusion, left  9. Essential hypertension  10. Advanced age  11. Chronic combined systolic and diastolic CHF - Per echocardiogram 2018   12. Impaired hearing       Plan / Recommendations     1. Palliative Care Encounter   - Goals of care include CODE STATUS CPR with full support interventions.    - Prognosis is guarded long-term secondary to dementia/chronic remark vascular disease, LETITIA, severe malnutrition, pneumonia of LLL, pleural effusion, advanced age, SBO and other comorbidities listed above.    - Do not recommend undergoing dialysis or escalation in the care with aggressive life sustaining measures as this would not lead to overall improvement in her quality of life and could potentially result in undue prolong discomfort and suffering.    - Documentation has been completed per attending in regards to change of CODE STATUS/level of interventions. Discussed with attending.      - Completed documentation has been sent to patient's guardian, Margarito Steel,  this morning.  Await for decision regarding recommendations    ADDENDUM:  Margarito Mcrae, reports he has received documentation however the  is not in office today.  Reports he will submit documentation for his review at his earliest opportunity and update as soon as possible.  Notified attending of response.     Thank you for allowing us to participate in patient's plan of care. Palliative Care Team will continue to follow patient.     Time spent:35 minutes spent reviewing medical and medication records, assessing and examining patient, discussing with family, answering questions, providing some guidance about a plan and documentation of care, and coordinating care with other healthcare members, with > 50% time spent face to face.   Electronically signed by, JAMIR Beaver, 01/13/23.

## 2023-01-13 NOTE — PLAN OF CARE
Problem: Adult Inpatient Plan of Care  Goal: Plan of Care Review  Outcome: Ongoing, Progressing  Flowsheets (Taken 1/13/2023 0830)  Progress: no change  Plan of Care Reviewed With: (RN, Joaquín) other (see comments)  Outcome Evaluation: SLP dysphagia tx completed this AM. Pt resting upon SLP arrival, alerted to tactile cue. Pt continued to require max verbal cues with multiple repetitions of words and short phrases for communicative efficiency. Again, attempted written communication in single word form, was functional in thick, large print utilizing marker. She requested water, only agreed to 30 degree head of bed elevation despite cues and encouragement. Pt was presented three thin liquid water trials via straw, which she consumed with mildly audible swallow yet no overt s/s of aspiration. She was offered all food items from meal tray with continued reinforcements. She initially refused all mech soft trials and only agreed to nectar thick Boost, which she self fed with intermittent assistance given visual impairment. She consumed roughly 12 oz of nectar thick boost with mildly congested breath sounds, assessed subjectively, on occasion. She later agreed to mech soft [place] toast trials and occassionally requested these trials. She exhibited lateralized mastication at times, with reduced mandibular strength, though functional lingual manipulation. She was observed to have a 1x cough, with consideration that trial consumed had been cut into a small bite size per SLP prior to presentation. Cannot fully r/o aspiration at this time, yet feel pt may remain on current diet of mech soft with nectar thick liquids with continued bedside monitoring. If increased lung congestion is observed or increased concerns with diet toleration, objective testing may be considered, yet SLP is guarded that pt would appropriately participate given lack of interest in most offered PO options at bedside and limited intake. 1:1 feedings with  staff, chop bites into small sizes prior to presentation to pt. Meds whole with nectar thick. ST to continue to monitor. Thanks!

## 2023-01-13 NOTE — PLAN OF CARE
Problem: Adult Inpatient Plan of Care  Goal: Plan of Care Review  Outcome: Ongoing, Progressing  Flowsheets (Taken 1/12/2023 1821)  Progress: no change  Plan of Care Reviewed With: patient  Outcome Evaluation: Pt denies pain. VSS. S  PAC BBB PVC. Diet upgraded by SLP. Pt now enjoys drinking Boosts. Has drank a total of 5 today. Awaiting palliative orders if documentation is provided of necessity.

## 2023-01-13 NOTE — PROGRESS NOTES
Nephrology ([facility]) Progress Note      Patient:  Susana Delcid  YOB: 1940  Date of Service: 1/13/2023  MRN: [id number]   Acct: [id number]   Primary Care Physician: Provider, No Known  Advance Directive:   Code Status and Medical Interventions:   Ordered at: 01/05/23 7669     Level Of Support Discussed With:    Patient     Code Status (Patient has no pulse and is not breathing):    CPR (Attempt to Resuscitate)     Medical Interventions (Patient has pulse or is breathing):    Full Support     Admit Date: 1/5/2023       Hospital Day: 8  Referring Provider: Dony Samuels,       Patient personally seen and examined.  Complete chart including Consults, Notes, Operative Reports, Labs, Cardiology, and Radiology studies reviewed as able.    Chief complaint: Abnormal labs.    Subjective:  Susana Delcid is a 82 y.o. female for whom we were consulted for evaluation and treatment of acute kidney injury, hypernatremia, hypokalemia.  History of dementia.  Resides at [facility].  Brought to ER with nausea and vomiting on 1/05.  Imaging revealed small bowel obstruction and pneumonia. Creatinine was 3.4. Hospital course remarkable for some improvement of renal function and for some fluctuation of sodium and potassium levels. Sodium improving with change to hypotonic IV fluids.    Today she is more alert and awake.  She was able to answer some question.  She has no improvement of renal function.  However she has good urine output.    Allergies:  Aspirin, Codeine, and Motrin [ibuprofen]    Home Meds:  Medications Prior to Admission   Medication Sig Dispense Refill Last Dose   • acetaminophen (TYLENOL) 325 MG tablet Take 650 mg by mouth Every 6 (Six) Hours As Needed for Mild Pain .   Past Week   • ARIPiprazole (ABILIFY) 5 MG tablet Take 5 mg by mouth Daily.   1/5/2023   • carvedilol (COREG) 12.5 MG tablet Take 12.5 mg by mouth 2 (Two) Times a Day With Meals.   1/5/2023   •  cholecalciferol (VITAMIN D3) 1.25 MG (13416 UT) capsule Take 50,000 Units by mouth 1 (One) Time Per Week. One time a day every Tue for supplement   Past Week   • cloNIDine (CATAPRES) 0.1 MG tablet Take 0.1 mg by mouth Daily As Needed for High Blood Pressure. 1 tablet every 24 hours as needed for high BP for SBP greater than 180, DBP greater than 90   Past Month   • furosemide (LASIX) 40 MG tablet Take 40 mg by mouth Daily.   1/5/2023   • hydrALAZINE (APRESOLINE) 50 MG tablet Take 1 tablet by mouth Every 8 (Eight) Hours.   1/5/2023   • isosorbide dinitrate (ISORDIL) 20 MG tablet Take 1 tablet by mouth Every 8 (Eight) Hours.   1/5/2023   • potassium chloride (MICRO-K) 10 MEQ CR capsule Take 4 capsules by mouth Daily.   1/5/2023   • PARoxetine (PAXIL) 10 MG tablet Take 10 mg by mouth Every Morning.   Unknown       Medicines:  Current Facility-Administered Medications   Medication Dose Route Frequency Provider Last Rate Last Admin   • acetaminophen (TYLENOL) tablet 650 mg  650 mg Oral Q4H PRN Dony Samuels DO       • albuterol (PROVENTIL) nebulizer solution 0.5% 2.5 mg/0.5mL  1.25 mg Nebulization 4x Daily - RT Dony Samuels DO   1.25 mg at 01/13/23 1029    And   • ipratropium (ATROVENT) nebulizer solution 0.5 mg  0.5 mg Nebulization 4x Daily - RT Dony Samuels DO   0.5 mg at 01/13/23 1029   • carvedilol (COREG) tablet 12.5 mg  12.5 mg Oral BID With Meals Seamus Ruvalcaba APRN   12.5 mg at 01/13/23 0846   • dextrose (D5W) 5 % infusion  75 mL/hr Intravenous Continuous Tayo Napoles MD 75 mL/hr at 01/13/23 0244 75 mL/hr at 01/13/23 0244   • ondansetron (ZOFRAN) injection 4 mg  4 mg Intravenous Q6H PRN Dony Samuels DO       • silver sulfadiazine (SILVADENE, SSD) 1 % cream   Topical Q12H PRN Dony Samuels DO       • sodium chloride 0.9 % flush 10 mL  10 mL Intravenous Q12H Dony Samuels DO   10 mL at 01/13/23 0846   • sodium chloride 0.9 % flush 10 mL  10 mL Intravenous PRN Arvind, Ali  DO Marissa       • sodium chloride 0.9 % infusion 40 mL  40 mL Intravenous Dony Saavedra DO       • sodium chloride nasal spray 2 spray  2 spray Each Nare PRN Claudio Cobb MD   2 spray at 01/08/23 1634       Past Medical History:  Past Medical History:   Diagnosis Date   • Hyperlipidemia    • Hypertension        Past Surgical History:  Past Surgical History:   Procedure Laterality Date   • APPENDECTOMY     • HYSTERECTOMY     • TONSILLECTOMY     • WRIST SURGERY Right        Family History  History reviewed. No pertinent family history.    Social History  Social History     Socioeconomic History   • Marital status:    Tobacco Use   • Smoking status: Never   • Smokeless tobacco: Never   Substance and Sexual Activity   • Alcohol use: No   • Drug use: No   • Sexual activity: Defer       Review of Systems:  History obtained from chart review and the patient  General ROS: No fever or chills  Respiratory ROS: No cough, shortness of breath, wheezing  Cardiovascular ROS: No chest pain or palpitations  Gastrointestinal ROS: No abdominal pain or melena  Genito-Urinary ROS: No dysuria or hematuria  Psych ROS: No anxiety and depression  14 point ROS reviewed with the patient and negative except as noted above and in the HPI unless unable to obtain.    Objective:  Patient Vitals for the past 24 hrs:   BP Temp Temp src Pulse Resp SpO2 Weight   01/13/23 1138 128/72 98.7 °F (37.1 °C) Oral 70 18 94 % --   01/13/23 1033 -- -- -- 78 18 95 % --   01/13/23 1029 -- -- -- 83 18 95 % --   01/13/23 0700 133/69 97.9 °F (36.6 °C) Oral 73 16 91 % --   01/13/23 0656 -- -- -- 80 16 94 % --   01/13/23 0650 -- -- -- 80 16 94 % --   01/13/23 0351 115/57 97.8 °F (36.6 °C) Oral 77 18 96 % 47.4 kg (104 lb 9.6 oz)   01/12/23 2351 95/50 97.2 °F (36.2 °C) Oral 82 16 95 % --   01/12/23 1949 -- -- -- 80 20 95 % --   01/12/23 1946 101/51 97 °F (36.1 °C) Oral 77 16 95 % --   01/12/23 1936 -- -- -- 81 20 95 % --   01/12/23 1559 105/56 97.4  °F (36.3 °C) Oral 75 16 95 % --   01/12/23 1443 -- -- -- 78 16 94 % --       Intake/Output Summary (Last 24 hours) at 1/13/2023 1231  Last data filed at 1/13/2023 0800  Gross per 24 hour   Intake 1440 ml   Output --   Net 1440 ml     General: awake, oriented X 1   HEENT: Normocephalic atraumatic head  Chest:  clear to auscultation bilaterally without respiratory distress  CVS: regular rate and rhythm  Abdominal: soft, nontender, positive bowel sounds  Extremities: no cyanosis or edema  Skin: warm and dry without rash      Labs:  Results from last 7 days   Lab Units 01/12/23  0754 01/11/23  1330 01/11/23  0504   WBC 10*3/mm3 10.48 10.38 12.21*   HEMOGLOBIN g/dL 11.8* 11.6* 12.3   HEMATOCRIT % 37.4 39.1 43.1   PLATELETS 10*3/mm3 250 247 225         Results from last 7 days   Lab Units 01/13/23  1049 01/12/23  1752 01/12/23  1239 01/11/23  1330 01/11/23  0504 01/10/23  1232 01/10/23  0456   SODIUM mmol/L 135* 137 140   < > 149*   < > 153*   POTASSIUM mmol/L 3.5 3.6 3.8   < > 3.8   < > 3.7   CHLORIDE mmol/L 90* 90* 93*   < > 96*   < > 101   CO2 mmol/L 36.0* 35.0* 37.0*   < > 38.0*   < > 46.0*   BUN mg/dL 85* 78* 74*   < > 69*   < > 60*   CREATININE mg/dL 4.12* 4.29* 4.32*   < > 3.89*   < > 2.49*   CALCIUM mg/dL 7.8* 8.5* 8.0*   < > 8.7   < > 9.5   EGFR mL/min/1.73 10.4* 9.9* 9.8*   < > 11.1*   < > 19.0*   BILIRUBIN mg/dL 0.2  --   --   --  0.3  --  0.2   ALK PHOS U/L 102  --   --   --  94  --  85   ALT (SGPT) U/L 24  --   --   --  39*  --  20   AST (SGOT) U/L 21  --   --   --  40*  --  28   GLUCOSE mg/dL 154* 189* 223*   < > 103*   < > 175*    < > = values in this interval not displayed.       Radiology:   Imaging Results (Last 72 Hours)     Procedure Component Value Units Date/Time    US Renal Bilateral [[id number]] Collected: 01/10/23 1430     Updated: 01/10/23 1436    Narrative:      HISTORY: Acute kidney injury     Renal ultrasound: Sonographic imaging of the kidneys and bladder is  performed.     COMPARISON: CT  abdomen pelvis on 01/05/2023     FINDINGS: The kidneys appear isoechoic compared to the liver. Right  kidney measures 8.7 x 5.0 x 4.8 cm. There is a 1.7 cm inferior right  renal cyst with few internal echoes supporting a proteinaceous cyst. The  left kidney measures 9.6 x 5.2 x 4.9 cm. There is a 2.6 cm superior  right renal cyst with a 3.7 cm mid lateral left renal cyst. No  convincing solid renal mass. No hydronephrosis. No obstructing  intrarenal stones. No abnormal perinephric fluid collection.     Bladder appears normal. Visible abdominal aorta is normal in caliber.  Proximal IVC is patent.       Impression:      1. Simple appearing left renal cysts with a 1.7 cm  proteinaceous/hemorrhagic right renal cyst. No convincing solid renal  mass. No hydronephrosis.  This report was finalized on 01/10/2023 14:33 by Dr. Mindy Park MD.          Culture:  Blood Culture   Date Value Ref Range Status   01/05/2023 No growth at 4 days  Preliminary   01/05/2023 No growth at 4 days  Preliminary         Assessment   1.  Acute kidney injury/stage III.  2.  Acute tubular necrosis.  3.  Hypernatremia resolved.  4.  Small bowel obstruction  5.  LLL pneumonia  6.  Combined systolic/diastolic CHF    Plan:  1.  Change IV fluid composition  2.  Continue IV antibiotics.  3.  Monitor renal function closely.  4.  Hemodialysis not recommended due to advanced dementia and poor quality of life.        1/13/2023  12:31 CST

## 2023-01-13 NOTE — PROGRESS NOTES
We are filling out a form to send to her guardian in regards for change of code status/level of interventions.  We have attached a separate note to detail why we feel these recommendations are appropriate.  For completeness of the medical record and such that the letter is not lost, I am copying and pasting the letter below:        To whom it may concern:    This week, I have had the honor of caring for Mrs. Susana Delcid.  She has advanced dementia.  She is unable to perform basic tasks to care for herself.  She is not eating enough to maintain her nutritional status.  Per the Registered Dietician, she currently meets criteria for severe malnutrition.  She is very underweight with a BMI of 15.31.  At some point, if the goal were to continue to prolong life, she would require a feeding tube and tube feeding.    She has developed acute renal failure.  She is to the point of requiring hemodialysis to perform the regular functions of her kidneys.  This includes balancing her body’s fluid status, removing toxins from her body, and regulating her electrolytes.  Without dialysis, she will likely pass at some point over the next few weeks to months.    However, I do not feel dialysis is beneficial to her overall situation.  As stated above, she has no good quality of life with her advanced dementia.  If she were to start dialysis, she would need to go to dialysis 3 times a week.  Constant moves and changes of environment can be very unsettling and agitating for dementia patients.  Even with dialysis, she will likely continue to decline due to her poor nutritional status.  She will still likely at some point require a feeding tube to maintain life.  The Nephrology team is in agreement that dialysis will not help her overall clinical picture.    At this time, I would recommend the followin.  Change in code status to DNR (Do Not Resuscitate), with limited interventions to include comfort measures only    2. NOT initiating  Hemodialysis.      Respectfully,       Charles Gunn MD

## 2023-01-13 NOTE — THERAPY TREATMENT NOTE
Acute Care - Speech Language Pathology   Swallow Treatment Note [facility]     Patient Name: Susana Delcid  : 1940  MRN: [id number]  Today's Date: 2023               Admit Date: 2023     SLP dysphagia tx completed this AM. Pt resting upon SLP arrival, alerted to tactile cue. Pt continued to require max verbal cues with multiple repetitions of words and short phrases for communicative efficiency. Again, attempted written communication in single word form, was functional in thick, large print utilizing marker. She requested water, only agreed to 30 degree head of bed elevation despite cues and encouragement. Pt was presented three thin liquid water trials via straw, which she consumed with mildly audible swallow yet no overt s/s of aspiration. She was offered all food items from meal tray with continued reinforcements. She initially refused all mech soft trials and only agreed to nectar thick Boost, which she self fed with intermittent assistance given visual impairment. She consumed roughly 12 oz of nectar thick boost with mildly congested breath sounds, assessed subjectively, on occasion. She later agreed to mech soft [place] toast trials and occassionally requested these trials. She exhibited lateralized mastication at times, with reduced mandibular strength, though functional lingual manipulation. She was observed to have a 1x cough, with consideration that trial consumed had been cut into a small bite size per SLP prior to presentation. Cannot fully r/o aspiration at this time, yet feel pt may remain on current diet of mech soft with nectar thick liquids with continued bedside monitoring. If increased lung congestion is observed or increased concerns with diet toleration, objective testing may be considered, yet SLP is guarded that pt would appropriately participate given lack of interest in most offered PO options at bedside and limited intake. 1:1 feedings with staff, chop bites into small sizes  prior to presentation to pt. Meds whole with nectar thick. ST to continue to monitor. Thanks! Fatmata Lino, CCC-SLP 1/13/2023 09:18 CST    Visit Dx:     ICD-10-CM ICD-9-CM   1. Small bowel obstruction (HCC)  K56.609 560.9   2. Decreased activities of daily living (ADL)  Z78.9 V49.89   3. Impaired mobility  Z74.09 799.89   4. Dysphagia, unspecified type  R13.10 787.20     Patient Active Problem List   Diagnosis   • Acute systolic congestive heart failure (HCC)   • Elevated troponin level   • Iron deficiency anemia   • Tachycardia   • Elevated d-dimer   • UTI (urinary tract infection)   • Essential hypertension   • Mixed hyperlipidemia   • Pleural effusion, bilateral   • SBO (small bowel obstruction) (HCC)   • LETITIA (acute kidney injury) (HCC)   • Stage 3b chronic kidney disease (CKD) (HCC)   • Left lower lobe pneumonia   • Hyperkalemia   • Chronic combined systolic (congestive) and diastolic (congestive) heart failure (HCC)   • Acute respiratory failure with hypoxia (HCC)   • Unspecified bacterial pneumonia   • Acute on chronic combined systolic and diastolic CHF (congestive heart failure) (HCC)     Past Medical History:   Diagnosis Date   • Hyperlipidemia    • Hypertension      Past Surgical History:   Procedure Laterality Date   • APPENDECTOMY     • HYSTERECTOMY     • TONSILLECTOMY     • WRIST SURGERY Right        SLP Recommendation and Plan                                                                            Plan of Care Reviewed With: other (see comments) (RN, Joaquín)  Progress: no change  Outcome Evaluation: SLP dysphagia tx completed this AM. Pt resting upon SLP arrival, alerted to tactile cue. Pt continued to require max verbal cues with multiple repetitions of words and short phrases for communicative efficiency. Again, attempted written communication in single word form, was functional in thick, large print utilizing marker. She requested water, only agreed to 30 degree head of bed elevation despite  cues and encouragement. Pt was presented three thin liquid water trials via straw, which she consumed with mildly audible swallow yet no overt s/s of aspiration. She was offered all food items from meal tray with continued reinforcements. She initially refused all mech soft trials and only agreed to nectar thick Boost, which she self fed with intermittent assistance given visual impairment. She consumed roughly 12 oz of nectar thick boost with mildly congested breath sounds, assessed subjectively, on occasion. She later agreed to mech soft [place] toast trials and occassionally requested these trials. She exhibited lateralized mastication at times, with reduced mandibular strength, though functional lingual manipulation. She was observed to have a 1x cough, with consideration that trial consumed had been cut into a small bite size per SLP prior to presentation. Cannot fully r/o aspiration at this time, yet feel pt may remain on current diet of mech soft with nectar thick liquids with continued bedside monitoring. If increased lung congestion is observed or increased concerns with diet toleration, objective testing may be considered, yet SLP is guarded that pt would appropriately participate given lack of interest in most offered PO options at bedside and limited intake. 1:1 feedings with staff, chop bites into small sizes prior to presentation to pt. Meds whole with nectar thick. ST to continue to monitor. Thanks!      SWALLOW EVALUATION (last 72 hours)     SLP Adult Swallow Evaluation     Row Name 01/13/23 0830 01/12/23 3758                Rehab Evaluation    Document Type therapy note (daily note)  -TM therapy note (daily note)  -TM       Subjective Information no complaints  -TM --  Significantly hearing impaired  -       Patient Observations alert;cooperative  Cooperative with max verbal cues and intermittent written cues  - alert  Cooperative with encouragement and reinforcements  -        Patient/Family/Caregiver Comments/Observations No family present.  -TM No family present.  -TM       Patient Effort fair  -TM fair  -TM          Pain    Additional Documentation Pain Scale: FACES Pre/Post-Treatment (Group)  -TM Pain Scale: FACES Pre/Post-Treatment (Group)  -TM          Pain Scale: FACES Pre/Post-Treatment    Pain: FACES Scale, Pretreatment 2-->hurts little bit  -TM 2-->hurts little bit  -TM       Posttreatment Pain Rating 2-->hurts little bit  -TM 2-->hurts little bit  -TM       Pre/Posttreatment Pain Comment Generalized  -TM --          (LTG) Swallow    (LTG) Swallow Pt will tolerate least restrictive diet with no overt s/s of aspiration.  -TM Pt will tolerate least restrictive diet with no overt s/s of aspiration.  -TM       [place] (Swallow Long Term Goal) with moderate cues (50-74% accuracy)  -TM with moderate cues (50-74% accuracy)  -TM       Time Frame (Swallow Long Term Goal) by discharge  -TM by discharge  -TM       Barriers (Swallow Long Term Goal) n/a  -TM n/a  -TM       Progress/Outcomes (Swallow Long Term Goal) continuing progress toward goal  -TM continuing progress toward goal  -TM          (STG) Patient will tolerate trials of    Consistencies Trialed (Tolerate trials) nectar/ mildly thick liquids  -TM nectar/ mildly thick liquids  -TM       Desired Outcome (Tolerate trials) without signs/symptoms of aspiration;with adequate oral prep/transit/clearance  -TM without signs/symptoms of aspiration;with adequate oral prep/transit/clearance  -TM       [place] (Tolerate trials) with moderate cues (50-74% accuracy)  -TM with moderate cues (50-74% accuracy)  -TM       Time Frame (Tolerate trials) by discharge  -TM by discharge  -TM       Progress/Outcomes (Tolerate trials) continuing progress toward goal  -TM continuing progress toward goal  -TM       Comment (Tolerate trials) -- n/a  -TM             User Key  (r) = Recorded By, (t) = Taken By, (c) = Cosigned By    Initials Name  Effective Dates     Fatmata Lino CCC-SLP 06/16/21 -                 EDUCATION  The patient has been educated in the following areas:   Dysphagia (Swallowing Impairment).        SLP GOALS     Row Name 01/13/23 0830 01/12/23 1335          (LTG) Swallow    (LTG) Swallow Pt will tolerate least restrictive diet with no overt s/s of aspiration.  -TM Pt will tolerate least restrictive diet with no overt s/s of aspiration.  -TM     [place] (Swallow Long Term Goal) with moderate cues (50-74% accuracy)  -TM with moderate cues (50-74% accuracy)  -TM     Time Frame (Swallow Long Term Goal) by discharge  -TM by discharge  -TM     Barriers (Swallow Long Term Goal) n/a  -TM n/a  -TM     Progress/Outcomes (Swallow Long Term Goal) continuing progress toward goal  -TM continuing progress toward goal  -TM        (STG) Patient will tolerate trials of    Consistencies Trialed (Tolerate trials) nectar/ mildly thick liquids  -TM nectar/ mildly thick liquids  -TM     Desired Outcome (Tolerate trials) without signs/symptoms of aspiration;with adequate oral prep/transit/clearance  -TM without signs/symptoms of aspiration;with adequate oral prep/transit/clearance  -TM     [place] (Tolerate trials) with moderate cues (50-74% accuracy)  -TM with moderate cues (50-74% accuracy)  -TM     Time Frame (Tolerate trials) by discharge  -TM by discharge  -TM     Progress/Outcomes (Tolerate trials) continuing progress toward goal  -TM continuing progress toward goal  -TM     Comment (Tolerate trials) -- n/a  -TM           User Key  (r) = Recorded By, (t) = Taken By, (c) = Cosigned By    Initials Name Provider Type     Fatmata Lino CCC-SLP Speech and Language Pathologist                   Time Calculation:    Time Calculation- SLP     Row Name 01/13/23 0917             Time Calculation- SLP    SLP Start Time 0830  -TM      SLP Stop Time 0915  -TM      SLP Time Calculation (min) 45 min  -TM      SLP Received On 01/13/23  -          Untimed Charges    07798-OG Treatment Swallow Minutes 45  -TM         Total Minutes    Untimed Charges Total Minutes 45  -TM       Total Minutes 45  -TM            User Key  (r) = Recorded By, (t) = Taken By, (c) = Cosigned By    Initials Name Provider Type    TM Fatmata Lino CCC-SLP Speech and Language Pathologist                Therapy Charges for Today     Code Description Service Date Service Provider Modifiers Qty    79939416783 HC ST TREATMENT SWALLOW 5 1/12/2023 Fatmata Lino CCC-SLP GN 1    25398400579 HC ST TREATMENT SWALLOW 3 1/13/2023 Fatmata Lino CCC-SLP GN 1               LORETTA Ogden  1/13/2023

## 2023-01-13 NOTE — PROGRESS NOTES
[facility] Medicine Services  INPATIENT PROGRESS NOTE    Patient Name: Susana Delcid  Date of Admission: 1/5/2023  Today's Date: 01/13/23  Length of Stay: 8  Primary Care Physician: Provider, No Known    Subjective   Chief Complaint: Bowel obstruction  Patient lying in bed on room air.  She is again drinking a strawberry Ensure today.  I asked her if she was in pain and she said no, she would like some more toast.  I asked her if she needed anything and she did not respond.  She appears slightly improved from an alertness standpoint.    ROS:  Unable to do obtain due to dementia and altered mental status.  Otherwise as stated above.  All pertinent negatives and positives are as above. All other systems have been reviewed and are negative unless otherwise stated.     Objective    Temp:  [97 °F (36.1 °C)-97.9 °F (36.6 °C)] 97.9 °F (36.6 °C)  Heart Rate:  [73-86] 73  Resp:  [16-20] 16  BP: ()/(50-75) 133/69  Physical Exam  Vitals and nursing note reviewed.   Constitutional:       Comments: Cachectic.  Chronically ill.  Advanced age.   HENT:      Head: Normocephalic and atraumatic.   Eyes:      Conjunctiva/sclera: Conjunctivae normal.      Pupils: Pupils are equal, round, and reactive to light.   Neck:      Vascular: No JVD.   Cardiovascular:      Rate and Rhythm: Normal rate and regular rhythm.      Heart sounds: Normal heart sounds.   Pulmonary:      Effort: No respiratory distress.      Breath sounds: No wheezing or rales.      Comments: Diminished breath sound bilateral, clear, on room air  Chest:      Chest wall: No tenderness.   Abdominal:      General: Bowel sounds are normal. There is no distension.      Palpations: Abdomen is soft.      Tenderness: There is no abdominal tenderness.   Musculoskeletal:         General: No tenderness or deformity.      Cervical back: Neck supple.   Skin:     General: Skin is warm and dry.      Capillary Refill: Capillary refill takes 2 to 3  seconds.      Findings: No rash.   Neurological:      Cranial Nerves: No cranial nerve deficit.      Motor: Weakness present. No abnormal muscle tone.      Coordination: Coordination abnormal.      Gait: Gait abnormal.      Deep Tendon Reflexes: Reflexes normal.           Results Review:  I have reviewed the labs, radiology results, and diagnostic studies.    Laboratory Data:   Results from last 7 days   Lab Units 01/12/23  0754 01/11/23  1330 01/11/23  0504   WBC 10*3/mm3 10.48 10.38 12.21*   HEMOGLOBIN g/dL 11.8* 11.6* 12.3   HEMATOCRIT % 37.4 39.1 43.1   PLATELETS 10*3/mm3 250 247 225        Results from last 7 days   Lab Units 01/12/23  1752 01/12/23  1239 01/12/23  0754 01/11/23  1330 01/11/23  0504 01/10/23  1232 01/10/23  0456 01/08/23  1228 01/08/23  0323   SODIUM mmol/L 137 140 140   < > 149*   < > 153*   < > 160*   POTASSIUM mmol/L 3.6 3.8 3.7   < > 3.8   < > 3.7   < > 3.1*   CHLORIDE mmol/L 90* 93* 93*   < > 96*   < > 101   < > 105   CO2 mmol/L 35.0* 37.0* 37.0*   < > 38.0*   < > 46.0*   < > 44.0*   BUN mg/dL 78* 74* 74*   < > 69*   < > 60*   < > 64*   CREATININE mg/dL 4.29* 4.32* 4.25*   < > 3.89*   < > 2.49*   < > 1.87*   CALCIUM mg/dL 8.5* 8.0* 8.4*   < > 8.7   < > 9.5   < > 9.2   BILIRUBIN mg/dL  --   --   --   --  0.3  --  0.2  --  0.2   ALK PHOS U/L  --   --   --   --  94  --  85  --  68   ALT (SGPT) U/L  --   --   --   --  39*  --  20  --  12   AST (SGOT) U/L  --   --   --   --  40*  --  28  --  14   GLUCOSE mg/dL 189* 223* 111*   < > 103*   < > 175*   < > 154*    < > = values in this interval not displayed.       Culture Data:   Blood Culture   Date Value Ref Range Status   01/05/2023 No growth at 2 days  Preliminary   01/05/2023 No growth at 2 days  Preliminary       I have reviewed the patient's current medications.     Assessment/Plan   Assessment  Active Hospital Problems    Diagnosis    • **SBO (small bowel obstruction) (HCC)    • Unspecified bacterial pneumonia    • Acute on chronic combined  systolic and diastolic CHF (congestive heart failure) (HCC)    • Acute respiratory failure with hypoxia (HCC)    • LETITIA (acute kidney injury) (HCC)    • Stage 3b chronic kidney disease (CKD) (HCC)    • Hyperkalemia    • Essential hypertension        Treatment Plan  Patient is a 82-year-old female who [facility] to [facility] emergency department [facility].  Patient has history of dementia, hyperlipidemia, hypertension.  This list is not at all exclusive as full history is limited at this time with patient being a poor historian. She presents with chief complaint of nausea and vomiting, the patient has abdominal distention, NG tube placed in emergency department.  CT scan abdomen shows small bowel obstruction.  Patient initially tachycardic and hypoxic requiring Vapotherm nasal cannula supplementation.  Creatinine on presentation 3.4, creatinine of 1.35 and September 2021.  GFR on presentation was 13, GFR in September 2021 was 38.     Small bowel obstruction-   NG tube initially placed in stomach and hooked to low intermittent suction. Patient pulled NG tube out.  Plan to reinstate NG tube.  Dr. Gabriel with general surgery consulted.Small bowel follow-through conducted 1/9/2023.  Bowel obstruction resolved.  General surgery recommended advancing diet with discontinuation of NG tube.  SLP evaluated.  Currently on full liquid diet with thin liquids.  General surgery signing off unless otherwise needed.  Full liquid diet tolerated for now.     Acute kidney injury superimposed on stage 3b chronic kidney disease-  Creatinine 3.4, BUN 83, GFR 13.1 on arrival.  These values are worsened from baseline of stage IIIb chronic kidney disease. Creatinine 1.35 and GFR 38 in September 2021.  Nephrology consulted.  Diuretic initiated 1/9/2023 in the presence of congestive  heart failure and worsening respiratory status and discontinued 1/10/2023 due to slight increase in creatinine.  Diuresis was  successful and allowed for improved respiratory status.  Renal ultrasound obtained.  BMP from last evening shows creatinine 4.29, BUN 78, GFR 9.9.  These values are essentially flat compared to yesterday morning.  IV D5 at 75 mL/h per nephrology.  Serial BMPs ordered to continue.     Hypoxia-  Respiratory status worsened and required Vapotherm 60% on 1/9/2023.  Chest x-ray and diuresis at that time.  Patient on room air for a few days now.    Hypernatremia-  Sodium 137 today.  IV D5 at 75 mL an hour     Left lower lobe pneumonia-  WBC 10.48 on 1/12/2023.  Blood cultures x2 no growth at 5 days.  COVID-negative.  Repeat chest x-ray 1/9/2023.  Patient currently on room air with O2 saturation 93%. Azithromycin and Rocephin IV continued.  CBC in a.m.     Chronic combined systolic and diastolic heart failure/hypertension-  Echocardiogram from January 2018 shows EF 31 to 35% with diastolic dysfunction.  We will be mindful of this with IV hydration.  We will hold home Lasix for now.   Patient diuresed on 1/9/2023 for worsened respiratory status.  Diuretic discontinued the morning of 1/10/2023 for increased creatinine.  Diuresis was successful.  Patient on room air.  IV metoprolol transitioned to home Coreg on 1/10/2023 as patient as patient was cleared by SLP for p.o. intake.     Hypokalemia  Potassium 3.6.  Nephrology following.    Palliative care consulted and closely following and communicating with healthcare surrogate, Margarito Damián.     Patient is a marquez of the [facility] and a full code.      Medical Decision Making  Number and Complexity of problems: 3 high complexity problems in acute kidney injury and small bowel obstruction and hypoxia.  3 moderate complexity problems in pneumonia and chronic combined systolic and diastolic heart failure and hypokalemia and hypernatremia  Differential Diagnosis: None     Conditions and Status  Small bowel obstruction resolved  Acute kidney injury  severe but stable  Left lower lobe  pneumonia stable  Chronic combined systolic and diastolic heart failure  stable  Hypokalemia resolved.    Hypernatremia resolved currently  Hypoxia improved     MDM Data  External documents reviewed: Echocardiogram from 2018  Cardiac tracing (EKG, telemetry) interpretation: Telemetry shows trigeminy  Radiology interpretation: Reviewed radiologist interpretation of chest x-ray/KUB  Labs reviewed: Arterial blood gas, CMP, CBC with differential, urinalysis  Any tests that were considered but not ordered: Echocardiogram     Decision rules/scores evaluated (example [id number], [place], etc): None     Discussed with: Patient, although I do not believe she understands.     Care Planning  Shared decision making: Discussed plan of care with patient in the presence of cognitive disability.  Code status and discussions: Full code, patient [place] a [place].     Disposition  Social Determinants of Health that impact treatment or disposition: Patient resides at a skilled nursing facility  Discharge disposition unable to determine at this time due to worsening renal function.    Electronically signed by JAMIR Worthy, 01/13/23, 09:45 CST.

## 2023-01-13 NOTE — PLAN OF CARE
Goal Outcome Evaluation:           Progress: no change  Outcome Evaluation: Patient sleeping most of shift. IV fluids currently infusing. Oriented at start of shift. VSS, safety maintained. NSR 72-85 on tele.

## 2023-01-13 NOTE — THERAPY DISCHARGE NOTE
Acute Care - Occupational Therapy Discharge Summary  [facility]     Patient Name: Susana Delcid  : 1940  MRN: [id number]    Today's Date: 2023                 Admit Date: 2023        OT Recommendation and Plan    Visit Dx:    ICD-10-CM ICD-9-CM   1. Small bowel obstruction (HCC)  K56.609 560.9   2. Decreased activities of daily living (ADL)  Z78.9 V49.89   3. Impaired mobility  Z74.09 799.89   4. Dysphagia, unspecified type  R13.10 787.20                OT Rehab Goals     Row Name 23 0800             Dressing Goal 1 (OT)    Activity/Device (Dressing Goal 1, OT) upper body dressing  -LS      [place]/Cues Needed (Dressing Goal 1, OT) minimum assist (75% or more patient effort)  -LS      Time Frame (Dressing Goal 1, OT) long term goal (LTG);by discharge  -LS      Progress/Outcome (Dressing Goal 1, OT) goal not met  -LS         Toileting Goal 1 (OT)    Activity/Device (Toileting Goal 1, OT) toileting skills, all  -LS      [place] Level/Cues Needed (Toileting Goal 1, OT) moderate assist (50-74% patient effort)  -LS      Time Frame (Toileting Goal 1, OT) long term goal (LTG);by discharge  -LS      Progress/Outcome (Toileting Goal 1, OT) goal not met  -LS         Self-Feeding Goal 1 (OT)    Activity/Device (Self-Feeding Goal 1, OT) self-feeding skills, all  -LS      [place] Level/Cues Needed (Self-Feeding Goal 1, OT) minimum assist (75% or more patient effort)  -LS      Time Frame (Self-Feeding Goal 1, OT) long term goal (LTG);by discharge  -LS      Progress/Outcomes (Self-Feeding Goal 1, OT) goal not met  -LS            User Key  (r) = Recorded By, (t) = Taken By, (c) = Cosigned By    Initials Name Provider Type Discipline    Lo Payne COTA Occupational Therapist Assistant THERAPIES                            OT Discharge Summary  Anticipated Discharge Disposition (OT): skilled nursing facility  Reason for Discharge: At baseline function, Unable to participate  Outcomes Achieved:  Refer to plan of care for updates on goals achieved  Discharge Destination: SNF      KWABENA Faulkner  1/13/2023

## 2023-01-14 PROBLEM — E43 SEVERE MALNUTRITION (HCC): Status: ACTIVE | Noted: 2023-01-14

## 2023-01-14 LAB
ALBUMIN SERPL-MCNC: 2.7 G/DL (ref 3.5–5.2)
ALBUMIN/GLOB SERPL: 0.9 G/DL
ALP SERPL-CCNC: 115 U/L (ref 39–117)
ALT SERPL W P-5'-P-CCNC: 38 U/L (ref 1–33)
ANION GAP SERPL CALCULATED.3IONS-SCNC: 13 MMOL/L (ref 5–15)
AST SERPL-CCNC: 41 U/L (ref 1–32)
BASOPHILS # BLD AUTO: 0.03 10*3/MM3 (ref 0–0.2)
BASOPHILS NFR BLD AUTO: 0.3 % (ref 0–1.5)
BILIRUB SERPL-MCNC: 0.2 MG/DL (ref 0–1.2)
BUN SERPL-MCNC: 75 MG/DL (ref 8–23)
BUN/CREAT SERPL: 22.9 (ref 7–25)
CALCIUM SPEC-SCNC: 8.5 MG/DL (ref 8.6–10.5)
CHLORIDE SERPL-SCNC: 95 MMOL/L (ref 98–107)
CO2 SERPL-SCNC: 31 MMOL/L (ref 22–29)
CREAT SERPL-MCNC: 3.28 MG/DL (ref 0.57–1)
DEPRECATED RDW RBC AUTO: 45.9 FL (ref 37–54)
EGFRCR SERPLBLD CKD-EPI 2021: 13.6 ML/MIN/1.73
EOSINOPHIL # BLD AUTO: 0.19 10*3/MM3 (ref 0–0.4)
EOSINOPHIL NFR BLD AUTO: 1.7 % (ref 0.3–6.2)
ERYTHROCYTE [DISTWIDTH] IN BLOOD BY AUTOMATED COUNT: 14.6 % (ref 12.3–15.4)
GLOBULIN UR ELPH-MCNC: 3.1 GM/DL
GLUCOSE SERPL-MCNC: 105 MG/DL (ref 65–99)
HCT VFR BLD AUTO: 36.3 % (ref 34–46.6)
HGB BLD-MCNC: 11.4 G/DL (ref 12–15.9)
IMM GRANULOCYTES # BLD AUTO: 0.22 10*3/MM3 (ref 0–0.05)
IMM GRANULOCYTES NFR BLD AUTO: 1.9 % (ref 0–0.5)
LYMPHOCYTES # BLD AUTO: 1.55 10*3/MM3 (ref 0.7–3.1)
LYMPHOCYTES NFR BLD AUTO: 13.5 % (ref 19.6–45.3)
MCH RBC QN AUTO: 27.1 PG (ref 26.6–33)
MCHC RBC AUTO-ENTMCNC: 31.4 G/DL (ref 31.5–35.7)
MCV RBC AUTO: 86.4 FL (ref 79–97)
MONOCYTES # BLD AUTO: 0.65 10*3/MM3 (ref 0.1–0.9)
MONOCYTES NFR BLD AUTO: 5.7 % (ref 5–12)
NEUTROPHILS NFR BLD AUTO: 76.9 % (ref 42.7–76)
NEUTROPHILS NFR BLD AUTO: 8.83 10*3/MM3 (ref 1.7–7)
NRBC BLD AUTO-RTO: 0 /100 WBC (ref 0–0.2)
PLATELET # BLD AUTO: 199 10*3/MM3 (ref 140–450)
PMV BLD AUTO: 12.2 FL (ref 6–12)
POTASSIUM SERPL-SCNC: 3.9 MMOL/L (ref 3.5–5.2)
PROT SERPL-MCNC: 5.8 G/DL (ref 6–8.5)
RBC # BLD AUTO: 4.2 10*6/MM3 (ref 3.77–5.28)
SODIUM SERPL-SCNC: 139 MMOL/L (ref 136–145)
WBC NRBC COR # BLD: 11.47 10*3/MM3 (ref 3.4–10.8)

## 2023-01-14 PROCEDURE — 94799 UNLISTED PULMONARY SVC/PX: CPT

## 2023-01-14 PROCEDURE — 80053 COMPREHEN METABOLIC PANEL: CPT | Performed by: NURSE PRACTITIONER

## 2023-01-14 PROCEDURE — 94761 N-INVAS EAR/PLS OXIMETRY MLT: CPT

## 2023-01-14 PROCEDURE — 85025 COMPLETE CBC W/AUTO DIFF WBC: CPT

## 2023-01-14 PROCEDURE — 94664 DEMO&/EVAL PT USE INHALER: CPT

## 2023-01-14 RX ORDER — LABETALOL HYDROCHLORIDE 5 MG/ML
10 INJECTION, SOLUTION INTRAVENOUS EVERY 6 HOURS PRN
Status: DISCONTINUED | OUTPATIENT
Start: 2023-01-14 | End: 2023-01-16 | Stop reason: HOSPADM

## 2023-01-14 RX ORDER — AMLODIPINE BESYLATE 5 MG/1
5 TABLET ORAL
Status: DISCONTINUED | OUTPATIENT
Start: 2023-01-14 | End: 2023-01-15

## 2023-01-14 RX ADMIN — SODIUM CHLORIDE 75 ML/HR: 9 INJECTION, SOLUTION INTRAVENOUS at 15:54

## 2023-01-14 RX ADMIN — IPRATROPIUM BROMIDE 0.5 MG: 0.5 SOLUTION RESPIRATORY (INHALATION) at 06:54

## 2023-01-14 RX ADMIN — LABETALOL HYDROCHLORIDE 10 MG: 5 INJECTION, SOLUTION INTRAVENOUS at 08:38

## 2023-01-14 RX ADMIN — CARVEDILOL 12.5 MG: 6.25 TABLET, FILM COATED ORAL at 17:12

## 2023-01-14 RX ADMIN — Medication 10 ML: at 21:02

## 2023-01-14 RX ADMIN — AMLODIPINE BESYLATE 5 MG: 5 TABLET ORAL at 09:47

## 2023-01-14 RX ADMIN — CARVEDILOL 12.5 MG: 6.25 TABLET, FILM COATED ORAL at 08:37

## 2023-01-14 RX ADMIN — ALBUTEROL SULFATE 1.25 MG: 2.5 SOLUTION RESPIRATORY (INHALATION) at 06:54

## 2023-01-14 NOTE — PROGRESS NOTES
Nephrology ([facility]) Progress Note      Patient:  Susana Delcid  YOB: 1940  Date of Service: 1/14/2023  MRN: [id number]   Acct: [id number]   Primary Care Physician: Provider, No Known  Advance Directive:   Code Status and Medical Interventions:   Ordered at: 01/05/23 2219     Level Of Support Discussed With:    Patient     Code Status (Patient has no pulse and is not breathing):    CPR (Attempt to Resuscitate)     Medical Interventions (Patient has pulse or is breathing):    Full Support     Admit Date: 1/5/2023       Hospital Day: 9  Referring Provider: Dony Samuels,       Patient personally seen and examined.  Complete chart including Consults, Notes, Operative Reports, Labs, Cardiology, and Radiology studies reviewed as able.    Chief complaint: Abnormal labs.    Subjective:  Susana Delcid is a 82 y.o. female for whom we were consulted for evaluation and treatment of acute kidney injury, hypernatremia, hypokalemia.  History of dementia.  Resides at [facility].  Brought to ER with nausea and vomiting on 1/05.  Imaging revealed small bowel obstruction and pneumonia. Creatinine was 3.4. Hospital course remarkable for some improvement of renal function and for some fluctuation of sodium and potassium levels. Sodium improving with change to hypotonic IV fluids.  She has recent significant decline in renal function which is now resolving.    This morning she is more alert and awake.  She has good urine output and her serum creatinine started improving    Allergies:  Aspirin, Codeine, and Motrin [ibuprofen]    Home Meds:  Medications Prior to Admission   Medication Sig Dispense Refill Last Dose   • acetaminophen (TYLENOL) 325 MG tablet Take 650 mg by mouth Every 6 (Six) Hours As Needed for Mild Pain .   Past Week   • ARIPiprazole (ABILIFY) 5 MG tablet Take 5 mg by mouth Daily.   1/5/2023   • carvedilol (COREG) 12.5 MG tablet Take 12.5 mg by mouth 2 (Two) Times a  Day With Meals.   1/5/2023   • cholecalciferol (VITAMIN D3) 1.25 MG (10775 UT) capsule Take 50,000 Units by mouth 1 (One) Time Per Week. One time a day every Tue for supplement   Past Week   • cloNIDine (CATAPRES) 0.1 MG tablet Take 0.1 mg by mouth Daily As Needed for High Blood Pressure. 1 tablet every 24 hours as needed for high BP for SBP greater than 180, DBP greater than 90   Past Month   • furosemide (LASIX) 40 MG tablet Take 40 mg by mouth Daily.   1/5/2023   • hydrALAZINE (APRESOLINE) 50 MG tablet Take 1 tablet by mouth Every 8 (Eight) Hours.   1/5/2023   • isosorbide dinitrate (ISORDIL) 20 MG tablet Take 1 tablet by mouth Every 8 (Eight) Hours.   1/5/2023   • potassium chloride (MICRO-K) 10 MEQ CR capsule Take 4 capsules by mouth Daily.   1/5/2023   • PARoxetine (PAXIL) 10 MG tablet Take 10 mg by mouth Every Morning.   Unknown       Medicines:  Current Facility-Administered Medications   Medication Dose Route Frequency Provider Last Rate Last Admin   • acetaminophen (TYLENOL) tablet 650 mg  650 mg Oral Q4H PRN Dony Samuels DO       • albuterol (PROVENTIL) nebulizer solution 0.5% 2.5 mg/0.5mL  1.25 mg Nebulization 4x Daily - RT Dony Samuels DO   1.25 mg at 01/14/23 0654    And   • ipratropium (ATROVENT) nebulizer solution 0.5 mg  0.5 mg Nebulization 4x Daily - RT Dony Samuels DO   0.5 mg at 01/14/23 0654   • amLODIPine (NORVASC) tablet 5 mg  5 mg Oral Q24H Parish Hope DO   5 mg at 01/14/23 0947   • carvedilol (COREG) tablet 12.5 mg  12.5 mg Oral BID With Meals Seamus Ruvalcaba APRN   12.5 mg at 01/14/23 0837   • labetalol (NORMODYNE,TRANDATE) injection 10 mg  10 mg Intravenous Q6H PRN Parish Hope DO   10 mg at 01/14/23 0838   • ondansetron (ZOFRAN) injection 4 mg  4 mg Intravenous Q6H PRN Dony Samuels,        • silver sulfadiazine (SILVADENE, SSD) 1 % cream   Topical Q12H PRN Dony Samuels,        • sodium chloride 0.9 % flush 10 mL  10 mL Intravenous Q12H Arvind,  Dony Ann DO   10 mL at 01/13/23 2050   • sodium chloride 0.9 % flush 10 mL  10 mL Intravenous PRN Dony Samuels DO       • sodium chloride 0.9 % infusion 40 mL  40 mL Intravenous PRN Dony Samuels DO       • sodium chloride 0.9 % infusion  75 mL/hr Intravenous Continuous Parish Hope DO 75 mL/hr at 01/14/23 0837 75 mL/hr at 01/14/23 0837   • sodium chloride nasal spray 2 spray  2 spray Each Nare Claudio Fuentes MD   2 spray at 01/08/23 1634       Past Medical History:  Past Medical History:   Diagnosis Date   • Hyperlipidemia    • Hypertension        Past Surgical History:  Past Surgical History:   Procedure Laterality Date   • APPENDECTOMY     • HYSTERECTOMY     • TONSILLECTOMY     • WRIST SURGERY Right        Family History  History reviewed. No pertinent family history.    Social History  Social History     Socioeconomic History   • Marital status:    Tobacco Use   • Smoking status: Never   • Smokeless tobacco: Never   Substance and Sexual Activity   • Alcohol use: No   • Drug use: No   • Sexual activity: Defer       Review of Systems:  History obtained from chart review and the patient  General ROS: No fever or chills  Respiratory ROS: No cough, shortness of breath, wheezing  Cardiovascular ROS: No chest pain or palpitations  Gastrointestinal ROS: No abdominal pain or melena  Genito-Urinary ROS: No dysuria or hematuria  Psych ROS: No anxiety and depression  14 point ROS reviewed with the patient and negative except as noted above and in the HPI unless unable to obtain.    Objective:  Patient Vitals for the past 24 hrs:   BP Temp Temp src Pulse Resp SpO2 Weight   01/14/23 1102 156/68 98.9 °F (37.2 °C) Oral 80 20 94 % --   01/14/23 0947 163/62 -- -- 82 -- -- --   01/14/23 0744 178/72 98 °F (36.7 °C) Oral 96 16 96 % --   01/14/23 0702 -- -- -- 89 16 100 % --   01/14/23 0654 -- -- -- 83 16 96 % --   01/14/23 0345 164/71 97.9 °F (36.6 °C) Oral 85 18 95 % --   01/13/23 2043 115/44 97.5  °F (36.4 °C) Axillary 68 18 97 % 53.8 kg (118 lb 9.6 oz)   01/13/23 1920 -- -- -- 76 18 97 % --   01/13/23 1632 122/68 98.9 °F (37.2 °C) Axillary 87 18 98 % --   01/13/23 1422 -- -- -- 80 18 100 % --   01/13/23 1416 -- -- -- 75 18 95 % --       Intake/Output Summary (Last 24 hours) at 1/14/2023 1244  Last data filed at 1/14/2023 1102  Gross per 24 hour   Intake 1860 ml   Output 350 ml   Net 1510 ml     General: awake, oriented X 1   HEENT: Normocephalic atraumatic head  Chest:  clear to auscultation bilaterally without respiratory distress  CVS: regular rate and rhythm  Abdominal: soft, nontender, positive bowel sounds  Extremities: no cyanosis or edema  Skin: warm and dry without rash      Labs:  Results from last 7 days   Lab Units 01/14/23  0505 01/12/23  0754 01/11/23  1330   WBC 10*3/mm3 11.47* 10.48 10.38   HEMOGLOBIN g/dL 11.4* 11.8* 11.6*   HEMATOCRIT % 36.3 37.4 39.1   PLATELETS 10*3/mm3 199 250 247         Results from last 7 days   Lab Units 01/14/23  0505 01/13/23  1049 01/12/23  1752 01/11/23  1330 01/11/23  0504   SODIUM mmol/L 139 135* 137   < > 149*   POTASSIUM mmol/L 3.9 3.5 3.6   < > 3.8   CHLORIDE mmol/L 95* 90* 90*   < > 96*   CO2 mmol/L 31.0* 36.0* 35.0*   < > 38.0*   BUN mg/dL 75* 85* 78*   < > 69*   CREATININE mg/dL 3.28* 4.12* 4.29*   < > 3.89*   CALCIUM mg/dL 8.5* 7.8* 8.5*   < > 8.7   EGFR mL/min/1.73 13.6* 10.4* 9.9*   < > 11.1*   BILIRUBIN mg/dL 0.2 0.2  --   --  0.3   ALK PHOS U/L 115 102  --   --  94   ALT (SGPT) U/L 38* 24  --   --  39*   AST (SGOT) U/L 41* 21  --   --  40*   GLUCOSE mg/dL 105* 154* 189*   < > 103*    < > = values in this interval not displayed.       Radiology:   Imaging Results (Last 72 Hours)     ** No results found for the last 72 hours. **          Culture:  Blood Culture   Date Value Ref Range Status   01/05/2023 No growth at 4 days  Preliminary   01/05/2023 No growth at 4 days  Preliminary         Assessment   1.  Acute kidney injury/stage III/improving.  2.   Acute tubular necrosis.  3.  Hypernatremia resolved.  4.  Small bowel obstruction  5.  LLL pneumonia  6.  Combined systolic/diastolic CHF    Plan:  1.  Continue IV fluid.  2.  Continue IV antibiotics.  3.  Monitor renal function closely.  4.  Hemodialysis not recommended due to advanced dementia and poor quality of life.        1/14/2023  12:44 CST

## 2023-01-14 NOTE — PLAN OF CARE
Goal Outcome Evaluation:           Progress: no change  Outcome Evaluation: Patient with no c/o pain overnight. Turned every 2 hours. Bowel movement last night. VSS, safety maintained. IV fluids infusing. S 68-85 with BBB, trigeminy, and PVCs

## 2023-01-14 NOTE — PLAN OF CARE
Goal Outcome Evaluation:  Plan of Care Reviewed With: patient        Progress: no change  Outcome Evaluation: No co pain. Turn every 2 hours. BM today. Bp elevated the morning, norvasc and labetalol ordered. cont to monitor.

## 2023-01-14 NOTE — PLAN OF CARE
Problem: Adult Inpatient Plan of Care  Goal: Plan of Care Review  Outcome: Ongoing, Progressing  Flowsheets (Taken 1/13/2023 1802)  Progress: no change  Plan of Care Reviewed With: patient  Outcome Evaluation: Pt slept for large portion of the day and gets upset when awoken. S 62-86 BBB. VSS. Turned Q2H when pt allows. Still drinking boosts at meals. Did try some Trin[place] toast this AM. Awaiting for further code status discussion with guardian per palliative care NP.

## 2023-01-14 NOTE — PROGRESS NOTES
"    [facility] Medicine Services  INPATIENT PROGRESS NOTE    Patient Name: Susana Delcid  Date of Admission: 1/5/2023  Today's Date: 01/14/23  Length of Stay: 9  Primary Care Physician: Provider, No Known    Subjective   Chief Complaint: Weakness.   HPI   Renal function slowly improving with IV fluids.    She was more hypertensive this morning and I added amlodipine and as needed IV labetalol.    Dr. Gunn has been trying to get the patient's guardian to make her no CPR with comfort measures.  Apparently, this is going to go in front of a  soon.  He has sent forms and letters.    She has been asking everyone for \"red milk\" and she showed.  She also asked me about it.  What she means is whole milk in a red carton.     Review of Systems   Patient unable to participate with her dementia.     Objective    Temp:  [97.5 °F (36.4 °C)-98.9 °F (37.2 °C)] 98.9 °F (37.2 °C)  Heart Rate:  [68-96] 80  Resp:  [16-20] 20  BP: (115-178)/(44-72) 156/68  Physical Exam  Constitutional:       Appearance: She is ill-appearing.      Comments: Up in bed.  No family present.  Discussed with her nurse, April.   HENT:      Head: Normocephalic and atraumatic.   Eyes:      Conjunctiva/sclera: Conjunctivae normal.      Pupils: Pupils are equal, round, and reactive to light.   Neck:      Vascular: No JVD.   Cardiovascular:      Rate and Rhythm: Normal rate and regular rhythm.      Heart sounds: Normal heart sounds.      Comments: NSR.  Pulmonary:      Effort: Pulmonary effort is normal. No respiratory distress.      Breath sounds: Normal breath sounds. No wheezing or rales.      Comments: On room air.  Chest:      Chest wall: No tenderness.   Abdominal:      General: Bowel sounds are normal. There is no distension.      Palpations: Abdomen is soft.      Tenderness: There is no abdominal tenderness.   Musculoskeletal:         General: No tenderness or deformity. Normal range of motion.      Cervical back: Neck " "supple.      Comments: Frail.   Skin:     General: Skin is warm and dry.      Findings: No rash.   Neurological:      General: No focal deficit present.      Mental Status: She is alert. She is disoriented.      Cranial Nerves: No cranial nerve deficit.      Motor: Weakness present. No abnormal muscle tone.      Deep Tendon Reflexes: Reflexes normal.   Psychiatric:      Comments: Flat affect.  \"I'm okay, but I want some red milk.\"       Results Review:  I have reviewed the labs, radiology results, and diagnostic studies.    Laboratory Data:   Results from last 7 days   Lab Units 01/14/23  0505 01/12/23  0754 01/11/23  1330   WBC 10*3/mm3 11.47* 10.48 10.38   HEMOGLOBIN g/dL 11.4* 11.8* 11.6*   HEMATOCRIT % 36.3 37.4 39.1   PLATELETS 10*3/mm3 199 250 247     Results from last 7 days   Lab Units 01/14/23  0505 01/13/23  1049 01/12/23  1752 01/11/23  1330 01/11/23  0504   SODIUM mmol/L 139 135* 137   < > 149*   POTASSIUM mmol/L 3.9 3.5 3.6   < > 3.8   CHLORIDE mmol/L 95* 90* 90*   < > 96*   CO2 mmol/L 31.0* 36.0* 35.0*   < > 38.0*   BUN mg/dL 75* 85* 78*   < > 69*   CREATININE mg/dL 3.28* 4.12* 4.29*   < > 3.89*   CALCIUM mg/dL 8.5* 7.8* 8.5*   < > 8.7   BILIRUBIN mg/dL 0.2 0.2  --   --  0.3   ALK PHOS U/L 115 102  --   --  94   ALT (SGPT) U/L 38* 24  --   --  39*   AST (SGOT) U/L 41* 21  --   --  40*   GLUCOSE mg/dL 105* 154* 189*   < > 103*    < > = values in this interval not displayed.     I have reviewed the patient's current medications.     Assessment/Plan   Assessment  Active Hospital Problems    Diagnosis    • **SBO (small bowel obstruction) ([facility])    • Severe malnutrition ([facility])    • Unspecified bacterial pneumonia    • Acute on chronic combined systolic and diastolic CHF (congestive heart failure) (H[facility])    • Acute respiratory failure with hypoxia ([facility])    • LETITIA (acute kidney injury) ([facility])    • Stage 3b chronic kidney disease (CKD) (H[facility])    • Hyperkalemia    • Essential hypertension      Treatment Plan  The " patient was admitted on 1/5 by Dr. Samuels.  She resides at [facility] and is a marquez of the [facility].  Her legal guardian is Margarito Steel.  She has problems with dementia.  She presented with vomiting and had a CT scan suggestive of a small bowel obstruction.  She was admitted for further work-up and treatment.    She was evaluated by general surgery; Dr. Gabriel.  She had an NG tube placed.  She slowly had return of bowel function.  She has recently been cleared for soft, ground foods with nectar thick liquid.  She is not eating or drinking well despite this.    When she first presented, her serum creatinine was 3.40 and went up slightly to 4.01.  She had improvement of her renal function over the course of the first few days of admission and ultimately reached a serum creatinine of 1.47 on 1/9.  However, she has had further deterioration of renal function since that point in time and again reached a high serum creatinine of 4.49 on 1/11.  She has been receiving hydration and her serum creatinine is 3.28 today.  Dialysis was considered at one point, but Dr. Gunn and Dr. Napoles feel that this is not a good option for the patient given her dementia, advanced age, and other comorbidities. Dr. Gunn requested that the patient's guardian make her a no CPR and comfort measures only. He declined this. Apparently, this issue is going in front of a  soon.    Palliative care is following.  Notes from JAMIR Griffin reviewed.    SCDs for DVT prophylaxis.    Medical Decision Making  Number and Complexity of problems: Presented with 1 acute illness with systemic symptoms in the form of her small bowel obstruction; resolved.  Has now had difficulties with recurrent acute kidney injury superimposed on chronic kidney disease.  Nephrology and the prior attending do not wish for her to go on dialysis and are petitioning her guardian about this.  She also has moderately complex chronic illnesses in the  form of her dementia and severe malnutrition.  Differential Diagnosis: None considered at present.    Conditions and Status        Condition is worsening.     [facility] Data  External documents reviewed: None.   Cardiac tracing (EKG, telemetry) interpretation: None recently.   Radiology interpretation: Interpreted by radiology.   Labs reviewed: As above and reviewed prior renal function.   Any tests that were considered but not ordered: None at present.      Decision rules/scores evaluated (example [id number], Wells, etc): None at present.      Discussed with: her nurse, April.      Care Planning  Shared decision making: Patient unable to pariticipate. Has a state guardian; Margarito Steel.   Code status and discussions: Full with full interventions.     Disposition  Social Determinants of Health that impact treatment or disposition: Chronic SNF patient with state guardianship.   I expect the patient to be discharged to [facility] when appropriate.      Electronically signed by Parish Hope DO, 01/14/23, 11:42 CST.

## 2023-01-15 LAB
ANION GAP SERPL CALCULATED.3IONS-SCNC: 11 MMOL/L (ref 5–15)
BUN SERPL-MCNC: 55 MG/DL (ref 8–23)
BUN/CREAT SERPL: 28.1 (ref 7–25)
CALCIUM SPEC-SCNC: 7.9 MG/DL (ref 8.6–10.5)
CHLORIDE SERPL-SCNC: 104 MMOL/L (ref 98–107)
CO2 SERPL-SCNC: 29 MMOL/L (ref 22–29)
CREAT SERPL-MCNC: 1.96 MG/DL (ref 0.57–1)
EGFRCR SERPLBLD CKD-EPI 2021: 25.3 ML/MIN/1.73
GLUCOSE SERPL-MCNC: 99 MG/DL (ref 65–99)
POTASSIUM SERPL-SCNC: 3.9 MMOL/L (ref 3.5–5.2)
SODIUM SERPL-SCNC: 144 MMOL/L (ref 136–145)

## 2023-01-15 PROCEDURE — 80048 BASIC METABOLIC PNL TOTAL CA: CPT | Performed by: INTERNAL MEDICINE

## 2023-01-15 PROCEDURE — 94799 UNLISTED PULMONARY SVC/PX: CPT

## 2023-01-15 RX ORDER — AMLODIPINE BESYLATE 10 MG/1
10 TABLET ORAL
Status: DISCONTINUED | OUTPATIENT
Start: 2023-01-15 | End: 2023-01-16 | Stop reason: HOSPADM

## 2023-01-15 RX ORDER — CARVEDILOL 25 MG/1
25 TABLET ORAL 2 TIMES DAILY WITH MEALS
Status: DISCONTINUED | OUTPATIENT
Start: 2023-01-15 | End: 2023-01-16 | Stop reason: HOSPADM

## 2023-01-15 RX ADMIN — CARVEDILOL 25 MG: 25 TABLET, FILM COATED ORAL at 19:05

## 2023-01-15 RX ADMIN — IPRATROPIUM BROMIDE 0.5 MG: 0.5 SOLUTION RESPIRATORY (INHALATION) at 21:53

## 2023-01-15 RX ADMIN — ALBUTEROL SULFATE 1.25 MG: 2.5 SOLUTION RESPIRATORY (INHALATION) at 21:53

## 2023-01-15 RX ADMIN — Medication 10 ML: at 23:12

## 2023-01-15 RX ADMIN — SODIUM CHLORIDE 75 ML/HR: 9 INJECTION, SOLUTION INTRAVENOUS at 19:05

## 2023-01-15 RX ADMIN — LABETALOL HYDROCHLORIDE 10 MG: 5 INJECTION, SOLUTION INTRAVENOUS at 05:00

## 2023-01-15 NOTE — NURSING NOTE
Patient refusing complete assessment at this time and to have daily labs drawn. Will re attempt with medication pass re attempt at lunch.

## 2023-01-15 NOTE — PROGRESS NOTES
[facility] Medicine Services  INPATIENT PROGRESS NOTE    Patient Name: Susana Delcid  Date of Admission: 1/5/2023  Today's Date: 01/15/23  Length of Stay: 10  Primary Care Physician: Provider, No Known    Subjective   Chief Complaint: Weakness.   HPI   Refused labs this morning. Agitated at times.     BP has been up.  Started amlodipine yesterday.  I will increase this and also increase her carvedilol.     Dr. Cobb is familiar with this patient and will resume her care tomorrow.  He needs to reinvestigate her CODE STATUS and plan of care after Dr. Gunn sent a letter to the court regarding this.    Review of Systems  Patient unable to participate with her dementia.     Objective    Temp:  [97.5 °F (36.4 °C)-98.9 °F (37.2 °C)] 97.6 °F (36.4 °C)  Heart Rate:  [65-82] 73  Resp:  [18-20] 18  BP: (152-192)/(48-79) 192/79  Physical Exam  Constitutional:       Appearance: She is ill-appearing.      Comments: Up in bed.  No family present.  Discussed with her nurse, Gabriela.   HENT:      Head: Normocephalic and atraumatic.   Eyes:      Conjunctiva/sclera: Conjunctivae normal.      Pupils: Pupils are equal, round, and reactive to light.   Neck:      Vascular: No JVD.   Cardiovascular:      Rate and Rhythm: Normal rate and regular rhythm.      Heart sounds: Normal heart sounds.      Comments: NSR.  Pulmonary:      Effort: Pulmonary effort is normal. No respiratory distress.      Breath sounds: Normal breath sounds. No wheezing or rales.      Comments: On room air.  Chest:      Chest wall: No tenderness.   Abdominal:      General: Bowel sounds are normal. There is no distension.      Palpations: Abdomen is soft.      Tenderness: There is no abdominal tenderness.   Musculoskeletal:         General: No tenderness or deformity. Normal range of motion.      Cervical back: Neck supple.      Comments: Frail.   Skin:     General: Skin is warm and dry.      Findings: No rash.   Neurological:       "General: No focal deficit present.      Mental Status: She is alert. She is disoriented.      Cranial Nerves: No cranial nerve deficit.      Motor: Weakness present. No abnormal muscle tone.      Deep Tendon Reflexes: Reflexes normal.   Psychiatric:      Comments: Flat affect.  Again asks for \"red milk.\"       Results Review:  I have reviewed the labs, radiology results, and diagnostic studies.    Laboratory Data:   Refused labs this morning. We will see if she will allow us to obtain them later this morning so we can reassess her renal function.    I have reviewed the patient's current medications.     Assessment/Plan   Assessment  Active Hospital Problems    Diagnosis    • **SBO (small bowel obstruction) ([facility])    • Severe malnutrition ([facility])    • Unspecified bacterial pneumonia    • Acute on chronic combined systolic and diastolic CHF (congestive heart failure) ([facility])    • Acute respiratory failure with hypoxia ([facility])    • LETITIA (acute kidney injury) ([facility])    • Stage 3b chronic kidney disease (CKD) ([facility])    • Hyperkalemia    • Essential hypertension      Treatment Plan  The patient was admitted on 1/5 by Dr. Samuels.  She resides at [facility] and is a marquez of the [facility].  Her legal guardian is Margarito Steel.  She has problems with dementia.  She presented with vomiting and had a CT scan suggestive of a small bowel obstruction.  She was admitted for further work-up and treatment.    She was evaluated by general surgery; Dr. Gabriel.  She had an NG tube placed.  She slowly had return of bowel function.  She has recently been cleared for soft, ground foods with nectar thick liquid.  She is not eating or drinking well despite this.    When she first presented, her serum creatinine was 3.40 and went up slightly to 4.01.  She had improvement of her renal function over the course of the first few days of admission and ultimately reached a serum creatinine of 1.47 on 1/9.  However, she has had further " deterioration of renal function since that point in time and again reached a high serum creatinine of 4.49 on 1/11.  She has been receiving hydration and her serum creatinine is 3.28 today.  Dialysis was considered at one point, but Dr. Gunn and Dr. Napoles feel that this is not a good option for the patient given her dementia, advanced age, and other comorbidities. Dr. Gunn requested that the patient's guardian make her a no CPR and comfort measures only. He declined this. Apparently, this issue is going in front of a  soon.    Palliative care is following. Recent notes from JAMIR Griffin reviewed.    SCDs for DVT prophylaxis.    Medical Decision Making  Number and Complexity of problems: Presented with 1 acute illness with systemic symptoms in the form of her small bowel obstruction; resolved.  Has now had difficulties with recurrent acute kidney injury superimposed on chronic kidney disease.  Nephrology and the prior attending do not wish for her to go on dialysis and are petitioning her guardian about this.  She also has moderately complex chronic illnesses in the form of her dementia and severe malnutrition.  Differential Diagnosis: None considered at present.    Conditions and Status        Condition is worsening.     [facility] Data  External documents reviewed: None.   Cardiac tracing (EKG, telemetry) interpretation: None recently.   Radiology interpretation: Interpreted by radiology.   Labs reviewed: As above and reviewed prior renal function.   Any tests that were considered but not ordered: None at present.      Decision rules/scores evaluated (example [id number], Wells, etc): None at present.      Discussed with: her nurse, Gabriela.      Care Planning  Shared decision making: Patient unable to pariticipate. Has a state guardian; Margarito Steel.   Code status and discussions: Full with full interventions.     Disposition  Social Determinants of Health that impact treatment or disposition: Chronic  SNF patient with state guardianship.   I expect the patient to be discharged to [facility] when appropriate.      Electronically signed by Parish Hope DO, 01/15/23, 07:59 CST.

## 2023-01-15 NOTE — PROGRESS NOTES
Nephrology ([facility]) Progress Note      Patient:  Susana Delcid  YOB: 1940  Date of Service: 1/15/2023  MRN: [id number]   Acct: [id number]   Primary Care Physician: Provider, No Known  Advance Directive:   Code Status and Medical Interventions:   Ordered at: 01/05/23 2210     Level Of Support Discussed With:    Patient     Code Status (Patient has no pulse and is not breathing):    CPR (Attempt to Resuscitate)     Medical Interventions (Patient has pulse or is breathing):    Full Support     Admit Date: 1/5/2023       Hospital Day: 10  Referring Provider: Dony Samuels,       Patient personally seen and examined.  Complete chart including Consults, Notes, Operative Reports, Labs, Cardiology, and Radiology studies reviewed as able.    Chief complaint: Abnormal labs.    Subjective:  Susana Delcid is a 82 y.o. female for whom we were consulted for evaluation and treatment of acute kidney injury, hypernatremia, hypokalemia.  History of dementia.  Resides at [facility].  Brought to ER with nausea and vomiting on 1/05.  Imaging revealed small bowel obstruction and pneumonia. Creatinine was 3.4. Hospital course remarkable for some improvement of renal function and for some fluctuation of sodium and potassium levels. Sodium improving with change to hypotonic IV fluids.  She has recent significant decline in renal function which is now resolving.    This afternoon patient is more alert and awake.  She is mostly bed ridden and not drinking any fluid.    Allergies:  Aspirin, Codeine, and Motrin [ibuprofen]    Home Meds:  Medications Prior to Admission   Medication Sig Dispense Refill Last Dose   • acetaminophen (TYLENOL) 325 MG tablet Take 650 mg by mouth Every 6 (Six) Hours As Needed for Mild Pain .   Past Week   • ARIPiprazole (ABILIFY) 5 MG tablet Take 5 mg by mouth Daily.   1/5/2023   • carvedilol (COREG) 12.5 MG tablet Take 12.5 mg by mouth 2 (Two) Times a Day With  Meals.   1/5/2023   • cholecalciferol (VITAMIN D3) 1.25 MG (99424 UT) capsule Take 50,000 Units by mouth 1 (One) Time Per Week. One time a day every Tue for supplement   Past Week   • cloNIDine (CATAPRES) 0.1 MG tablet Take 0.1 mg by mouth Daily As Needed for High Blood Pressure. 1 tablet every 24 hours as needed for high BP for SBP greater than 180, DBP greater than 90   Past Month   • furosemide (LASIX) 40 MG tablet Take 40 mg by mouth Daily.   1/5/2023   • hydrALAZINE (APRESOLINE) 50 MG tablet Take 1 tablet by mouth Every 8 (Eight) Hours.   1/5/2023   • isosorbide dinitrate (ISORDIL) 20 MG tablet Take 1 tablet by mouth Every 8 (Eight) Hours.   1/5/2023   • potassium chloride (MICRO-K) 10 MEQ CR capsule Take 4 capsules by mouth Daily.   1/5/2023   • PARoxetine (PAXIL) 10 MG tablet Take 10 mg by mouth Every Morning.   Unknown       Medicines:  Current Facility-Administered Medications   Medication Dose Route Frequency Provider Last Rate Last Admin   • acetaminophen (TYLENOL) tablet 650 mg  650 mg Oral Q4H PRN Dony Samuels DO       • albuterol (PROVENTIL) nebulizer solution 0.5% 2.5 mg/0.5mL  1.25 mg Nebulization 4x Daily - RT Dony Samuels DO   1.25 mg at 01/14/23 0654    And   • ipratropium (ATROVENT) nebulizer solution 0.5 mg  0.5 mg Nebulization 4x Daily - RT Dony Samuels DO   0.5 mg at 01/14/23 0654   • amLODIPine (NORVASC) tablet 10 mg  10 mg Oral Q24H Parish Hope DO       • carvedilol (COREG) tablet 25 mg  25 mg Oral BID With Meals Parish Hope DO       • labetalol (NORMODYNE,TRANDATE) injection 10 mg  10 mg Intravenous Q6H PRN Parish Hope DO   10 mg at 01/15/23 0500   • ondansetron (ZOFRAN) injection 4 mg  4 mg Intravenous Q6H PRN Arvind, Ali Marissa, DO       • silver sulfadiazine (SILVADENE, SSD) 1 % cream   Topical Q12H PRN Dony Samuels, DO       • sodium chloride 0.9 % flush 10 mL  10 mL Intravenous Q12H Dony Samuels, DO   10 mL at 01/14/23 2102   • sodium  chloride 0.9 % flush 10 mL  10 mL Intravenous PRN Dony Samuels DO       • sodium chloride 0.9 % infusion 40 mL  40 mL Intravenous PRN Dony Samuels DO       • sodium chloride 0.9 % infusion  75 mL/hr Intravenous Continuous Parish Hope DO 75 mL/hr at 01/14/23 1554 75 mL/hr at 01/14/23 1554   • sodium chloride nasal spray 2 spray  2 spray Each Nare Claudio Fuentes MD   2 spray at 01/08/23 1634       Past Medical History:  Past Medical History:   Diagnosis Date   • Hyperlipidemia    • Hypertension        Past Surgical History:  Past Surgical History:   Procedure Laterality Date   • APPENDECTOMY     • HYSTERECTOMY     • TONSILLECTOMY     • WRIST SURGERY Right        Family History  History reviewed. No pertinent family history.    Social History  Social History     Socioeconomic History   • Marital status:    Tobacco Use   • Smoking status: Never   • Smokeless tobacco: Never   Substance and Sexual Activity   • Alcohol use: No   • Drug use: No   • Sexual activity: Defer       Review of Systems:  History obtained from chart review and the patient  General ROS: No fever or chills  Respiratory ROS: No cough, shortness of breath, wheezing  Cardiovascular ROS: No chest pain or palpitations  Gastrointestinal ROS: No abdominal pain or melena  Genito-Urinary ROS: No dysuria or hematuria  Psych ROS: No anxiety and depression  14 point ROS reviewed with the patient and negative except as noted above and in the HPI unless unable to obtain.    Objective:  Patient Vitals for the past 24 hrs:   BP Temp Temp src Pulse Resp SpO2 Weight   01/15/23 0915 151/74 98.4 °F (36.9 °C) Axillary 79 18 98 % --   01/15/23 0437 (!) 192/79 97.6 °F (36.4 °C) Axillary 73 18 100 % --   01/14/23 2305 152/48 97.5 °F (36.4 °C) Axillary 82 18 97 % --   01/14/23 2005 165/61 98.2 °F (36.8 °C) Oral 65 18 98 % 54.9 kg (121 lb)   01/14/23 1438 165/68 98.2 °F (36.8 °C) Oral 72 18 96 % --       Intake/Output Summary (Last 24 hours) at  1/15/2023 1414  Last data filed at 1/15/2023 1300  Gross per 24 hour   Intake 170 ml   Output --   Net 170 ml     General: awake, oriented X 1   HEENT: Normocephalic atraumatic head  Chest:  clear to auscultation bilaterally without respiratory distress  CVS: regular rate and rhythm  Abdominal: soft, nontender, positive bowel sounds  Extremities: no cyanosis or edema  Skin: warm and dry without rash      Labs:  Results from last 7 days   Lab Units 01/14/23  0505 01/12/23  0754 01/11/23  1330   WBC 10*3/mm3 11.47* 10.48 10.38   HEMOGLOBIN g/dL 11.4* 11.8* 11.6*   HEMATOCRIT % 36.3 37.4 39.1   PLATELETS 10*3/mm3 199 250 247         Results from last 7 days   Lab Units 01/14/23  0505 01/13/23  1049 01/12/23  1752 01/11/23  1330 01/11/23  0504   SODIUM mmol/L 139 135* 137   < > 149*   POTASSIUM mmol/L 3.9 3.5 3.6   < > 3.8   CHLORIDE mmol/L 95* 90* 90*   < > 96*   CO2 mmol/L 31.0* 36.0* 35.0*   < > 38.0*   BUN mg/dL 75* 85* 78*   < > 69*   CREATININE mg/dL 3.28* 4.12* 4.29*   < > 3.89*   CALCIUM mg/dL 8.5* 7.8* 8.5*   < > 8.7   EGFR mL/min/1.73 13.6* 10.4* 9.9*   < > 11.1*   BILIRUBIN mg/dL 0.2 0.2  --   --  0.3   ALK PHOS U/L 115 102  --   --  94   ALT (SGPT) U/L 38* 24  --   --  39*   AST (SGOT) U/L 41* 21  --   --  40*   GLUCOSE mg/dL 105* 154* 189*   < > 103*    < > = values in this interval not displayed.       Radiology:   Imaging Results (Last 72 Hours)     ** No results found for the last 72 hours. **          Culture:  Blood Culture   Date Value Ref Range Status   01/05/2023 No growth at 4 days  Preliminary   01/05/2023 No growth at 4 days  Preliminary         Assessment   1.  Acute kidney injury/stage III/improving.  2.  Acute tubular necrosis.  3.  Hypernatremia resolved.  4.  Small bowel obstruction  5.  LLL pneumonia  6.  Combined systolic/diastolic CHF    Plan:  1.  Continue IV fluid.  2.  Stat basic metabolic profile today  3.  Monitor renal function closely.  4.  Hemodialysis not recommended due to  advanced dementia and poor quality of life.        1/15/2023  14:14 CST

## 2023-01-15 NOTE — PLAN OF CARE
Pt Alert/Oriented x 3 at times with confusion throughout most of shift. SR c PVC and BBB on monitor. Vital signs stable. x 1. PRN HTN med given x 1.  Unable to recheck BP s/p PRN med. Pt became very combative. very aggressive to staff at times . 1 large BM . No c/o CP/SOB/ or increased WOB.  No noted issues or concerns noted.  Will contiue to monitor for acute changes in status

## 2023-01-16 ENCOUNTER — READMISSION MANAGEMENT (OUTPATIENT)
Dept: CALL CENTER | Facility: HOSPITAL | Age: 83
End: 2023-01-16
Payer: MEDICARE

## 2023-01-16 VITALS
SYSTOLIC BLOOD PRESSURE: 102 MMHG | WEIGHT: 120 LBS | RESPIRATION RATE: 16 BRPM | OXYGEN SATURATION: 94 % | HEIGHT: 69 IN | DIASTOLIC BLOOD PRESSURE: 41 MMHG | BODY MASS INDEX: 17.77 KG/M2 | TEMPERATURE: 97.4 F | HEART RATE: 67 BPM

## 2023-01-16 LAB
ALBUMIN SERPL-MCNC: 2.7 G/DL (ref 3.5–5.2)
ALBUMIN/GLOB SERPL: 1 G/DL
ALP SERPL-CCNC: 110 U/L (ref 39–117)
ALT SERPL W P-5'-P-CCNC: 63 U/L (ref 1–33)
ANION GAP SERPL CALCULATED.3IONS-SCNC: 9 MMOL/L (ref 5–15)
AST SERPL-CCNC: 45 U/L (ref 1–32)
BASOPHILS # BLD AUTO: 0.05 10*3/MM3 (ref 0–0.2)
BASOPHILS NFR BLD AUTO: 0.5 % (ref 0–1.5)
BILIRUB SERPL-MCNC: 0.2 MG/DL (ref 0–1.2)
BUN SERPL-MCNC: 49 MG/DL (ref 8–23)
BUN/CREAT SERPL: 30.1 (ref 7–25)
CALCIUM SPEC-SCNC: 8.2 MG/DL (ref 8.6–10.5)
CHLORIDE SERPL-SCNC: 107 MMOL/L (ref 98–107)
CO2 SERPL-SCNC: 27 MMOL/L (ref 22–29)
CREAT SERPL-MCNC: 1.63 MG/DL (ref 0.57–1)
DEPRECATED RDW RBC AUTO: 49.3 FL (ref 37–54)
EGFRCR SERPLBLD CKD-EPI 2021: 31.6 ML/MIN/1.73
EOSINOPHIL # BLD AUTO: 0.22 10*3/MM3 (ref 0–0.4)
EOSINOPHIL NFR BLD AUTO: 2.1 % (ref 0.3–6.2)
ERYTHROCYTE [DISTWIDTH] IN BLOOD BY AUTOMATED COUNT: 14.9 % (ref 12.3–15.4)
GLOBULIN UR ELPH-MCNC: 2.8 GM/DL
GLUCOSE SERPL-MCNC: 128 MG/DL (ref 65–99)
HCT VFR BLD AUTO: 34.6 % (ref 34–46.6)
HGB BLD-MCNC: 10.4 G/DL (ref 12–15.9)
IMM GRANULOCYTES # BLD AUTO: 0.09 10*3/MM3 (ref 0–0.05)
IMM GRANULOCYTES NFR BLD AUTO: 0.9 % (ref 0–0.5)
LYMPHOCYTES # BLD AUTO: 1.67 10*3/MM3 (ref 0.7–3.1)
LYMPHOCYTES NFR BLD AUTO: 15.8 % (ref 19.6–45.3)
MCH RBC QN AUTO: 27.4 PG (ref 26.6–33)
MCHC RBC AUTO-ENTMCNC: 30.1 G/DL (ref 31.5–35.7)
MCV RBC AUTO: 91.3 FL (ref 79–97)
MONOCYTES # BLD AUTO: 0.98 10*3/MM3 (ref 0.1–0.9)
MONOCYTES NFR BLD AUTO: 9.3 % (ref 5–12)
NEUTROPHILS NFR BLD AUTO: 7.54 10*3/MM3 (ref 1.7–7)
NEUTROPHILS NFR BLD AUTO: 71.4 % (ref 42.7–76)
NRBC BLD AUTO-RTO: 0 /100 WBC (ref 0–0.2)
PLATELET # BLD AUTO: 230 10*3/MM3 (ref 140–450)
PMV BLD AUTO: 11.9 FL (ref 6–12)
POTASSIUM SERPL-SCNC: 4.2 MMOL/L (ref 3.5–5.2)
PROT SERPL-MCNC: 5.5 G/DL (ref 6–8.5)
RBC # BLD AUTO: 3.79 10*6/MM3 (ref 3.77–5.28)
SARS-COV-2 RNA RESP QL NAA+PROBE: NOT DETECTED
SODIUM SERPL-SCNC: 143 MMOL/L (ref 136–145)
WBC NRBC COR # BLD: 10.55 10*3/MM3 (ref 3.4–10.8)

## 2023-01-16 PROCEDURE — 94664 DEMO&/EVAL PT USE INHALER: CPT

## 2023-01-16 PROCEDURE — 85025 COMPLETE CBC W/AUTO DIFF WBC: CPT | Performed by: NURSE PRACTITIONER

## 2023-01-16 PROCEDURE — 99232 SBSQ HOSP IP/OBS MODERATE 35: CPT

## 2023-01-16 PROCEDURE — 94799 UNLISTED PULMONARY SVC/PX: CPT

## 2023-01-16 PROCEDURE — 87635 SARS-COV-2 COVID-19 AMP PRB: CPT | Performed by: FAMILY MEDICINE

## 2023-01-16 PROCEDURE — 80053 COMPREHEN METABOLIC PANEL: CPT | Performed by: NURSE PRACTITIONER

## 2023-01-16 RX ORDER — CARVEDILOL 25 MG/1
25 TABLET ORAL 2 TIMES DAILY WITH MEALS
Start: 2023-01-16

## 2023-01-16 RX ORDER — ECHINACEA PURPUREA EXTRACT 125 MG
2 TABLET ORAL AS NEEDED
Refills: 12
Start: 2023-01-16

## 2023-01-16 RX ORDER — AMLODIPINE BESYLATE 10 MG/1
10 TABLET ORAL
Start: 2023-01-17

## 2023-01-16 RX ADMIN — CARVEDILOL 25 MG: 25 TABLET, FILM COATED ORAL at 09:40

## 2023-01-16 RX ADMIN — IPRATROPIUM BROMIDE 0.5 MG: 0.5 SOLUTION RESPIRATORY (INHALATION) at 06:06

## 2023-01-16 RX ADMIN — IPRATROPIUM BROMIDE 0.5 MG: 0.5 SOLUTION RESPIRATORY (INHALATION) at 14:36

## 2023-01-16 RX ADMIN — ALBUTEROL SULFATE 1.25 MG: 2.5 SOLUTION RESPIRATORY (INHALATION) at 10:31

## 2023-01-16 RX ADMIN — ALBUTEROL SULFATE 1.25 MG: 2.5 SOLUTION RESPIRATORY (INHALATION) at 14:36

## 2023-01-16 RX ADMIN — IPRATROPIUM BROMIDE 0.5 MG: 0.5 SOLUTION RESPIRATORY (INHALATION) at 10:31

## 2023-01-16 RX ADMIN — ALBUTEROL SULFATE 1.25 MG: 2.5 SOLUTION RESPIRATORY (INHALATION) at 06:06

## 2023-01-16 RX ADMIN — AMLODIPINE BESYLATE 10 MG: 10 TABLET ORAL at 09:40

## 2023-01-16 NOTE — PLAN OF CARE
Goal Outcome Evaluation:  Plan of Care Reviewed With: patient        Progress: declining  Outcome Evaluation: patient refused many things from healthcare team today. morning labs were finally drawn in the afternoon, and patient refused meals until dinnertime. Does not know where she is, and is rude to staff at times. Patient had 1 large Bowel and Urine incontinence episode today. Overall patient appears poor

## 2023-01-16 NOTE — CASE MANAGEMENT/SOCIAL WORK
Continued Stay Note  [facility]     Patient Name: Susana Delcid  MRN: [id number]  Today's Date: 1/16/2023    Admit Date: 1/5/2023    Plan: [facility]   Discharge Plan     Row Name 01/16/23 1056       Plan    Plan [facility]    Final Discharge Disposition Code 03 - skilled nursing facility (SNF)    Final Note Patient is discharged today, back to [facility]. SW notified Billie in admissions. Discharge summary and covid swab fax to [phone]. Rn report [phone].                           Expected Discharge Date and Time     Expected Discharge Date Expected Discharge Time    Jan 16, 2023             SHERIN Mansfield

## 2023-01-16 NOTE — DISCHARGE SUMMARY
[facility] Medicine Services  DISCHARGE SUMMARY       Date of Admission: 1/5/2023  Date of Discharge:  1/16/2023  Primary Care Physician: Provider, No Known    Presenting Problem/History of Present Illness:      Final Discharge Diagnoses:  Active Hospital Problems    Diagnosis    • **SBO (small bowel obstruction) ([facility])    • Severe malnutrition ([facility])    • Unspecified bacterial pneumonia    • Acute on chronic combined systolic and diastolic CHF (congestive heart failure) ([facility])    • Acute respiratory failure with hypoxia ([facility])    • LETITIA (acute kidney injury) ([facility])    • Stage 3b chronic kidney disease (CKD) ([facility])    • Hyperkalemia    • Essential hypertension        Consults: Nephrology.  Palliative care .General surgery.    Pertinent Test Results:   Results for orders placed during the hospital encounter of 01/25/18    Adult Transthoracic Echo Complete W/ Cont if Necessary Per Protocol    Interpretation Summary  · Left ventricular systolic function is moderately decreased. Estimated EF appears to be in the range of 31 - 35%.  · The left ventricular cavity is moderate-to-severely dilated.  · Left ventricular wall thickness is consistent with mild concentric hypertrophy.  · Left ventricular diastolic dysfunction.  · Right ventricular cavity is dilated. Moderately reduced right ventricular systolic function noted.  · Mild mitral valve regurgitation is present  · Mild tricuspid valve regurgitation is present. Estimated right ventricular systolic pressure from tricuspid regurgitation is mildly elevated (35-45 mmHg).      Imaging Results (All)     Procedure Component Value Units Date/Time    US Renal Bilateral [[id number]] Collected: 01/10/23 1430     Updated: 01/10/23 1436    Narrative:      HISTORY: Acute kidney injury     Renal ultrasound: Sonographic imaging of the kidneys and bladder is  performed.     COMPARISON: CT abdomen pelvis on 01/05/2023     FINDINGS: The kidneys appear  isoechoic compared to the liver. Right  kidney measures 8.7 x 5.0 x 4.8 cm. There is a 1.7 cm inferior right  renal cyst with few internal echoes supporting a proteinaceous cyst. The  left kidney measures 9.6 x 5.2 x 4.9 cm. There is a 2.6 cm superior  right renal cyst with a 3.7 cm mid lateral left renal cyst. No  convincing solid renal mass. No hydronephrosis. No obstructing  intrarenal stones. No abnormal perinephric fluid collection.     Bladder appears normal. Visible abdominal aorta is normal in caliber.  Proximal IVC is patent.       Impression:      1. Simple appearing left renal cysts with a 1.7 cm  proteinaceous/hemorrhagic right renal cyst. No convincing solid renal  mass. No hydronephrosis.  This report was finalized on 01/10/2023 14:33 by Dr. Mindy Park MD.    XR Chest 1 View [[id number]] Collected: 01/09/23 1429     Updated: 01/09/23 1434    Narrative:      EXAMINATION: XR CHEST 1 VW- 1/9/2023 2:29 PM CST     HISTORY: Worsening pulmonary status; K56.609-Unspecified intestinal  obstruction, unspecified as to partial versus complete obstruction;  Z78.9-Other specified health status; Z74.09-Other reduced mobility     REPORT: A frontal view the chest was obtained.     COMPARISON: Chest x-ray 01/05/2023.     The right lung is clear, there is partial consolidation of the left base  with most of the left hemidiaphragm is obscured. There is also mild  elevation of left hemidiaphragm. The side-port and tip of the NG tube  appears to be at the gastric fundus level, satisfactory. No pneumothorax  is identified. A small left pleural effusion is not excluded. Contrast  is noted within the upper colon. Diffuse osteopenia is present. There  appear to be multiple chronic insufficiency compression deformities in  the thoracic spine.       Impression:      Continued consolidation of the medial left lung base with  obscuration of most of the left hemidiaphragm, which may represent  pneumonia or atelectasis. There  also appears to be a small left pleural  effusion. The newly inserted NG tube appears to be in satisfactory  position.  This report was finalized on 01/09/2023 14:31 by Dr. Gilmer Santizo MD.    XR Abdomen KUB [[id number]] Collected: 01/09/23 0931     Updated: 01/09/23 1350    Narrative:      EXAMINATION: XR ABDOMEN KUB- 1/9/2023 9:31 AM CST     HISTORY: NG tube placement; K56.609-Unspecified intestinal obstruction,  unspecified as to partial versus complete obstruction.     REPORT: A supine view of the upper abdomen was obtained.     COMPARISON: KUB 01/09/2023 0338 hours.     The tip and side-port of the NG tube project over the gastric fundus in  satisfactory position. This is stable. There is moderate volume loss in  the left lung base. The visualized bowel gas pattern appears  nonobstructive. Moderately heavy calcified plaque is noted within the  abdominal aorta and iliac arteries.       Impression:      Stable satisfactory position of the NG tube with the tip and  side-port in the gastric fundus.  This report was finalized on 01/09/2023 09:33 by Dr. Gilmer Santizo MD.    FL Small Bowel Follow Through Single-Contrast [[id number]] Collected: 01/09/23 1238     Updated: 01/09/23 1244    Narrative:      EXAMINATION: FL SMALL BOWEL FOLLOW THROUGH SINGLE-CONTRAST- 1/9/2023  12:38 PM CST     HISTORY: Patient with small bowel obstruction please evaluate with a  small bowel follow-through can  complete through the NG tube;  K56.609-Unspecified intestinal obstruction, unspecified as to partial  versus complete obstruction.     REPORT: Small bowel follow-through was performed with administration of  contrast through the existing NG tube.     COMPARISON: KUB on same day.     After contrast was administered through the NG tube, images were  obtained at 15 minutes, 30 minutes and 2 hours. At 15 minutes, the  stomach is mostly opacified and there is contrast within the upper  abdominal loops of jejunum, which are mildly  distended, measuring up to  4.2 cm. No jejunal mucosal thickening is identified. There is a small  duodenal diverticulum. At 30 minutes, most of the contrast is seen in  the jejunum and proximal ileum, without mass effect. At 2 hours,  contrast is seen throughout the small intestine and in the colon to the  level of the rectum. There is residual contrast in the stomach as well.       Impression:      Contrast reaches the rectum by 2 hours and there is no  evidence of small bowel obstruction.  This report was finalized on 01/09/2023 12:41 by Dr. Gilmer Santizo MD.    XR Abdomen KUB [[id number]] Collected: 01/09/23 0606     Updated: 01/09/23 0612    Narrative:      EXAMINATION: XR ABDOMEN KUB-     1/9/2023 3:30 AM CST     HISTORY: new NG tube placement verification; K56.609-Unspecified  intestinal obstruction, unspecified as to partial versus complete  obstruction     A frontal projection of the upper abdomen including the lower chest is  compared with the previous study dated 01/08/2023     There is interval minimal advancement of the gastric tube. The distal  sidehole now appears to be distal to the gastroesophageal junction. The  distal end of the tube is in the proximal stomach.     There is a left lower lung consolidation with complete obliteration left  diaphragm.     The limited visualized abdomen show moderate gas and stool in the colon.  No evidence of dilatation or large bowel loops.     Both kidneys are obscured by the bowel contents. Small calcification is  seen projected over the right midabdomen medially. These may represent  calcified lymph nodes, granulomatous or calculi?. These are similar to  the previous study.     Severe atheromatous changes thoracic and abdominal aorta are noted.     No acute bony abnormality.       Impression:      1. Distal end of the nasogastric tube is in the proximal stomach. The  distal sidehole is adjacent and below the gastroesophageal junction.  This report was finalized  on 01/09/2023 06:09 by Dr. Lisa Meng MD.    XR Abdomen KUB [[id number]] Collected: 01/08/23 1543     Updated: 01/08/23 1547    Narrative:      XR ABDOMEN KUB- 1/8/2023 3:38 PM CST     HISTORY: PLACEMENT OF NEW NG TUBE; K56.609-Unspecified intestinal  obstruction, unspecified as to partial versus complete obstruction     FINDINGS:  Feeding tube is seen with the tip located in the upper  gastric body. Catheter sidehole remains at the gastroesophageal  junction.     No gross intraperitoneal free air. Atelectasis in the left lung base.  Right lung base is clear.          Impression:      Feeding tube tip located in the upper gastric body. Catheter  sidehole remains at the gastroesophageal junction. Consider additional  slight advancement.     This report was finalized on 01/08/2023 15:44 by Dr Joe Perez, .    XR Abdomen Flat & Upright [[id number]] Collected: 01/08/23 0838     Updated: 01/08/23 0844    Narrative:      EXAM/TECHNIQUE: XR ABDOMEN FLAT AND UPRIGHT-     INDICATION: Small bowel obstruction; K56.609-Unspecified intestinal  obstruction, unspecified as to partial versus complete obstruction     COMPARISON: 01/07/2023     FINDINGS:     Slight decrease in diffuse small bowel distention with loops measuring  up to 3.5 cm diameter (previously 4 cm). Multiple bowel loops in the  LEFT abdomen that were previously dilated, no longer appear dilated. No  visible pneumatosis or portal venous gas. Enteric tube is in the mid  stomach. LEFT lower lobe consolidation is again noted. Degenerative  change in the lumbar spine. Multiple pelvic embolus and atherosclerotic  vascular calcifications.       Impression:         Slight decrease in persistent small bowel distention.  This report was finalized on 01/08/2023 08:40 by Dr. Zan Cantor MD.    XR Abdomen Flat & Upright [[id number]] Collected: 01/07/23 1452     Updated: 01/07/23 1457    Narrative:      EXAM/TECHNIQUE: XR ABDOMEN FLAT AND UPRIGHT-     INDICATION:  Follow-up small bowel obstruction KUB flat and upright;  K56.609-Unspecified intestinal obstruction, unspecified as to partial  versus complete obstruction     COMPARISON: 01/06/2023     FINDINGS:     Enteric tube is in the mid stomach. Gaseous bowel distention persists  but appears decreased. Small bowel loops measure up to 4 cm (previously  5.5 cm). No visible pneumatosis or portal venous gas. Moderate to large  volume stool in the rectum. Redemonstrated LEFT lower lobe  consolidation. No acute osseous finding.       Impression:         1.  Persistent but decreased gaseous bowel distention.  2.  Feeding tube tip is in the mid stomach.  This report was finalized on 01/07/2023 14:54 by Dr. Zan Cantor MD.    XR Abdomen KUB [[id number]] Collected: 01/06/23 1206     Updated: 01/06/23 1210    Narrative:      XR ABDOMEN KUB- 1/6/2023 11:45 AM CST     HISTORY: ng placement; K56.609-Unspecified intestinal obstruction,  unspecified as to partial versus complete obstruction     FINDINGS:  Feeding tube is seen with the tip located in the upper  gastric body. Catheter sidehole is at the gastroesophageal junction.  Multiple dilated small bowel loops in the upper abdomen measuring up to  5.5 cm diameter. No gross intraperitoneal free air. Atelectasis in the  left lower lobe.       Impression:      Feeding tube tip located in the upper gastric body with  sidehole remaining at the gastroesophageal junction. Additional slight  advancement is recommended, perhaps 5 cm.  This report was finalized on 01/06/2023 12:07 by Dr Joe Perez, .    XR Abdomen KUB [[id number]] Collected: 01/06/23 1126     Updated: 01/06/23 1134    Narrative:      EXAMINATION: XR ABDOMEN KUB- 1/6/2023 11:26 AM CST     HISTORY: Follow-up small bowel obstruction; K56.609-Unspecified  intestinal obstruction, unspecified as to partial versus complete  obstruction     REPORT: A supine view of the abdomen was obtained, centered over the  diaphragm. The NG tube  follows the course of the left main stem  bronchus, with the tip in the deep left lung base in an aberrant  position. This partial consolidation of the medial left lung base. There  may be a small left pleural effusion. No obvious pneumothorax is seen on  this image. There are dilated gas-filled bowel loops in the upper  abdomen as before. Heavy atherosclerosis is noted within the  thoracoabdominal aorta.       Impression:         Aberrant position of the NG tube, which follows the course of the left  main stem bronchus, with the tip in the deep left lung base. REPORT was  called to the patient's nurse on 4B immediately at 1128 hours on  01/06/2023..  This report was finalized on 01/06/2023 11:31 by Dr. Gilmer Santizo MD.    XR Abdomen KUB [[id number]] Collected: 01/05/23 2052     Updated: 01/05/23 2056    Narrative:      EXAMINATION: KUB radiograph 01/05/2023     HISTORY: NG tube placement     FINDINGS: An NG tube has been successfully advanced into the proximal  body of the stomach.  This report was finalized on 01/05/2023 20:53 by Dr. Randall Huerta MD.    XR Chest 1 View [[id number]] Collected: 01/05/23 2035     Updated: 01/05/23 2039    Narrative:      EXAMINATION: Chest 1 view 01/05/2023     HISTORY: Hypoxia.     FINDINGS: Upright frontal projection of the chest demonstrates left  lower lobe consolidation with partial silhouetting of the left  diaphragm. There is atheromatous calcification of the thoracic aorta.  Lungs are otherwise clear. A small left effusion is suspected with  blunting of the lateral costophrenic angle. There is gaseous distention  of the small bowel within the upper abdomen.       Impression:      1.. Left lower lobe consolidation suggesting a infiltrate. A small left  effusion is present. Lungs are otherwise clear.  This report was finalized on 01/05/2023 20:36 by Dr. Randall Huerta MD.    CT Abdomen Pelvis Without Contrast [[id number]] Collected: 01/05/23 1925     Updated: 01/05/23  1944    Narrative:      CT ABDOMEN PELVIS WO CONTRAST- 1/5/2023 6:36 PM CST     HISTORY: eval for SBO      COMPARISON: None      DLP: 487 mGy cm. All CT scans are performed using dose optimization  techniques as appropriate to the performed exam and including at least  one of the following: Automated exposure control, adjustment of the mA  and/or kV according to size, and the use of the iterative reconstruction  technique.     TECHNIQUE: Noncontrast enhanced images of the abdomen and pelvis  obtained without oral contrast.      FINDINGS: RIGHT basilar atelectasis is present. There is more confluent  disease within the left lower lobe suggesting left lower lobe pneumonia  with a small left-sided parapneumonic effusion. There is moderate  cardiomegaly. Heavy coronary calcifications are present. There is no  evidence of pericardial effusion.   There is enlargement of the pulmonary arteries suggesting pulmonary  arterial hypertension..      LIVER: There is colonic interposition beneath the right hemidiaphragm.  The liver is homogeneous in density without evidence of focal hepatic  mass..      BILIARY SYSTEM: There is increased density dependently within the  gallbladder suggesting layering gallstones. No pericholecystic  inflammatory changes are present. Mild extrahepatic biliary dilatation  is present.     PANCREAS: No focal pancreatic lesion.      SPLEEN: Unremarkable.      KIDNEYS AND ADRENALS: There are cortical cysts of both kidneys. There is  a hyperdense lesion involving the midpole of the right kidney for which  I would favor hemorrhagic cyst. This measures 1.2 cm in size measuring  Hounsfield units of 90. No perinephric fluid collections are present..   The ureters are decompressed and normal in appearance.     RETROPERITONEUM: There is heavy atheromatous calcification of the  abdominal aorta without evidence of aneurysm. There is no evidence of  retroperitoneal hematoma or adenopathy..      GI TRACT: There is  a small amount of retained contrast and fecal  material within the colon. No evidence of constipation. There are  diverticula within the descending and sigmoid colon with no evidence of  acute diverticulitis.. The appendix is surgically absent. There is  moderate small bowel distention as well as fluid distention of the  stomach suggesting a mechanical bowel obstruction. The more distal ileum  is decompressed with the apparent transition noted on images 61 through  68 just to the right of midline within the pelvis. This may be on the  basis of adhesions. There is no evidence of pneumatosis. No evidence of  pneumoperitoneum..     OTHER: There is no mesenteric mass, lymphadenopathy or fluid collection.  The osseous structures and soft tissues demonstrate no worrisome  lesions. No free fluid is present.      PELVIS: The patient has undergone prior hysterectomy.. The urinary  bladder is normal in appearance.       Impression:      1. FINDINGS consistent with a mechanical bowel obstruction with moderate  small bowel and gastric distention. The more distal ileum is  decompressed and there appears to be a transition point to the right of  midline in the lower pelvis. This may be on the basis of adhesions.  There is retained contrast and fecal material within the colon. There is  diverticulosis of the descending and sigmoid colon with no evidence of  diverticulitis. No evidence of free air or pneumoperitoneum.  2. Left lower lobe pneumonia with a small left parapneumonic effusion.  There is cardiomegaly. No pericardial effusion is present. There are  heavy coronary calcifications present..  3. Suspected cholelithiasis. No pericholecystic inflammatory changes  present.  4. Cortical cysts of the kidneys including a suspected hemorrhagic cyst  of the right kidney.        This report was finalized on 01/05/2023 19:41 by Dr. Randall Huerta MD.        LAB RESULTS:      Lab 01/14/23  0505 01/12/23  0754 01/11/23  1330  01/11/23  0504 01/10/23  0457   WBC 11.47* 10.48 10.38 12.21* 11.95*   HEMOGLOBIN 11.4* 11.8* 11.6* 12.3 11.8*   HEMATOCRIT 36.3 37.4 39.1 43.1 40.8   PLATELETS 199 250 247 225 310   NEUTROS ABS 8.83* 7.71*  --  8.37* 9.08*   IMMATURE GRANS (ABS) 0.22* 0.31*  --  0.32* 0.17*   LYMPHS ABS 1.55 1.60  --  2.07 1.50   MONOS ABS 0.65 0.72  --  1.22* 1.14*   EOS ABS 0.19 0.12  --  0.18 0.03   MCV 86.4 87.6 90.7 93.9 93.4         Lab 01/15/23  1427 01/14/23  0505 01/13/23  1049 01/12/23  1752 01/12/23  1239   SODIUM 144 139 135* 137 140   POTASSIUM 3.9 3.9 3.5 3.6 3.8   CHLORIDE 104 95* 90* 90* 93*   CO2 29.0 31.0* 36.0* 35.0* 37.0*   ANION GAP 11.0 13.0 9.0 12.0 10.0   BUN 55* 75* 85* 78* 74*   CREATININE 1.96* 3.28* 4.12* 4.29* 4.32*   EGFR 25.3* 13.6* 10.4* 9.9* 9.8*   GLUCOSE 99 105* 154* 189* 223*   CALCIUM 7.9* 8.5* 7.8* 8.5* 8.0*         Lab 01/14/23  0505 01/13/23  1049 01/11/23  0504 01/10/23  0456   TOTAL PROTEIN 5.8* 5.2* 5.8* 6.2   ALBUMIN 2.7* 2.4* 2.7* 2.8*   GLOBULIN 3.1 2.8 3.1 3.4   ALT (SGPT) 38* 24 39* 20   AST (SGOT) 41* 21 40* 28   BILIRUBIN 0.2 0.2 0.3 0.2   ALK PHOS 115 102 94 85                     Brief Urine Lab Results  (Last result in the past 365 days)      Color   Clarity   Blood   Leuk Est   Nitrite   Protein   CREAT   Urine HCG        01/12/23 0544             23.2             Microbiology Results (last 10 days)     Procedure Component Value - Date/Time    COVID PRE-OP / PRE-PROCEDURE SCREENING ORDER (NO ISOLATION) - Swab, Nasopharynx [[id number]]  (Normal) Collected: 01/09/23 0917    Lab Status: Final result Specimen: Swab from Nasopharynx Updated: 01/09/23 0948    Narrative:      The following orders were created for panel order COVID PRE-OP / PRE-PROCEDURE SCREENING ORDER (NO ISOLATION) - Swab, Nasopharynx.  Procedure                               Abnormality         Status                     ---------                               -----------         ------                      COVID-19,CEPHEID/JOIE,CO...[[id number]]  Normal              Final result                 Please view results for these tests on the individual orders.    COVID-19,CEPHEID/JOIE,COR/ALIYAH/PAD/MARIFER/MAD IN-HOUSE(OR EMERGENT/ADD-ON),NP SWAB IN TRANSPORT MEDIA 3-4 HR TAT, RT-PCR - Swab, Nasopharynx [[id number]]  (Normal) Collected: 01/09/23 0917    Lab Status: Final result Specimen: Swab from Nasopharynx Updated: 01/09/23 0948     COVID19 Not Detected    Narrative:      Fact sheet for providers: https://www.fda.gov/media/849736/download     Fact sheet for patients: https://www.fda.gov/media/676693/download  Fact sheet for providers: https://www.fda.gov/media/104080/download    Fact sheet for patients: https://www.[employer].gov/media/733271/download    Test performed by PCR.        HPI .  82-year-old female presents from [facility].  The patient is brought to the facility today for nausea and vomiting.  The patient was found to have abdominal distention.  An NG tube was placed in the emergency department.  CT scan demonstrated a small bowel obstruction.  The patient has dementia, and is unable to give any history.  She continues to ask for red milk.  Patient's prior creatinine reviewed from epic on 9/3/2021 was 1.35-1.84.  On my exam, she is mouth breathing.  She is tachycardic.  Her O2 saturation 89%, and asked nursing staff to change her O2 delivery.    Hospital Course:   The patient was admitted on 1/5 by Dr. Samuels.  She resides at [facility] and is a marquez of the [facility].  Her legal guardian is Margarito Steel.  She has problems with dementia.  She presented with vomiting and had a CT scan suggestive of a small bowel obstruction.  She was admitted for further work-up and treatment.     Small bowel obstruction.  She was evaluated by general surgery; Dr. Gabriel.  She had an NG tube placed.  She slowly had return of bowel function.  She has recently been cleared for soft, ground foods with  "nectar thick liquid.  She is not eating or drinking well despite this.     Acute on chronic renal failure stage IIIb.  When she first presented, her serum creatinine was 3.40 and went up slightly to 4.01.    However, she has had further deterioration of renal function since that point in time and again reached a high serum creatinine of 4.49 on 1/11.    Nephrology consult.  She has been receiving hydration and her serum creatinine is 1.96 today.     Demented marquez of the [facility].  Palliative care is following. Recent notes from [facility] reviewed.   Dialysis was considered at one point, but Dr. Gunn and Dr. Napoles feel that this is not a good option for the patient given her dementia, advanced age, and other comorbidities. Dr. Gunn requested that the patient's guardian make her a no CPR and comfort measures only. He declined this. Apparently, this issue is going in front of a  soon.     SCDs for DVT prophylaxis.     Leukocytosis.  We will follow.     Hypertension.  Norvasc.  Coreg . labetalol as needed.     COPD.  DuoNebs 4 times daily.  Patient is on room air.     Anemia.  Hemoglobin stable.  No sign of acute bleed.     Nausea.  Zofran as needed.     Wound.  Silvadene cream.  Wound care.     Nutrition.  Regular/house diet/ground.  Boost supplement.    Vital signs stable, afebrile.  Plan to discharge patient back to nursing home.  We will verify with nephrology.  Kidney function stable.    Physical Exam on Discharge:  /82 (BP Location: Right arm, Patient Position: Lying)   Pulse 86   Temp 97.6 °F (36.4 °C) (Axillary)   Resp 16   Ht 175.3 cm (69\")   Wt 54.4 kg (120 lb)   SpO2 99%   BMI 17.72 kg/m²   Physical Exam  Vitals and nursing note reviewed.   Constitutional:       Comments: Advanced age.  Cachectic.  Chronically ill.   HENT:      Head: Normocephalic.   Eyes:      Conjunctiva/sclera: Conjunctivae normal.      Pupils: Pupils are equal, round, and reactive to light.   Neck:      Vascular: " No JVD.   Cardiovascular:      Rate and Rhythm: Normal rate and regular rhythm.      Heart sounds: Normal heart sounds.   Pulmonary:      Effort: No respiratory distress.      Breath sounds: No wheezing or rales.      Comments: On room air.  Diminished breath sound bilateral, clear .  Chest:      Chest wall: No tenderness.   Abdominal:      General: Bowel sounds are normal. There is no distension.      Palpations: Abdomen is soft.      Tenderness: There is no abdominal tenderness.   Musculoskeletal:         General: No tenderness or deformity.      Cervical back: Neck supple.   Skin:     General: Skin is warm and dry.      Capillary Refill: Capillary refill takes 2 to 3 seconds.      Findings: No rash.   Neurological:      Cranial Nerves: No cranial nerve deficit.      Motor: Weakness present. No abnormal muscle tone.      Coordination: Coordination abnormal.      Gait: Gait abnormal.      Deep Tendon Reflexes: Reflexes normal.            Condition on Discharge: Stable.    Discharge Disposition: Discharge back to nursing home.  Home or Self Care    Discharge Medications:     Discharge Medications      New Medications      Instructions Start Date   albuterol (5 MG/ML) 0.5% nebulizer solution  Commonly known as: PROVENTIL   1.25 mg, Nebulization, 4 Times Daily - RT      amLODIPine 10 MG tablet  Commonly known as: NORVASC   10 mg, Oral, Every 24 Hours Scheduled   Start Date: January 17, 2023     ipratropium 0.02 % nebulizer solution  Commonly known as: ATROVENT   0.5 mg, Nebulization, 4 Times Daily - RT      sodium chloride 0.65 % nasal spray   2 sprays, Nasal, As Needed         Changes to Medications      Instructions Start Date   carvedilol 25 MG tablet  Commonly known as: COREG  What changed:   · medication strength  · how much to take   25 mg, Oral, 2 Times Daily With Meals      silver sulfadiazine 1 % cream  Commonly known as: SILVADENE, SSD  What changed:   · when to take this  · reasons to take this   Topical,  Every 12 Hours PRN         Continue These Medications      Instructions Start Date   acetaminophen 325 MG tablet  Commonly known as: TYLENOL   650 mg, Oral, Every 6 Hours PRN      cholecalciferol 1.25 MG (11159 UT) capsule  Commonly known as: VITAMIN D3   50,000 Units, Oral, Weekly, One time a day every Tue for supplement         Stop These Medications    ARIPiprazole 5 MG tablet  Commonly known as: ABILIFY     cloNIDine 0.1 MG tablet  Commonly known as: CATAPRES     furosemide 40 MG tablet  Commonly known as: LASIX     hydrALAZINE 50 MG tablet  Commonly known as: APRESOLINE     PARoxetine 10 MG tablet  Commonly known as: PAXIL     potassium chloride 10 MEQ CR capsule  Commonly known as: MICRO-K        ASK your doctor about these medications      Instructions Start Date   isosorbide dinitrate 20 MG tablet  Commonly known as: ISORDIL   20 mg, Oral, Every 8 Hours Scheduled             Discharge Diet:   Diet Instructions     Advance Diet As Tolerated            Activity at Discharge:   Activity Instructions     Activity as Tolerated            Follow-up Appointments:   Follow-up with nursing home physician as soon as able.    Electronically signed by Claudio Cobb MD, 01/16/23, 10:44 CST.    Time: Greater than 30 minutes.

## 2023-01-16 NOTE — PLAN OF CARE
"Goal Outcome Evaluation:              Outcome Evaluation: VSS, on RA, incont of urine, ask for \"red milk\" often, milk thickened, HR S 61-87 with BBB and PVC, bed alarm in use  "

## 2023-01-16 NOTE — CASE MANAGEMENT/SOCIAL WORK
Continued Stay Note   Bernadine     Patient Name: Susana Delcid  MRN: [id number]  Today's Date: 1/16/2023    Admit Date: 1/5/2023    Plan: [facility]   Discharge Plan     Row Name 01/16/23 1014       Plan    Plan [facility]    Plan Comments Following for return to [facility]. Patient continues to have bed hold.             SHERIN Mansfield

## 2023-01-16 NOTE — PROGRESS NOTES
Nephrology ([facility]) Progress Note      Patient:  Susana Delcid  YOB: 1940  Date of Service: 1/16/2023  MRN: [id number]   Acct: [id number]   Primary Care Physician: Provider, No Known  Advance Directive:   Code Status and Medical Interventions:   Ordered at: 01/05/23 5670     Level Of Support Discussed With:    Patient     Code Status (Patient has no pulse and is not breathing):    CPR (Attempt to Resuscitate)     Medical Interventions (Patient has pulse or is breathing):    Full Support     Admit Date: 1/5/2023       Hospital Day: 11  Referring Provider: Dony Samuels,       Patient personally seen and examined.  Complete chart including Consults, Notes, Operative Reports, Labs, Cardiology, and Radiology studies reviewed as able.        Subjective:  Susana Delcid is a 82 y.o. female for whom we were consulted for evaluation and treatment of acute kidney injury, hypernatremia, hypokalemia.  History of dementia.  Resides at [facility].  Brought to ER with nausea and vomiting on 1/05.  Imaging revealed small bowel obstruction and pneumonia. Creatinine was 3.4. Hospital course remarkable for some improvement of renal function and for some fluctuation of sodium and potassium levels. Sodium improving with change to hypotonic IV fluids. Renal function has made steady improvement over the weekend    Today is awake. No distress. No new overnight issues.    Allergies:  Aspirin, Codeine, and Motrin [ibuprofen]    Home Meds:  Medications Prior to Admission   Medication Sig Dispense Refill Last Dose   • acetaminophen (TYLENOL) 325 MG tablet Take 650 mg by mouth Every 6 (Six) Hours As Needed for Mild Pain .   Past Week   • ARIPiprazole (ABILIFY) 5 MG tablet Take 5 mg by mouth Daily.   1/5/2023   • carvedilol (COREG) 12.5 MG tablet Take 12.5 mg by mouth 2 (Two) Times a Day With Meals.   1/5/2023   • cholecalciferol (VITAMIN D3) 1.25 MG (77186 UT) capsule Take 50,000 Units  by mouth 1 (One) Time Per Week. One time a day every Tue for supplement   Past Week   • cloNIDine (CATAPRES) 0.1 MG tablet Take 0.1 mg by mouth Daily As Needed for High Blood Pressure. 1 tablet every 24 hours as needed for high BP for SBP greater than 180, DBP greater than 90   Past Month   • furosemide (LASIX) 40 MG tablet Take 40 mg by mouth Daily.   1/5/2023   • hydrALAZINE (APRESOLINE) 50 MG tablet Take 1 tablet by mouth Every 8 (Eight) Hours.   1/5/2023   • isosorbide dinitrate (ISORDIL) 20 MG tablet Take 1 tablet by mouth Every 8 (Eight) Hours.   1/5/2023   • potassium chloride (MICRO-K) 10 MEQ CR capsule Take 4 capsules by mouth Daily.   1/5/2023   • PARoxetine (PAXIL) 10 MG tablet Take 10 mg by mouth Every Morning.   Unknown       Medicines:  Current Facility-Administered Medications   Medication Dose Route Frequency Provider Last Rate Last Admin   • acetaminophen (TYLENOL) tablet 650 mg  650 mg Oral Q4H PRN Dony Samuels DO       • albuterol (PROVENTIL) nebulizer solution 0.5% 2.5 mg/0.5mL  1.25 mg Nebulization 4x Daily - RT Dony Samuels DO   1.25 mg at 01/16/23 1031    And   • ipratropium (ATROVENT) nebulizer solution 0.5 mg  0.5 mg Nebulization 4x Daily - RT Dony Samuels DO   0.5 mg at 01/16/23 1031   • amLODIPine (NORVASC) tablet 10 mg  10 mg Oral Q24H Parish Hope DO   10 mg at 01/16/23 0940   • carvedilol (COREG) tablet 25 mg  25 mg Oral BID With Meals Parish Hope DO   25 mg at 01/16/23 0940   • labetalol (NORMODYNE,TRANDATE) injection 10 mg  10 mg Intravenous Q6H PRN Parish Hope DO   10 mg at 01/15/23 0500   • ondansetron (ZOFRAN) injection 4 mg  4 mg Intravenous Q6H PRN Dony Samuels DO       • silver sulfadiazine (SILVADENE, SSD) 1 % cream   Topical Q12H PRN Dony Samuels DO       • sodium chloride 0.9 % flush 10 mL  10 mL Intravenous Q12H Dony Samuels DO   10 mL at 01/15/23 231   • sodium chloride 0.9 % flush 10 mL  10 mL Intravenous PRN Arvind,  Dony Ann DO       • sodium chloride 0.9 % infusion 40 mL  40 mL Intravenous Dony Saavedra DO       • sodium chloride 0.9 % infusion  75 mL/hr Intravenous Continuous Parish Hope DO 75 mL/hr at 01/15/23 1905 75 mL/hr at 01/15/23 1905   • sodium chloride nasal spray 2 spray  2 spray Each Nare Claudio Fuentes MD   2 spray at 01/08/23 1634       Past Medical History:  Past Medical History:   Diagnosis Date   • Hyperlipidemia    • Hypertension        Past Surgical History:  Past Surgical History:   Procedure Laterality Date   • APPENDECTOMY     • HYSTERECTOMY     • TONSILLECTOMY     • WRIST SURGERY Right        Family History  History reviewed. No pertinent family history.    Social History  Social History     Socioeconomic History   • Marital status:    Tobacco Use   • Smoking status: Never   • Smokeless tobacco: Never   Substance and Sexual Activity   • Alcohol use: No   • Drug use: No   • Sexual activity: Defer       Review of Systems:  History obtained from chart review and the patient  General ROS: No fever or chills  Respiratory ROS: No cough, shortness of breath, wheezing  Cardiovascular ROS: No chest pain or palpitations  Gastrointestinal ROS: No abdominal pain or melena  Genito-Urinary ROS: No dysuria or hematuria  Psych ROS: No anxiety and depression  14 point ROS reviewed with the patient and negative except as noted above and in the HPI unless unable to obtain.    Objective:  Patient Vitals for the past 24 hrs:   BP Temp Temp src Pulse Resp SpO2 Weight   01/16/23 1031 -- -- -- 86 16 99 % --   01/16/23 0738 165/82 97.6 °F (36.4 °C) Axillary 73 16 91 % --   01/16/23 0610 162/77 97.4 °F (36.3 °C) Axillary 68 16 93 % --   01/16/23 0606 -- -- -- 70 16 94 % --   01/15/23 2344 153/68 97.6 °F (36.4 °C) Axillary 71 16 94 % --   01/15/23 2158 -- -- -- 71 22 97 % --   01/15/23 2153 -- -- -- 68 22 98 % --   01/15/23 2112 112/54 97.4 °F (36.3 °C) Axillary 70 16 97 % 54.4 kg (120 lb)   01/15/23  1851 157/60 97.6 °F (36.4 °C) Axillary 92 17 96 % --       Intake/Output Summary (Last 24 hours) at 1/16/2023 1101  Last data filed at 1/16/2023 0900  Gross per 24 hour   Intake 1080 ml   Output --   Net 1080 ml     General: awake, oriented X 1  Chest:  clear to auscultation bilaterally without respiratory distress  CVS: regular rate and rhythm  Abdominal: soft, nontender, positive bowel sounds  Extremities: no cyanosis or edema  Skin: warm and dry without rash      Labs:  Results from last 7 days   Lab Units 01/14/23  0505 01/12/23  0754 01/11/23  1330   WBC 10*3/mm3 11.47* 10.48 10.38   HEMOGLOBIN g/dL 11.4* 11.8* 11.6*   HEMATOCRIT % 36.3 37.4 39.1   PLATELETS 10*3/mm3 199 250 247         Results from last 7 days   Lab Units 01/15/23  1427 01/14/23  0505 01/13/23  1049 01/11/23  1330 01/11/23  0504   SODIUM mmol/L 144 139 135*   < > 149*   POTASSIUM mmol/L 3.9 3.9 3.5   < > 3.8   CHLORIDE mmol/L 104 95* 90*   < > 96*   CO2 mmol/L 29.0 31.0* 36.0*   < > 38.0*   BUN mg/dL 55* 75* 85*   < > 69*   CREATININE mg/dL 1.96* 3.28* 4.12*   < > 3.89*   CALCIUM mg/dL 7.9* 8.5* 7.8*   < > 8.7   EGFR mL/min/1.73 25.3* 13.6* 10.4*   < > 11.1*   BILIRUBIN mg/dL  --  0.2 0.2  --  0.3   ALK PHOS U/L  --  115 102  --  94   ALT (SGPT) U/L  --  38* 24  --  39*   AST (SGOT) U/L  --  41* 21  --  40*   GLUCOSE mg/dL 99 105* 154*   < > 103*    < > = values in this interval not displayed.       Radiology:   Imaging Results (Last 72 Hours)     ** No results found for the last 72 hours. **          Culture:  Blood Culture   Date Value Ref Range Status   01/05/2023 No growth at 4 days  Preliminary   01/05/2023 No growth at 4 days  Preliminary         Assessment   1.  Acute kidney injury, ATN--improving  2.  Hypernatremia--resolved  3.  Hypokalemia--resolved  4.  Small bowel obstruction  5.  LLL pneumonia  6.  Combined systolic/diastolic CHF    Plan:  1.  Renal function has been improving. AM labs still pending  2.  Dialysis no longer a  consideration but if renal function were to decline in future would not recommend dialysis due to advanced dementia, poor quality of life, and inability to tolerate treatments.        1/16/2023  11:01 CST

## 2023-01-16 NOTE — OUTREACH NOTE
Prep Survey    Flowsheet Row Responses   [facility] facility patient discharged from? [place]   Is LACE score < 7 ? No   Eligibility Not Eligible   What are the reasons patient is not eligible? [facility]   Does the patient have one of the following disease processes/diagnoses(primary or secondary)? Other   Prep survey completed? Yes          CARLOS DAMON - Registered Nurse

## 2023-01-16 NOTE — PROGRESS NOTES
[facility]iative Care Services  Progress Note  Patient Name: Susana Delcid  Date of Admission: 1/5/2023  Today's Date: 01/16/23     Code Status and Medical Interventions:   Ordered at: 01/05/23 2212     Level Of Support Discussed With:    Patient     Code Status (Patient has no pulse and is not breathing):    CPR (Attempt to Resuscitate)     Medical Interventions (Patient has pulse or is breathing):    Full Support     Subjective   Chief complaint/Reason for Referral/Visit: Follow up on Goals of Care/Advance Care Planning.    Medical record reviewed. Events noted.  No labs collected this morning available for review at this time.  Labs collected yesterday afternoon did reveal some improvement in renal function.  She is lying in bed, asleep and in no apparent distress.  No visitors present.     Documentation regarding change of code status/level of interventions completed and sent to guardian, Margarito Steel, on 1/13/2023. Awaiting decision regarding documentation however [facility] is closed today due to [place] holiday.  Discussed with attending.     Advanced Directives: Guardianship paper on file naming Margarito Steel as her guardian.     Review of Systems   Unable to perform ROS: other (disoriented and extremely hard of hearing)     Objective   Diagnostics: Reviewed      Intake/Output Summary (Last 24 hours) at 1/16/2023 0847  Last data filed at 1/16/2023 0015  Gross per 24 hour   Intake 840 ml   Output --   Net 840 ml     Current Facility-Administered Medications   Medication Dose Route Frequency Provider Last Rate Last Admin   • acetaminophen (TYLENOL) tablet 650 mg  650 mg Oral Q4H PRN Dony Samuels DO       • albuterol (PROVENTIL) nebulizer solution 0.5% 2.5 mg/0.5mL  1.25 mg Nebulization 4x Daily - RT Dony Samuels DO   1.25 mg at 01/16/23 0606    And   • ipratropium (ATROVENT) nebulizer solution 0.5 mg  0.5 mg Nebulization 4x Daily - RT Dony Samuels DO    "0.5 mg at 01/16/23 0606   • amLODIPine (NORVASC) tablet 10 mg  10 mg Oral Q24H Parish Hope DO       • carvedilol (COREG) tablet 25 mg  25 mg Oral BID With Meals Parish Hope DO   25 mg at 01/15/23 1905   • labetalol (NORMODYNE,TRANDATE) injection 10 mg  10 mg Intravenous Q6H PRN Parish Hope DO   10 mg at 01/15/23 0500   • ondansetron (ZOFRAN) injection 4 mg  4 mg Intravenous Q6H PRN Dony Samuels DO       • silver sulfadiazine (SILVADENE, SSD) 1 % cream   Topical Q12H PRN Dony Samuels DO       • sodium chloride 0.9 % flush 10 mL  10 mL Intravenous Q12H Dony Samuels DO   10 mL at 01/15/23 2312   • sodium chloride 0.9 % flush 10 mL  10 mL Intravenous PRN Dony Samuels DO       • sodium chloride 0.9 % infusion 40 mL  40 mL Intravenous PRN Dony Samuels DO       • sodium chloride 0.9 % infusion  75 mL/hr Intravenous Continuous Parish Hope DO 75 mL/hr at 01/15/23 1905 75 mL/hr at 01/15/23 1905   • sodium chloride nasal spray 2 spray  2 spray Each Nare PRN Claudio Cobb MD   2 spray at 01/08/23 1634     sodium chloride, 75 mL/hr, Last Rate: 75 mL/hr (01/15/23 1905)      •  acetaminophen  •  labetalol  •  ondansetron  •  silver sulfadiazine  •  sodium chloride  •  sodium chloride  •  sodium chloride    Assessment:  Vital Signs: /82 (BP Location: Right arm, Patient Position: Lying)   Pulse 73   Temp 97.6 °F (36.4 °C) (Axillary)   Resp 16   Ht 175.3 cm (69\")   Wt 54.4 kg (120 lb)   SpO2 91%   BMI 17.72 kg/m²     Physical Exam  Vitals and nursing note reviewed.   Constitutional:       General: She is sleeping. She is not in acute distress.     Appearance: She is underweight. She is ill-appearing.   HENT:      Head: Normocephalic and atraumatic.      Right Ear: Decreased hearing noted.      Left Ear: Decreased hearing noted.   Eyes:      General: Lids are normal.   Neck:      Vascular: No JVD.      Trachea: Trachea normal.   Cardiovascular:      Rate and Rhythm: " Normal rate.      Heart sounds: Normal heart sounds.   Pulmonary:      Effort: Pulmonary effort is normal.   Chest:      Chest wall: No deformity or swelling.   Abdominal:      General: There is no distension.      Palpations: Abdomen is soft.   Musculoskeletal:      Cervical back: Neck supple.   Skin:     General: Skin is warm and dry.   Neurological:      Comments: Asleep at time of exam.   Psychiatric:      Comments: Asleep at time of exam.     Functional status: Palliative Performance Scale Score: Performance 30% based on the following measures: Ambulation: Totally bed bound, Activity and Evidence of Disease: Unable to do any work, extensive evidence of disease, Self-Care: Total care required,  Intake: Reduced, LOC: Full, drowsy or confusion.  Nutritional status: Albumin 2.7. Body mass index is 17.72 kg/m².   Patient status: Disease state: Controlled with current treatments.    Impression/Problem List:  1. Dementia / Chronic cerebral microvascular disease - Per CT of head 2018  2. Cognitive deficiency syndrome - Per guardian   3. Severe malnutrition  4. Acute kidney injury  5. Small bowel obstruction - Resolved  6. Hypernatremia - Resolved   7. Pneumonia of LLL   8. Pleural effusion, left  9. Essential hypertension  10. Advanced age  11. Chronic combined systolic and diastolic CHF - Per echocardiogram 2018   12. Impaired hearing       Plan / Recommendations     1. Palliative Care Encounter   - Goals of care include CODE STATUS CPR with full support interventions.    - Prognosis is guarded long-term secondary to dementia/chronic remark vascular disease, LETITIA, severe malnutrition, pneumonia of LLL, pleural effusion, advanced age, SBO and other comorbidities listed above.    - Do not recommend undergoing dialysis or escalation in the care with aggressive life sustaining measures as this would not lead to overall improvement in her quality of life and could potentially result in undue prolong discomfort and  suffering.    - Documentation regarding change of code status/level of interventions completed and sent to guardian, Margarito Steel, on 1/13/2023. Awaiting decision regarding documentation however [facility] is closed today due to [employer] holiday.  Discussed with attending.     Thank you for allowing us to participate in patient's plan of care. Palliative Care Team will continue to follow patient.     Time spent:35 minutes spent reviewing medical and medication records, assessing and examining patient, discussing with family, answering questions, providing some guidance about a plan and documentation of care, and coordinating care with other healthcare members, with > 50% time spent face to face.   Electronically signed by, JAMIR Beaver, 01/16/23.

## 2023-01-16 NOTE — PROGRESS NOTES
[facility]  INPATIENT PROGRESS NOTE    Patient Name: Susana Delcid  Date of Admission: 1/5/2023  Today's Date: 01/16/23  Length of Stay: 11  Primary Care Physician: Provider, No Known    Subjective   Chief Complaint: Demented/failure to thrive.    HPI   Patient is currently on blood room air.  Blood pressure slightly high, afebrile.  Demented noted.  Discussed with Florinda, palliative care I need to do anything today because of Chaparro Beltran Guzman's holidays.  Palliative care will check on her tomorrow with the court system.  Creatinine is improving today.  Slight increase in leukocytosis.  Hemoglobin stable.    Review of Systems   Unable to assess secondary to the patient's demented .  All pertinent negatives and positives are as above. All other systems have been reviewed and are negative unless otherwise stated.     Objective    Temp:  [97.4 °F (36.3 °C)-98.4 °F (36.9 °C)] 97.4 °F (36.3 °C)  Heart Rate:  [68-92] 68  Resp:  [16-22] 16  BP: (112-162)/(54-77) 162/77  Physical Exam  Vitals and nursing note reviewed.   Constitutional:       Comments: Advanced age.  Cachectic.  Chronically ill.   HENT:      Head: Normocephalic.   Eyes:      Conjunctiva/sclera: Conjunctivae normal.      Pupils: Pupils are equal, round, and reactive to light.   Neck:      Vascular: No JVD.   Cardiovascular:      Rate and Rhythm: Normal rate and regular rhythm.      Heart sounds: Normal heart sounds.   Pulmonary:      Effort: No respiratory distress.      Breath sounds: No wheezing or rales.      Comments: On room air.  Diminished breath sound bilateral, clear .  Chest:      Chest wall: No tenderness.   Abdominal:      General: Bowel sounds are normal. There is no distension.      Palpations: Abdomen is soft.      Tenderness: There is no abdominal tenderness.   Musculoskeletal:         General: No tenderness or deformity.      Cervical back: Neck supple.   Skin:     General: Skin is warm  and dry.      Capillary Refill: Capillary refill takes 2 to 3 seconds.      Findings: No rash.   Neurological:      Cranial Nerves: No cranial nerve deficit.      Motor: Weakness present. No abnormal muscle tone.      Coordination: Coordination abnormal.      Gait: Gait abnormal.      Deep Tendon Reflexes: Reflexes normal.             Results Review:  I have reviewed the labs, radiology results, and diagnostic studies.    Laboratory Data:   Results from last 7 days   Lab Units 01/14/23  0505 01/12/23  0754 01/11/23  1330   WBC 10*3/mm3 11.47* 10.48 10.38   HEMOGLOBIN g/dL 11.4* 11.8* 11.6*   HEMATOCRIT % 36.3 37.4 39.1   PLATELETS 10*3/mm3 199 250 247        Results from last 7 days   Lab Units 01/15/23  1427 01/14/23  0505 01/13/23  1049 01/11/23  1330 01/11/23  0504   SODIUM mmol/L 144 139 135*   < > 149*   POTASSIUM mmol/L 3.9 3.9 3.5   < > 3.8   CHLORIDE mmol/L 104 95* 90*   < > 96*   CO2 mmol/L 29.0 31.0* 36.0*   < > 38.0*   BUN mg/dL 55* 75* 85*   < > 69*   CREATININE mg/dL 1.96* 3.28* 4.12*   < > 3.89*   CALCIUM mg/dL 7.9* 8.5* 7.8*   < > 8.7   BILIRUBIN mg/dL  --  0.2 0.2  --  0.3   ALK PHOS U/L  --  115 102  --  94   ALT (SGPT) U/L  --  38* 24  --  39*   AST (SGOT) U/L  --  41* 21  --  40*   GLUCOSE mg/dL 99 105* 154*   < > 103*    < > = values in this interval not displayed.       Culture Data:   No results found for: BLOODCX, URINECX, WOUNDCX, MRSACX, RESPCX, STOOLCX    Radiology Data:   Imaging Results (Last 24 Hours)     ** No results found for the last 24 hours. **          I have reviewed the patient's current medications.     Assessment/Plan   Assessment  Active Hospital Problems    Diagnosis    • **SBO (small bowel obstruction) ([facility])    • Severe malnutrition (HCC)    • Unspecified bacterial pneumonia    • Acute on chronic combined systolic and diastolic CHF (congestive heart failure) (HCC)    • Acute respiratory failure with hypoxia (HCC)    • LETITIA (acute kidney injury) (HCC)    • Stage 3b chronic  kidney disease (CKD) (HCC)    • Hyperkalemia    • Essential hypertension        Treatment Plan  The patient was admitted on 1/5 by Dr. Samuels.  She resides at [facility] and is a [facility].  Her legal guardian is Margarito Steel.  She has problems with dementia.  She presented with vomiting and had a CT scan suggestive of a small bowel obstruction.  She was admitted for further work-up and treatment.     Small bowel obstruction.  She was evaluated by general surgery; Dr. Gabriel.  She had an NG tube placed.  She slowly had return of bowel function.  She has recently been cleared for soft, ground foods with nectar thick liquid.  She is not eating or drinking well despite this.     Acute on chronic renal failure stage IIIb.  When she first presented, her serum creatinine was 3.40 and went up slightly to 4.01.    However, she has had further deterioration of renal function since that point in time and again reached a high serum creatinine of 4.49 on 1/11.    Nephrology consult.  She has been receiving hydration and her serum creatinine is 1.96 today.    Demented marquez of the [facility].  Palliative care is following. Recent notes from JAMIR Griffin reviewed.   Dialysis was considered at one point, but Dr. Gunn and Dr. Napoles feel that this is not a good option for the patient given her dementia, advanced age, and other comorbidities. Dr. Gunn requested that the patient's guardian make her a no CPR and comfort measures only. He declined this. Apparently, this issue is going in front of a  soon.    SCDs for DVT prophylaxis.    Leukocytosis.  We will follow.    Hypertension.  Norvasc.  Coreg . labetalol as needed.    COPD.  DuoNebs 4 times daily.  Patient is on room air.    Anemia.  Hemoglobin stable.  No sign of acute bleed.    Nausea.  Zofran as needed.    Wound.  Silvadene cream.  Wound care.    Nutrition.  Regular/house diet/ground.  Boost supplement.    Medical Decision  Making  Number and Complexity of problems: Presented with 1 acute illness with systemic symptoms in the form of her small bowel obstruction; resolved.  Has now had difficulties with recurrent acute kidney injury superimposed on chronic kidney disease.  Nephrology and the prior attending do not wish for her to go on dialysis and are petitioning her guardian about this.  She also has moderately complex chronic illnesses in the form of her dementia and severe malnutrition.  Differential Diagnosis: None considered at present.     Conditions and Status        Condition stable.     MDM Data  External documents reviewed: None.   Cardiac tracing (EKG, telemetry) interpretation: None recently.   Radiology interpretation: Interpreted by radiology.   Labs reviewed: As above and reviewed prior renal function.   Any tests that were considered but not ordered: None at present.      Decision rules/scores evaluated (example [id number], [place], etc): None at present.      Discussed with: her nurse, Gabriela.      Care Planning  Shared decision making: Patient unable to pariticipate. Has a state guardian; Margarito Damián.   Code status and discussions: Full with full interventions.      Disposition  Social Determinants of Health that impact treatment or disposition: Chronic SNF patient with state guardianship.   I expect the patient to be discharged to [facility] when appropriate.      Electronically signed by Claudio Cobb MD, 01/16/23, 08:09 CST.

## 2023-01-18 NOTE — THERAPY DISCHARGE NOTE
Acute Care - Speech Language Pathology Discharge Summary  [facility]       Patient Name: Susana Delcid  : 1940  MRN: [id number]    Today's Date: 2023                   Admit Date: 2023      SLP Recommendation and Plan  Mechanical soft solids and nectar thick liquids    Visit Dx:    ICD-10-CM ICD-9-CM   1. Small bowel obstruction (HCC)  K56.609 560.9   2. Decreased activities of daily living (ADL)  Z78.9 V49.89   3. Impaired mobility  Z74.09 799.89   4. Dysphagia, unspecified type  R13.10 787.20                SLP GOALS     Row Name 23 1600             (LTG) Swallow    (LTG) Swallow Pt will tolerate least restrictive diet with no overt s/s of aspiration.  -MB      [place] (Swallow Long Term Goal) with moderate cues (50-74% accuracy)  -MB      Time Frame (Swallow Long Term Goal) by discharge  -MB      Barriers (Swallow Long Term Goal) n/a  -MB      Progress/Outcomes (Swallow Long Term Goal) goal partially met  -MB      Comment (Swallow Long Term Goal) n/a  -MB         (STG) Patient will tolerate trials of    Consistencies Trialed (Tolerate trials) nectar/ mildly thick liquids  -MB      Desired Outcome (Tolerate trials) without signs/symptoms of aspiration;with adequate oral prep/transit/clearance  -MB      [place] (Tolerate trials) with moderate cues (50-74% accuracy)  -MB      Time Frame (Tolerate trials) by discharge  -MB      Progress/Outcomes (Tolerate trials) goal partially met  -MB      Comment (Tolerate trials) n/a  -MB            User Key  (r) = Recorded By, (t) = Taken By, (c) = Cosigned By    Initials Name Provider Type    Rolando Vo, CCC-SLP Speech and Language Pathologist                        SLP Discharge Summary  Anticipated Discharge Disposition (SLP): skilled nursing facility  Reason for Discharge: discharge from this facility  Progress Toward Achieving Short/long Term Goals: goals partially met within established timelines  Discharge Destination:  home      Rolando Mayes, CCC-SLP  1/18/2023

## 2023-01-19 ENCOUNTER — LAB REQUISITION (OUTPATIENT)
Dept: LAB | Facility: HOSPITAL | Age: 83
End: 2023-01-19
Payer: MEDICARE

## 2023-01-19 DIAGNOSIS — Z00.00 ENCOUNTER FOR GENERAL ADULT MEDICAL EXAMINATION WITHOUT ABNORMAL FINDINGS: ICD-10-CM

## 2023-01-19 LAB
ALBUMIN SERPL-MCNC: 2.9 G/DL (ref 3.5–5.2)
ALBUMIN/GLOB SERPL: 1 G/DL
ALP SERPL-CCNC: 104 U/L (ref 39–117)
ALT SERPL W P-5'-P-CCNC: 40 U/L (ref 1–33)
ANION GAP SERPL CALCULATED.3IONS-SCNC: 10 MMOL/L (ref 5–15)
AST SERPL-CCNC: 28 U/L (ref 1–32)
BASOPHILS # BLD AUTO: 0.07 10*3/MM3 (ref 0–0.2)
BASOPHILS NFR BLD AUTO: 0.9 % (ref 0–1.5)
BILIRUB CONJ SERPL-MCNC: <0.2 MG/DL (ref 0–0.3)
BILIRUB SERPL-MCNC: 0.3 MG/DL (ref 0–1.2)
BUN SERPL-MCNC: 37 MG/DL (ref 8–23)
BUN/CREAT SERPL: 28.9 (ref 7–25)
CALCIUM SPEC-SCNC: 8.9 MG/DL (ref 8.6–10.5)
CHLORIDE SERPL-SCNC: 106 MMOL/L (ref 98–107)
CHOLEST SERPL-MCNC: 213 MG/DL (ref 0–200)
CO2 SERPL-SCNC: 27 MMOL/L (ref 22–29)
CREAT SERPL-MCNC: 1.28 MG/DL (ref 0.57–1)
DEPRECATED RDW RBC AUTO: 49.7 FL (ref 37–54)
EGFRCR SERPLBLD CKD-EPI 2021: 42.2 ML/MIN/1.73
EOSINOPHIL # BLD AUTO: 0.12 10*3/MM3 (ref 0–0.4)
EOSINOPHIL NFR BLD AUTO: 1.6 % (ref 0.3–6.2)
ERYTHROCYTE [DISTWIDTH] IN BLOOD BY AUTOMATED COUNT: 15.5 % (ref 12.3–15.4)
GLOBULIN UR ELPH-MCNC: 2.9 GM/DL
GLUCOSE SERPL-MCNC: 83 MG/DL (ref 65–99)
HBA1C MFR BLD: 6.1 % (ref 4.8–5.6)
HCT VFR BLD AUTO: 35.7 % (ref 34–46.6)
HDLC SERPL-MCNC: 38 MG/DL (ref 40–60)
HGB BLD-MCNC: 10.8 G/DL (ref 12–15.9)
IMM GRANULOCYTES # BLD AUTO: 0.02 10*3/MM3 (ref 0–0.05)
IMM GRANULOCYTES NFR BLD AUTO: 0.3 % (ref 0–0.5)
LDLC SERPL CALC-MCNC: 146 MG/DL (ref 0–100)
LDLC/HDLC SERPL: 3.76 {RATIO}
LYMPHOCYTES # BLD AUTO: 1.64 10*3/MM3 (ref 0.7–3.1)
LYMPHOCYTES NFR BLD AUTO: 21.6 % (ref 19.6–45.3)
MCH RBC QN AUTO: 27.1 PG (ref 26.6–33)
MCHC RBC AUTO-ENTMCNC: 30.3 G/DL (ref 31.5–35.7)
MCV RBC AUTO: 89.5 FL (ref 79–97)
MONOCYTES # BLD AUTO: 0.69 10*3/MM3 (ref 0.1–0.9)
MONOCYTES NFR BLD AUTO: 9.1 % (ref 5–12)
NEUTROPHILS NFR BLD AUTO: 5.06 10*3/MM3 (ref 1.7–7)
NEUTROPHILS NFR BLD AUTO: 66.5 % (ref 42.7–76)
NRBC BLD AUTO-RTO: 0 /100 WBC (ref 0–0.2)
PLATELET # BLD AUTO: 274 10*3/MM3 (ref 140–450)
PMV BLD AUTO: 11.7 FL (ref 6–12)
POTASSIUM SERPL-SCNC: 4.9 MMOL/L (ref 3.5–5.2)
PROT SERPL-MCNC: 5.8 G/DL (ref 6–8.5)
RBC # BLD AUTO: 3.99 10*6/MM3 (ref 3.77–5.28)
SODIUM SERPL-SCNC: 143 MMOL/L (ref 136–145)
T4 FREE SERPL-MCNC: 1.2 NG/DL (ref 0.93–1.7)
TRIGL SERPL-MCNC: 160 MG/DL (ref 0–150)
TSH SERPL DL<=0.05 MIU/L-ACNC: 2.43 UIU/ML (ref 0.27–4.2)
VLDLC SERPL-MCNC: 29 MG/DL (ref 5–40)
WBC NRBC COR # BLD: 7.6 10*3/MM3 (ref 3.4–10.8)

## 2023-01-19 PROCEDURE — 83036 HEMOGLOBIN GLYCOSYLATED A1C: CPT | Performed by: NURSE PRACTITIONER

## 2023-01-19 PROCEDURE — 85025 COMPLETE CBC W/AUTO DIFF WBC: CPT | Performed by: NURSE PRACTITIONER

## 2023-01-19 PROCEDURE — 82248 BILIRUBIN DIRECT: CPT | Performed by: NURSE PRACTITIONER

## 2023-01-19 PROCEDURE — 84439 ASSAY OF FREE THYROXINE: CPT | Performed by: NURSE PRACTITIONER

## 2023-01-19 PROCEDURE — 82306 VITAMIN D 25 HYDROXY: CPT | Performed by: NURSE PRACTITIONER

## 2023-01-19 PROCEDURE — 84481 FREE ASSAY (FT-3): CPT | Performed by: NURSE PRACTITIONER

## 2023-01-19 PROCEDURE — 84134 ASSAY OF PREALBUMIN: CPT | Performed by: NURSE PRACTITIONER

## 2023-01-19 PROCEDURE — 80053 COMPREHEN METABOLIC PANEL: CPT | Performed by: NURSE PRACTITIONER

## 2023-01-19 PROCEDURE — 84443 ASSAY THYROID STIM HORMONE: CPT | Performed by: NURSE PRACTITIONER

## 2023-01-19 PROCEDURE — 36415 COLL VENOUS BLD VENIPUNCTURE: CPT | Performed by: NURSE PRACTITIONER

## 2023-01-19 PROCEDURE — 82607 VITAMIN B-12: CPT | Performed by: NURSE PRACTITIONER

## 2023-01-19 PROCEDURE — 80061 LIPID PANEL: CPT | Performed by: NURSE PRACTITIONER

## 2023-01-20 LAB
25(OH)D3 SERPL-MCNC: 41.2 NG/ML (ref 30–100)
PREALB SERPL-MCNC: 20.3 MG/DL (ref 20–40)
T3FREE SERPL-MCNC: 1.9 PG/ML (ref 2–4.4)
VIT B12 BLD-MCNC: 1224 PG/ML (ref 211–946)

## 2024-01-29 NOTE — PROGRESS NOTES
LOS: 2 days   Patient Care Team:  Provider, No Known as PCP - General    Chief Complaint: Small bowel obstruction Hospital day #2  Subjective     Subjective     Small bowel obstruction Hospital day #2, by report she had a small bowel movement, also passed gas, output from the NG tube is been less, 600 out yesterday, compared to 1800 the first day.  Objective      Objective     Vital Signs  Temp:  [97.6 °F (36.4 °C)-98 °F (36.7 °C)] 98 °F (36.7 °C)  Heart Rate:  [] 84  Resp:  [16-22] 17  BP: (109-172)/(62-88) 161/88    Intake & Output (last 3 days)       01/04 0701 01/05 0700 01/05 0701 01/06 0700 01/06 0701 01/07 0700 01/07 0701 01/08 0700    Urine (mL/kg/hr)   0 (0)     Emesis/NG output   1800 600    Stool   0     Total Output   1800 600    Net   -1800 -600            Urine Unmeasured Occurrence   8 x     Stool Unmeasured Occurrence   2 x           Physical Exam:     General Appearance:    Alert, cooperative, in no acute distress   Lungs:     Clear to auscultation,respirations regular, even and                  unlabored    Heart:    Regular rhythm and normal rate, normal S1 and S2, no            murmur, no gallop, no rub, no click   Chest Wall:    No abnormalities observed   Abdomen:    Less distended abdomen, occasional bowel sounds, white count is elevated to 16, reduced creatinine down to 2.8, and a BUN reduced to 91.  KUB is pending.        Results Review:     I reviewed the patient's new clinical results.  I reviewed the patient's new imaging results and agree with the interpretation.    Results from last 7 days   Lab Units 01/07/23  0444 01/06/23  0904 01/05/23  1720   WBC 10*3/mm3 16.14* 11.53* 8.09   HEMOGLOBIN g/dL 13.3 14.7 14.9   HEMATOCRIT % 44.8 47.1* 49.1*   PLATELETS 10*3/mm3 306 354 422        Results from last 7 days   Lab Units 01/07/23  0444 01/06/23  1253 01/06/23  0904 01/05/23  1720   SODIUM mmol/L 152* 142 140 142   POTASSIUM mmol/L 4.0 4.4 5.7* 5.7*   CHLORIDE mmol/L 105 98  Medical Weight Loss  Consultation    Assessment/Plan:   Pure hypercholesterolemia  Medication not currently indicated. Will monitor for improvement over the course of weight loss therapy. Would benefit from weight loss to address this issue. A low carbohydrate diet would be beneficial for this as well.    Class 2 severe obesity due to excess calories with serious comorbidity and body mass index (BMI) of 35.0 to 35.9 in adult (CMD)  Uncontrolled. Patient would benefit from a diet controlled in carbohydrates, and improved exercise. She is motivated to make changes and the plan is as outlined below    Pre-clinic labs were discussed with patient in detail. Patient expresses understand with no further questions at this time.    Chronic venous insufficiency  Ongoing.  Will monitor for improvement over the course of weight loss therapy. Would benefit from weight loss to address this issue. A low carbohydrate diet would be beneficial for this as well.    GLENIS (obstructive sleep apnea)  Stable.  Continue management through pulmonology. Patient was encouraged to wear CPAP as prescribed.    Gastroesophageal reflux disease without esophagitis  Ongoing. Continue same. We'll monitor for exacerbation of symptoms over the course of weight loss therapy as changes in diet can lead to issues with control over symptoms.    Chronic migraine w/o aura w/o status migrainosus, not intractable  Stable.  We'll monitor for change in migraine frequency and quality, as dietary changes can affect migraine status.     Anxiety and depression  Currently at goal. Continue with current medications. We'll monitor for emotional eating, and refer to behavioral health if needed.    Fibromyalgia  Ongoing. Continue with current medications and specialty follow up.     During this visit Sameera and MANNY reviewed her history and discussed the medical weight loss program at [facility]. We discussed what our multi-disciplinary team program entailed  94* 96*   CO2 mmol/L 34.0* 33.0* 32.0* 30.0*   BUN mg/dL 91* 96* 100* 83*   CREATININE mg/dL 2.87* 3.71* 4.01* 3.40*   CALCIUM mg/dL 9.1 9.5 10.1 11.2*   BILIRUBIN mg/dL 0.3  --  0.3 0.4   ALK PHOS U/L 67  --  82 84   ALT (SGPT) U/L 15  --  21 14   AST (SGOT) U/L 17  --  22 18   GLUCOSE mg/dL 121* 151* 172* 170*       Assessment/Plan     Assessment & Plan       SBO (small bowel obstruction) (HCC)    Essential hypertension    LETITIA (acute kidney injury) (HCC)    Stage 3b chronic kidney disease (CKD) (HCC)    Left lower lobe pneumonia    Hyperkalemia    Chronic combined systolic (congestive) and diastolic (congestive) heart failure (HCC)    Patient with small obstruction seems to be improving we will give another bolus of lactated Ringer's BUN/creatinine seems to be improving, good sign that flatus and bowel movements are occurring, do not know the status of her small bowel extraction as no KUB is not been completed and it is 1125 at present.  We will reorder the KUB also reorder a KUB for tomorrow if this is looking improved we will look toward a small bowel follow-through to be completed on Sunday.      Tam Gabriel MD  01/07/23  11:24 CST      Time: time spent with patient 15 minutes     Part of this note may be an electronic transcription/translation of spoken language to printed text using the Dragon Dictation System.   and goals for exercise physiology, dietary services, behavior modification.  We discussed the different appointments all patients could attend, including additional appointments she will need.   Starting today, she is going to begin a prescribed diet plan which includes a 10 g per meal carbohydrate restriction. Carbohydrate intake should not exceed 30 g per day. Exercise program will start at 10,000 steps and/or 150 minutes of exercise weekly. Can break this down into 30 minutes 5 days a week, or into smaller segments as tolerated.  The low-carb eating/living handout was reviewed during the office visit and all questions were answered to her satisfaction.     Discussed referral to behavioral health, dietitians, exercise trainers, and pulmonary medicine (evaluation of sleep apnea)    We discussed realistic goals for weight loss and Sameera has realistic goals for what she would like to achieve. We discussed the importance of a commitment to lifelong lifestyle changes to achieve success with weight loss long term.     Sameera is certainly a good candidate for a weight loss program with a BMI of Body mass index is 35.75 kg/m²..  She has many weight related medical conditions and is very motivated to improve her health. I believe She should enter our program and we will monitor her course through the process.    Patient Instructions   You may receive a brief 10 question survey after the completion of today's visit, please complete this survey to let us know where we are doing well and meeting your needs as our patient, but also any areas of improvement! Thank you!    Goals for Weight Loss:    Decrease Sugars and Sweets:  Limit carbohydrates to 10 grams per meal.  Avoid artificial sweeteners, as they can slow weight loss.      Your body can only process 12 g of carbohydrate at a time, and the rest is stored, making you gain weight.  Eat more protein - meats, eggs, cheese - to stay full. Remember, fruits and vegetables can be  high in carbohydrates  Read the nutrition labels on food and pay attention to portion sizes.      Weight loss:  4-8 pounds in 1 month.    Goal BMI is <30, yours is currently Body mass index is 35.75 kg/m².    Drink plenty of water  Eat more protein - meats, seafood, cheese, eggs  Eating fats are fine - oils, whipped cream, rusty cheese dressing  Fruits and Vegetables can be high in carbohydrates  NO juices, sweet teas, NO ARTIFICIAL SWEETENERS  NO starches - breads, pasta, rice, pizza, potatoes  “Low fat” products usually are high in carbohydrates - watch those labels  READ NUTRITION LABELS  Google EVERYTHING you eat:  “how many grams of carbs in _____”  Add broth daily    Keep a journal:  Write down your foods and beverages before you eat.  Make sure to add in your exercise as above. May use the [employer] book for carbohydrate counting.      Exercise:   10,000 Steps Daily  150 minutes of exercise weekly. Can break this down into 30 minutes 5 days a week, or into smaller segments as tolerated.    Ultimate goal is 10,000 steps daily. Use pedometer to count your steps. If you are not achieving the 10,000 steps today, increase your steps gradually so that you can achieve that goal. Look at your average number of steps over the course of 1 week. Increase your goal by 1000 steps daily for 2 weeks, and continue to do this until you achieve the 10,000 steps daily goal.  Try to stand rather than sit as much as possible.        Alcohol:  No more than 2 standard drinks per day or 7 standard drinks per week. These are empty calories, with no nutritional benefit. Your body burns alcohol before fat and will slow your weight loss.    Tobacco:  You are currently tobacco free. Keep it up!    Websites:  www.[employer]  http://lowcarbdiets.Factabase.com/od/atkinsdiet/a/atkinsfoodlists.htm  www.[employer]    https://recipes.[employer]/great-recipes.asp?food=atkins+induction  Apps for Low Carbohydrate eating:   Diet  Logbooks  Joeclyn Carb Tracker  Carb Manager  Low Carb Diet Assistant   Exercise and Diet  Calorie Counter & Diet Tracker by Bertha Patel  My Fitness Pal  Glycemic Index:  Low GI Diet  Low GI Diet Tracker  Recipes:   Jocelyn Carb Tracker   Ditchthecarbs.com   Lowcarbyum.com   Atkins.com   Ibreatheimhungry.com   EatingPerfect Price.MetaCarta   Food.com   *Search for recipes with <10 grams of carbohydrate*  Recipe Builders:  Recipe Builder Pro    Return in about 3 months (around 4/30/2024) for Follow up Weight Management.     As a friendly reminder to all our patients, the Endocrinology department respects all our patients' time and we do our best to see you at your scheduled appointment time. Therefore, please arrive 15 minutes prior to your appointment in order to complete check-in and the rooming process. Please also note, if you are more than 5 minutes late for your appointment, we may ask that your appointment be rescheduled.     Thank you to SHERRY Fernandez for allowing me the opportunity to assist in the care of this patient.  If you have any questions please feel free to contact me.    A copy of this note was sent to:  Zulma Hayes AP* Jessica Kozel DNP, SHERRY, FNP-BC    Total time spent today on this visit  is  60 minutes which includes  preparing to see the patient by reviewing prior records, obtaining and reviewing history, performing a physical exam, counseling the patient ,documenting clinical information in the medical record,ordering medications/tests/procedures, communicating test results to the patient, and coordinating care    CHIEF COMPLAINT:   This is a 65 year old White  female seen for consultation at the request of Zulma Hayes AP* for weight management due to her problems which include The primary encounter diagnosis was Pure hypercholesterolemia. Diagnoses of Class 2 severe obesity due to excess calories with serious comorbidity and body mass index (BMI) of 35.0 to 35.9 in  adult (CMD), Chronic venous insufficiency, GLENIS (obstructive sleep apnea), Gastroesophageal reflux disease without esophagitis, Chronic migraine w/o aura w/o status migrainosus, not intractable, Anxiety and depression, and Fibromyalgia were also pertinent to this visit.    HISTORY OF PRESENT ILLNESS:     Sameera Díaz patient is here to discuss her entry into the medical weight loss program.   Sameera states she has been overweight \"since just after high school\" and obese for over 27 years, after her 4th child. She has seen her weight increase and previous attempts at weight loss in the past have failed. She has seen occasional, but short lived, success in other types of programs because the weight previously lost has always been regained over time. She has become frustrated by her weight fluctuating with other attempts at weight loss and is seeking a more comprehensive program to help her long term health.    Previous attempts at weight loss have included [employer] and Weight Watchers. She has not previously participated in a physician directed weight loss program.    She currently suffers from weight related medical conditions including:    Hyperlipidemia for which she is not currently on any medication.   The 10-year ASCVD risk score (Ashlyn DK, et al., 2019) is: 4.5%    Values used to calculate the score:      Age: 65 years      Sex: Female      Is Non- : No      Diabetic: No      Tobacco smoker: No      Systolic Blood Pressure: 124 mmHg      Is BP treated: No      HDL Cholesterol: 88 mg/dL      Total Cholesterol: 269 mg/dL'    Chronic venous insufficiency: primary symptom is bilateral swelling and aching. Does not wear compression socks routinely. Follows with primary care for management.     Obstructive sleep apnea: Diagnosed in 2024. Currently following with pulmonology for management. Wears CPAP nightly as prescribed    Gastroesophageal reflux disease, currently on pantoprazole 40 mg twice  daily, Gaviscon as needed, and famotidine 40 mg nightly.  Has breakthrough symptoms \"constantly\".  Trigger foods include carbonated beverages, rich foods, spicy foods, and greasy foods.  Follows with gastroenterology for management.     Migraine headaches: Not currently on any medication prophylaxis with rizatriptan or Excedrin migraine used during a migraine. she is getting 3 headaches per month. Common triggers include neck pain. Follows with primary care for management.     Anxiety/Depression: Currently taking clonazepam as needed. Reports that mood is currently \"a little stressed but ok\". Follows with primary care for management. Denies any suicidal ideation.     She also has limitations on physical activity due to her weight and Fibromyalgia. Main pain generators include neck, back, feet, and hips. Currently taking tizanidine or tramadol as needed. Follows with pain management.     She denies having any previous surgical weight loss procedures.     Barriers to weight loss include:  Stress  Lack of sleep   Appetite- especially when on anti-fungal medication     Weight Lost:  70 pounds    Weight Regained  20 pounds    Previous weight loss medications and supplements include:Topiramate (Topamax) was on migraines previously but had side effects, see allergies.   Over the counter diet aids include: None    Current Exercise:  Swimming 3-4 times per week, yoga and stretching      Current Diet:  General       HISTORIES:   History was reviewed by provider and updated as needed.  Past Medical History:   Diagnosis Date    Anxiety     Benign essential tremor 05/03/2016    BPV (benign positional vertigo)     Cholelithiasis 05/12/2016    Gallbladder Sludge    Chronic low back pain     Chronic sinusitis     Chronic venous insufficiency     DDD (degenerative disc disease), cervical 01/2007    DDD (degenerative disc disease), lumbosacral     DJD (degenerative joint disease) of cervical spine 01/2007    Endocervical polyp      Fibromyalgia     GERD (gastroesophageal reflux disease)     Hiatal hernia     IBS (irritable bowel syndrome)     Left knee pain     Memory impairment 04/06/2017    Migraines     Motion sickness     Nuclear sclerosis of both eyes 02/21/2021    GLENIS (obstructive sleep apnea)     Osteoarthritis 05/30/2018    Left knee    Plantar fasciitis     PONV (postoperative nausea and vomiting)     Post laminectomy syndrome     Primary insomnia 05/29/2018    Pure hypercholesterolemia 06/04/2016    RLS (restless legs syndrome)     Sacroiliac joint dysfunction     Trochanteric bursitis     Uterine fibroid        Past Surgical History:   Procedure Laterality Date    Breast cyst aspiration      Cervical discectomy  09/25/2007    C5-C6 anterior cervical diskectomy with placement of Medtronic prestige artificial disk    Cervical laminectomy      Colonoscopy  07/06/2010    Colonoscopy  05/29/2020    repeat in 10 years hyperplastic polyp    Egd  03/24/2022    Egd dilation of duod w/baloon  06/28/2021    Endometrial biopsy  05/09/2016    postmenopausal bleeding- benign results    Laparoscopic cholecystectomy  06/09/2016    Robotic assisted    Neck surgery  09/01/2007    Metal disc    Reconstruction of nose      Sinus surgery      Spinal cord stimulator implant  09/02/2022    Abbott 2 leads T7    Thoracic discectomy      Tubal ligation         Family History   Problem Relation Age of Onset    Hyperlipidemia Mother     Osteoarthritis Mother     Other Mother         Aortic stenosis. S/P Valve replacement    Cancer, Prostate Father     Heart disease Father         Heart valves    Peripheral Vascular Disease Father     Aneurysm Father         Aortic    Kidney disease Father         Dialysis    COPD Father     Other Father         Sepsis-cause of death    Depression Sister     Anxiety disorder Sister     Sleep Apnea Sister     Atrial Fibrilliation Sister     Thyroid Sister         Autoimmune hepatitis    Obesity Sister     Depression Sister      Colon Polyps Sister     Cancer Brother         Throat-metastatic to liver and spine    Osteoarthritis Brother     Sleep Apnea Brother     Depression Daughter     Anxiety disorder Daughter     Suicide Son     Aneurysm Paternal Uncle         Aortic    Dementia/Alzheimers Paternal Uncle     Cancer, Colon Maternal Grandmother     Heart disease Maternal Grandfather     Cancer, Colon Paternal Grandmother     Natural Causes Paternal Grandfather     Diabetes Other         Cousins        Social History     Tobacco Use    Smoking status: Never    Smokeless tobacco: Never   Vaping Use    Vaping Use: never used   Substance Use Topics    Alcohol use: Never     Comment: On an occasionally Drink    Drug use: Never        Current Outpatient Medications   Medication Sig    clonazePAM (KlonoPIN) 0.5 MG tablet TAKE 1/2 TABLET BY MOUTH EVERY NIGHT AS NEEDED FOR ANXIETY    famotidine (PEPCID) 20 MG tablet TAKE 2 TABLETS BY MOUTH AT BEDTIME    traMADol (ULTRAM) 50 MG tablet TAKE 1 TABLET BY MOUTH DAILY AS NEEDED FOR PAIN    ZINC SULFATE PO Take 22 mg by mouth daily.    COVID-19 mRNA 12Y+, Moderna, (SPIKEVAX) 50 MCG/0.5ML Suspension Prefilled Syringe Inject 0.5 mLs into the muscle.    tiZANidine (ZANAFLEX) 2 MG tablet Take 1 tablet by mouth 3 times daily as needed for Muscle spasms.    diclofenac (VOLTAREN) 1 % gel Apply 2-4 g topically 3 times daily as needed (pain).    influenza virus quadrivalent vaccine inactivated, PRESERVATIVE FREE, (Flulaval Quadrivalent) 0.5 ML injection Inject 0.5 mLs into the muscle 1 time for 1 dose    terbinafine (LamISIL) 250 MG tablet Take 1 tablet by mouth daily.    pantoprazole (PROTONIX) 40 MG tablet TAKE 1 TABLET BY MOUTH EVERY MORNING AND EVERY EVENING WITH MEALS    MAGNESIUM OXIDE PO Take by mouth daily.    Menthol, Topical Analgesic, 4 % Gel Apply topically as needed.    diphtheria-pertussis, acellular,-tetanus (Boostrix) 5-2.5-18.5 LF-MCG/0.5 Suspension Prefilled Syringe injection Inject 0.5 mLs into  the muscle.    acetaminophen (TYLENOL) 650 MG CR tablet Take 650 mg by mouth daily as needed for Pain.    lidocaine (LIDODERM) 5 % patch Apply 1-2 patches to painful area on back.  Remove patch 12 hours after applyinaplg    rizatriptan (MAXALT-MLT) 10 MG disintegrating tablet DISSOLVE 1 TABLET ON THE TONGUE AT ONSET OF MIGRAINE. MAY REPEAT AFTER 2 HOURS AS NEEDED. NO MORE THAN 2 TABLET IN 24 HOURS OR 2 DAYS EVERY WEEK    fluticasone (FLONASE) 50 MCG/ACT nasal spray USE 2 SPRAYS IN EACH NOSTRIL DAILY. SHAKE LIQUID. (Patient taking differently: as needed.)    Alum Hydroxide-Mag Carbonate (GAVISCON PO) Take by mouth as needed.    Loratadine 10 MG Cap Take by mouth as needed.    Probiotic Product (PromoteU) Cap Take 1 capsule by mouth daily.    HYPERTONIC NASAL WASH NA [place]  nasally as needed.    aspirin-acetaminophen-caffeine (EXCEDRIN MIGRAINE) 250-250-65 MG per tablet Take 1 tablet by mouth.    SM SUPER B COMPLEX/C Tab Take 1 tablet by mouth daily.    Cholecalciferol (VITAMIN D) 2000 units capsule Take 1 capsule by mouth daily.     No current facility-administered medications for this visit.       ALLERGIES:   Allergen Reactions    Erythromycin Nausea & Vomiting    Topiramate ANXIETY, Appetite Loss, GI UPSET, HEADACHES, Palpitations and SHORTNESS OF BREATH    Oxycodone Hcl VISUAL DISTURBANCE    Hydrocodone-Acetaminophen DIZZINESS and NAUSEA     Other reaction(s): DIZZINESS  Sensitive to all pain meds and muscle relaxants.  Sensitive to all pain meds and muscle relaxants.  Sensitive to all pain meds and muscle relaxants.  Other reaction(s): DIZZINESS  Sensitive to all pain meds and muscle relaxants.       REVIEW OF SYSTEMS:   Entered and reviewed in nurse's note and I agree., Complaints are chronic in nature, and Acute abnormals as noted in HPI    PHYSICAL EXAMINATION:   VITAL SIGNS:  Visit Vitals  /76 (BP Location: RUE - Right upper extremity, Patient Position: Sitting, Cuff Size: Large Adult)    Pulse 76   Ht 5' 6\" (1.676 m)   Wt 100.5 kg (221 lb 8 oz)   BMI 35.75 kg/m²       Wt Readings from Last 4 Encounters:   01/31/24 100.5 kg (221 lb 8 oz)   12/11/23 96.9 kg (213 lb 10 oz)   11/07/23 96.9 kg (213 lb 9.6 oz)   10/31/23 90.7 kg (200 lb)     Ideal body weight: 59.3 kg (130 lb 11.7 oz)  Adjusted ideal body weight: 75.8 kg (167 lb 0.6 oz)    GENERAL: This is a 65 year old female in no acute distress  PSYCH: She is awake alert and oriented to time, place and person. Mood and affect are appropriate  HEAD: Appears to be atraumatic and normocephalic   EYES:  Pupils equal, round and reactive to light and accommodation.  Extraocular movements appear to be intact  THROAT: Dentition is good  NECK: Supple and symmetric. No lymphadenopathy. No thyromegaly or nodularity noted.   LUNGS: Reveals good effort and lungs are clear to auscultation   bilaterally. There are no wheezes or rhonchi  HEART: Reveals presence of first and second heart sounds. Regular rate and rhythm. No murmurs, rubs or gallops  ABDOMEN:  Normoactive bowel sounds. Soft and non-tender. There is no rebound tenderness.    EXTREMITIES: Moves upper and lower extremities equal and strong against resistance, 1-2+ edema in bilateral lower extremities.   NEUROLOGIC: Cranial nerves II through XII appear grossly intact. Sensation is intact  SKIN: Appears unremarkable and no rashes are present    LABS:  Lab Results   Component Value Date    SODIUM 141 12/05/2023    POTASSIUM 4.2 12/05/2023    CHLORIDE 107 12/05/2023    CO2 27 12/05/2023    GLUCOSE 89 12/05/2023    BUN 16 12/05/2023    CREATININE 1.10 (H) 12/05/2023    ALBUMIN 4.1 12/05/2023    BILIRUBIN 0.6 12/05/2023    AST 21 12/05/2023    INR 1.0 08/01/2022     Lab Results   Component Value Date    WBC 3.5 (L) 10/17/2023    HGB 11.8 (L) 10/17/2023    HCT 36.4 10/17/2023     10/17/2023    RAPDTR <0.02 05/22/2021    TSH 1.012 12/05/2023     Hemoglobin A1C (%)   Date Value   12/05/2023 5.2      Cholesterol (mg/dL)   Date Value   07/13/2023 269 (H)     HDL (mg/dL)   Date Value   07/13/2023 88     Cholesterol/ HDL Ratio (no units)   Date Value   07/13/2023 3.1     Triglycerides (mg/dL)   Date Value   07/13/2023 62     LDL (mg/dL)   Date Value   07/13/2023 169 (H)     Vitamin D, 25-Hydroxy (ng/mL)   Date Value   12/05/2023 37.9     Uric Acid (mg/dL)   Date Value   12/05/2023 4.3     Insulin, Fasting (mUnits/L)   Date Value   12/05/2023 8     Glucose (mg/dL)   Date Value   12/05/2023 89     12/5/2023 MARILEE IR 1.8

## 2024-02-09 NOTE — PLAN OF CARE
"Goal Outcome Evaluation:  Plan of Care Reviewed With: patient        Progress: no change  Outcome Evaluation: VSS.  Sinus 71-93 with BBB on tele.  No indicators of pain.  Pt aggitated by any care given, swatting when being touched and yelling, \"I don't want it!\" when nasal cannula placed on nose with decreased O2 saturation.  Patient slept all of shift, was not always easily arousable, and did not ask for anything to drink.  This is not typical behavior compared to the previous three nights.  Patient only wet one brief all shift, and it was a small amount.  She did have a moderate sized semi-formed bowel movement with the urine incontinency.  Cream applied to blanchable bottom.  Feet floated.  Turned q2h, wedges utilized; patient tolerated.  Pt was not tolerating forehead pulse ox probe and swatted at any attempt to place a probe on her finger.  Probe placed on toe with fair results.  Remains on room air, with O2 sat constantly fluctuating between 88-91.  Nasal canula placed once, not tolerated.  IV ABX infused and D5 continuous infusion continues.  Bed alarm and 3 rails utilized.  Safety maintained.  " -- DO NOT REPLY / DO NOT REPLY ALL --  -- Message is from Engagement Center Operations ([facility]) --    Referral Request  Name of Specialist: Adi Moura MD, [facility]  Provider's specialty: Ophthalmology    Medical condition for referral:  Eye Check up for Diabetes    Is this a NEW request?: yes      Referral ordered by: Jessika Mathis MD      Insurance type: [employer] HMO      No billing information found for this encounter.      Preferred Delivery Method   Input in Epic Call patient once completed, okay to leave a detailed message, patient also provided phone number for eye institute [phone]    Caller Information         Type Contact Phone/Fax    02/09/2024 11:54 AM CST Phone (Incoming) Angela Choi (Self) [phone] (M)            Alternative phone number: None    Can a detailed message be left? Yes